# Patient Record
Sex: FEMALE | Race: WHITE | NOT HISPANIC OR LATINO | Employment: OTHER | URBAN - METROPOLITAN AREA
[De-identification: names, ages, dates, MRNs, and addresses within clinical notes are randomized per-mention and may not be internally consistent; named-entity substitution may affect disease eponyms.]

---

## 2017-10-22 ENCOUNTER — APPOINTMENT (EMERGENCY)
Dept: RADIOLOGY | Facility: HOSPITAL | Age: 82
DRG: 292 | End: 2017-10-22
Payer: COMMERCIAL

## 2017-10-22 ENCOUNTER — APPOINTMENT (EMERGENCY)
Dept: CT IMAGING | Facility: HOSPITAL | Age: 82
DRG: 292 | End: 2017-10-22
Payer: COMMERCIAL

## 2017-10-22 ENCOUNTER — HOSPITAL ENCOUNTER (INPATIENT)
Facility: HOSPITAL | Age: 82
LOS: 9 days | Discharge: NURSING, REHAB OR LONG-TERM CARE | DRG: 292 | End: 2017-11-01
Attending: EMERGENCY MEDICINE | Admitting: INTERNAL MEDICINE
Payer: COMMERCIAL

## 2017-10-22 DIAGNOSIS — S80.02XD TRAUMATIC HEMATOMA OF KNEE, LEFT, SUBSEQUENT ENCOUNTER: ICD-10-CM

## 2017-10-22 DIAGNOSIS — J98.11 ATELECTASIS: ICD-10-CM

## 2017-10-22 DIAGNOSIS — R06.00 DYSPNEA: Primary | ICD-10-CM

## 2017-10-22 DIAGNOSIS — S80.00XA KNEE CONTUSION: ICD-10-CM

## 2017-10-22 DIAGNOSIS — I50.9 CHF (CONGESTIVE HEART FAILURE) (HCC): ICD-10-CM

## 2017-10-22 LAB
ALBUMIN SERPL BCP-MCNC: 3.6 G/DL (ref 3.5–5)
ALP SERPL-CCNC: 77 U/L (ref 46–116)
ALT SERPL W P-5'-P-CCNC: 25 U/L (ref 12–78)
ANION GAP SERPL CALCULATED.3IONS-SCNC: 7 MMOL/L (ref 4–13)
APTT PPP: 26 SECONDS (ref 23–35)
AST SERPL W P-5'-P-CCNC: 20 U/L (ref 5–45)
BASOPHILS # BLD AUTO: 0.06 THOUSANDS/ΜL (ref 0–0.1)
BASOPHILS NFR BLD AUTO: 1 % (ref 0–1)
BILIRUB SERPL-MCNC: 0.59 MG/DL (ref 0.2–1)
BUN SERPL-MCNC: 16 MG/DL (ref 5–25)
CALCIUM SERPL-MCNC: 9.2 MG/DL (ref 8.3–10.1)
CHLORIDE SERPL-SCNC: 103 MMOL/L (ref 100–108)
CO2 SERPL-SCNC: 34 MMOL/L (ref 21–32)
CREAT SERPL-MCNC: 0.85 MG/DL (ref 0.6–1.3)
EOSINOPHIL # BLD AUTO: 0.32 THOUSAND/ΜL (ref 0–0.61)
EOSINOPHIL NFR BLD AUTO: 5 % (ref 0–6)
ERYTHROCYTE [DISTWIDTH] IN BLOOD BY AUTOMATED COUNT: 14.2 % (ref 11.6–15.1)
GFR SERPL CREATININE-BSD FRML MDRD: 61 ML/MIN/1.73SQ M
GLUCOSE SERPL-MCNC: 103 MG/DL (ref 65–140)
HCT VFR BLD AUTO: 38.7 % (ref 34.8–46.1)
HGB BLD-MCNC: 12.7 G/DL (ref 11.5–15.4)
INR PPP: 1.08 (ref 0.86–1.16)
LYMPHOCYTES # BLD AUTO: 1.64 THOUSANDS/ΜL (ref 0.6–4.47)
LYMPHOCYTES NFR BLD AUTO: 24 % (ref 14–44)
MCH RBC QN AUTO: 30.5 PG (ref 26.8–34.3)
MCHC RBC AUTO-ENTMCNC: 32.8 G/DL (ref 31.4–37.4)
MCV RBC AUTO: 93 FL (ref 82–98)
MONOCYTES # BLD AUTO: 0.68 THOUSAND/ΜL (ref 0.17–1.22)
MONOCYTES NFR BLD AUTO: 10 % (ref 4–12)
NEUTROPHILS # BLD AUTO: 4.04 THOUSANDS/ΜL (ref 1.85–7.62)
NEUTS SEG NFR BLD AUTO: 60 % (ref 43–75)
NRBC BLD AUTO-RTO: 0 /100 WBCS
NT-PROBNP SERPL-MCNC: 1296 PG/ML
PLATELET # BLD AUTO: 231 THOUSANDS/UL (ref 149–390)
PMV BLD AUTO: 10.5 FL (ref 8.9–12.7)
POTASSIUM SERPL-SCNC: 3.8 MMOL/L (ref 3.5–5.3)
PROT SERPL-MCNC: 7 G/DL (ref 6.4–8.2)
PROTHROMBIN TIME: 14 SECONDS (ref 12.1–14.4)
RBC # BLD AUTO: 4.16 MILLION/UL (ref 3.81–5.12)
SODIUM SERPL-SCNC: 144 MMOL/L (ref 136–145)
SPECIMEN SOURCE: NORMAL
TROPONIN I BLD-MCNC: 0.01 NG/ML (ref 0–0.08)
WBC # BLD AUTO: 6.74 THOUSAND/UL (ref 4.31–10.16)

## 2017-10-22 PROCEDURE — 85730 THROMBOPLASTIN TIME PARTIAL: CPT | Performed by: EMERGENCY MEDICINE

## 2017-10-22 PROCEDURE — 83880 ASSAY OF NATRIURETIC PEPTIDE: CPT | Performed by: EMERGENCY MEDICINE

## 2017-10-22 PROCEDURE — 93005 ELECTROCARDIOGRAM TRACING: CPT | Performed by: EMERGENCY MEDICINE

## 2017-10-22 PROCEDURE — 71275 CT ANGIOGRAPHY CHEST: CPT

## 2017-10-22 PROCEDURE — 84484 ASSAY OF TROPONIN QUANT: CPT

## 2017-10-22 PROCEDURE — 36415 COLL VENOUS BLD VENIPUNCTURE: CPT | Performed by: EMERGENCY MEDICINE

## 2017-10-22 PROCEDURE — 85025 COMPLETE CBC W/AUTO DIFF WBC: CPT | Performed by: EMERGENCY MEDICINE

## 2017-10-22 PROCEDURE — 85610 PROTHROMBIN TIME: CPT | Performed by: EMERGENCY MEDICINE

## 2017-10-22 PROCEDURE — 80053 COMPREHEN METABOLIC PANEL: CPT | Performed by: EMERGENCY MEDICINE

## 2017-10-22 PROCEDURE — 71020 HB CHEST X-RAY 2VW FRONTAL&LATL: CPT

## 2017-10-22 RX ORDER — FUROSEMIDE 40 MG/1
40 TABLET ORAL 2 TIMES DAILY
COMMUNITY
End: 2018-01-07

## 2017-10-22 RX ORDER — ASPIRIN 81 MG/1
81 TABLET ORAL DAILY
COMMUNITY
End: 2018-01-07

## 2017-10-22 RX ORDER — AMOXICILLIN 250 MG
1 CAPSULE ORAL DAILY
COMMUNITY

## 2017-10-22 RX ORDER — PHENOL 1.4 %
600 AEROSOL, SPRAY (ML) MUCOUS MEMBRANE 2 TIMES DAILY WITH MEALS
Status: ON HOLD | COMMUNITY
End: 2020-11-07 | Stop reason: CLARIF

## 2017-10-22 RX ORDER — CHLORAL HYDRATE 500 MG
2000 CAPSULE ORAL DAILY
Status: ON HOLD | COMMUNITY
End: 2020-11-07 | Stop reason: CLARIF

## 2017-10-22 RX ORDER — ALENDRONATE SODIUM 70 MG/1
70 TABLET ORAL
Status: ON HOLD | COMMUNITY
End: 2020-11-07 | Stop reason: CLARIF

## 2017-10-22 RX ORDER — ATORVASTATIN CALCIUM 20 MG/1
20 TABLET, FILM COATED ORAL DAILY
Status: ON HOLD | COMMUNITY
End: 2020-11-07 | Stop reason: CLARIF

## 2017-10-22 RX ORDER — MELATONIN
5000 DAILY
Status: ON HOLD | COMMUNITY
End: 2020-11-07 | Stop reason: CLARIF

## 2017-10-23 ENCOUNTER — APPOINTMENT (OUTPATIENT)
Dept: RADIOLOGY | Facility: HOSPITAL | Age: 82
DRG: 292 | End: 2017-10-23
Payer: COMMERCIAL

## 2017-10-23 PROBLEM — E78.5 HYPERLIPIDEMIA: Status: ACTIVE | Noted: 2017-10-23

## 2017-10-23 PROBLEM — I50.9 CHF (CONGESTIVE HEART FAILURE) (HCC): Status: ACTIVE | Noted: 2017-10-23

## 2017-10-23 PROBLEM — S80.00XA KNEE CONTUSION: Status: ACTIVE | Noted: 2017-10-23

## 2017-10-23 PROBLEM — I48.91 A-FIB (HCC): Status: ACTIVE | Noted: 2017-10-23

## 2017-10-23 PROBLEM — I25.10 CORONARY ARTERY DISEASE: Status: ACTIVE | Noted: 2017-10-23

## 2017-10-23 PROBLEM — E07.9 DISEASE OF THYROID GLAND: Status: ACTIVE | Noted: 2017-10-23

## 2017-10-23 LAB
ATRIAL RATE: 187 BPM
BACTERIA UR QL AUTO: ABNORMAL /HPF
BILIRUB UR QL STRIP: NEGATIVE
CLARITY UR: CLEAR
COLOR UR: YELLOW
ERYTHROCYTE [DISTWIDTH] IN BLOOD BY AUTOMATED COUNT: 14.1 % (ref 11.6–15.1)
GLUCOSE UR STRIP-MCNC: NEGATIVE MG/DL
HCT VFR BLD AUTO: 37.3 % (ref 34.8–46.1)
HGB BLD-MCNC: 12.1 G/DL (ref 11.5–15.4)
HGB UR QL STRIP.AUTO: NEGATIVE
KETONES UR STRIP-MCNC: NEGATIVE MG/DL
LEUKOCYTE ESTERASE UR QL STRIP: NEGATIVE
MCH RBC QN AUTO: 30.1 PG (ref 26.8–34.3)
MCHC RBC AUTO-ENTMCNC: 32.4 G/DL (ref 31.4–37.4)
MCV RBC AUTO: 93 FL (ref 82–98)
NITRITE UR QL STRIP: POSITIVE
NON-SQ EPI CELLS URNS QL MICRO: ABNORMAL /HPF
P AXIS: -8 DEGREES
PH UR STRIP.AUTO: 5 [PH] (ref 4.5–8)
PLATELET # BLD AUTO: 221 THOUSANDS/UL (ref 149–390)
PMV BLD AUTO: 10.9 FL (ref 8.9–12.7)
PR INTERVAL: 264 MS
PROT UR STRIP-MCNC: NEGATIVE MG/DL
QRS AXIS: 127 DEGREES
QRSD INTERVAL: 136 MS
QT INTERVAL: 462 MS
QTC INTERVAL: 468 MS
RBC # BLD AUTO: 4.02 MILLION/UL (ref 3.81–5.12)
RBC #/AREA URNS AUTO: ABNORMAL /HPF
SP GR UR STRIP.AUTO: 1.01 (ref 1–1.03)
T WAVE AXIS: -22 DEGREES
TROPONIN I SERPL-MCNC: <0.02 NG/ML
UROBILINOGEN UR QL STRIP.AUTO: 0.2 E.U./DL
VENTRICULAR RATE: 62 BPM
WBC # BLD AUTO: 6.43 THOUSAND/UL (ref 4.31–10.16)
WBC #/AREA URNS AUTO: ABNORMAL /HPF

## 2017-10-23 PROCEDURE — 99285 EMERGENCY DEPT VISIT HI MDM: CPT

## 2017-10-23 PROCEDURE — 97163 PT EVAL HIGH COMPLEX 45 MIN: CPT

## 2017-10-23 PROCEDURE — G8987 SELF CARE CURRENT STATUS: HCPCS

## 2017-10-23 PROCEDURE — 81001 URINALYSIS AUTO W/SCOPE: CPT | Performed by: PHYSICIAN ASSISTANT

## 2017-10-23 PROCEDURE — G8979 MOBILITY GOAL STATUS: HCPCS

## 2017-10-23 PROCEDURE — 85027 COMPLETE CBC AUTOMATED: CPT | Performed by: PHYSICIAN ASSISTANT

## 2017-10-23 PROCEDURE — 84484 ASSAY OF TROPONIN QUANT: CPT | Performed by: PHYSICIAN ASSISTANT

## 2017-10-23 PROCEDURE — 73560 X-RAY EXAM OF KNEE 1 OR 2: CPT

## 2017-10-23 PROCEDURE — 97166 OT EVAL MOD COMPLEX 45 MIN: CPT

## 2017-10-23 PROCEDURE — G8988 SELF CARE GOAL STATUS: HCPCS

## 2017-10-23 PROCEDURE — G8978 MOBILITY CURRENT STATUS: HCPCS

## 2017-10-23 RX ORDER — FUROSEMIDE 10 MG/ML
40 INJECTION INTRAMUSCULAR; INTRAVENOUS
Status: DISCONTINUED | OUTPATIENT
Start: 2017-10-23 | End: 2017-10-24

## 2017-10-23 RX ORDER — MELATONIN
5000 DAILY
Status: DISCONTINUED | OUTPATIENT
Start: 2017-10-23 | End: 2017-11-01 | Stop reason: HOSPADM

## 2017-10-23 RX ORDER — MAGNESIUM HYDROXIDE/ALUMINUM HYDROXICE/SIMETHICONE 120; 1200; 1200 MG/30ML; MG/30ML; MG/30ML
30 SUSPENSION ORAL EVERY 6 HOURS PRN
Status: DISCONTINUED | OUTPATIENT
Start: 2017-10-23 | End: 2017-11-01 | Stop reason: HOSPADM

## 2017-10-23 RX ORDER — ONDANSETRON 2 MG/ML
4 INJECTION INTRAMUSCULAR; INTRAVENOUS EVERY 6 HOURS PRN
Status: DISCONTINUED | OUTPATIENT
Start: 2017-10-23 | End: 2017-11-01 | Stop reason: HOSPADM

## 2017-10-23 RX ORDER — POLYETHYLENE GLYCOL 3350 17 G/17G
17 POWDER, FOR SOLUTION ORAL DAILY
Status: DISCONTINUED | OUTPATIENT
Start: 2017-10-23 | End: 2017-10-27

## 2017-10-23 RX ORDER — ASPIRIN 81 MG/1
81 TABLET ORAL DAILY
Status: DISCONTINUED | OUTPATIENT
Start: 2017-10-23 | End: 2017-10-23

## 2017-10-23 RX ORDER — AMOXICILLIN 250 MG
1 CAPSULE ORAL DAILY
Status: DISCONTINUED | OUTPATIENT
Start: 2017-10-23 | End: 2017-11-01 | Stop reason: HOSPADM

## 2017-10-23 RX ORDER — CHLORAL HYDRATE 500 MG
2000 CAPSULE ORAL DAILY
Status: DISCONTINUED | OUTPATIENT
Start: 2017-10-23 | End: 2017-10-23

## 2017-10-23 RX ORDER — POTASSIUM CHLORIDE 750 MG/1
10 TABLET, EXTENDED RELEASE ORAL 2 TIMES DAILY
Status: DISCONTINUED | OUTPATIENT
Start: 2017-10-23 | End: 2017-10-24

## 2017-10-23 RX ORDER — ATORVASTATIN CALCIUM 20 MG/1
20 TABLET, FILM COATED ORAL DAILY
Status: DISCONTINUED | OUTPATIENT
Start: 2017-10-23 | End: 2017-11-01 | Stop reason: HOSPADM

## 2017-10-23 RX ADMIN — POLYETHYLENE GLYCOL 3350 17 G: 17 POWDER, FOR SOLUTION ORAL at 12:50

## 2017-10-23 RX ADMIN — POTASSIUM CHLORIDE 10 MEQ: 750 TABLET, EXTENDED RELEASE ORAL at 11:35

## 2017-10-23 RX ADMIN — ASPIRIN 81 MG: 81 TABLET, COATED ORAL at 09:51

## 2017-10-23 RX ADMIN — CALCIUM CARBONATE 625 MG: 1250 SUSPENSION ORAL at 17:53

## 2017-10-23 RX ADMIN — CALCIUM CARBONATE 625 MG: 1250 SUSPENSION ORAL at 09:58

## 2017-10-23 RX ADMIN — ATORVASTATIN CALCIUM 20 MG: 20 TABLET, FILM COATED ORAL at 09:51

## 2017-10-23 RX ADMIN — Medication 1 TABLET: at 09:51

## 2017-10-23 RX ADMIN — FUROSEMIDE 40 MG: 10 INJECTION, SOLUTION INTRAMUSCULAR; INTRAVENOUS at 09:48

## 2017-10-23 RX ADMIN — POTASSIUM CHLORIDE 10 MEQ: 750 TABLET, EXTENDED RELEASE ORAL at 17:53

## 2017-10-23 RX ADMIN — FUROSEMIDE 40 MG: 10 INJECTION, SOLUTION INTRAMUSCULAR; INTRAVENOUS at 17:52

## 2017-10-23 RX ADMIN — APIXABAN 5 MG: 5 TABLET, FILM COATED ORAL at 09:51

## 2017-10-23 RX ADMIN — Medication 2000 MG: at 09:51

## 2017-10-23 RX ADMIN — APIXABAN 5 MG: 5 TABLET, FILM COATED ORAL at 17:53

## 2017-10-23 RX ADMIN — IOHEXOL 85 ML: 350 INJECTION, SOLUTION INTRAVENOUS at 00:28

## 2017-10-23 RX ADMIN — VITAMIN D, TAB 1000IU (100/BT) 5000 UNITS: 25 TAB at 09:48

## 2017-10-23 NOTE — ED PROVIDER NOTES
History  Chief Complaint   Patient presents with    Shortness of Breath     pt c/o increased SOB and anxiety when laying flat  Hx of CHF  Swelling noted in bilateral legs  C/o LE edema for 1 & 1/2 weeks  Tonight she has been sob,   No cp, +mild cough, no fevers  No copd or asthma            Prior to Admission Medications   Prescriptions Last Dose Informant Patient Reported? Taking? Mirabegron ER (MYRBETRIQ) 50 MG TB24   Yes Yes   Sig: Take 50 mg by mouth daily   Omega-3 Fatty Acids (FISH OIL) 1,000 mg   Yes Yes   Sig: Take 2,000 mg by mouth daily   Polyethylene Glycol 3350 (PEG 3350 PO)   Yes Yes   Sig: Take 17 g by mouth daily   alendronate (FOSAMAX) 70 mg tablet   Yes Yes   Sig: Take 70 mg by mouth every 7 days   apixaban (ELIQUIS) 5 mg   Yes Yes   Sig: Take 5 mg by mouth 2 (two) times a day   aspirin (ECOTRIN LOW STRENGTH) 81 mg EC tablet   Yes Yes   Sig: Take 81 mg by mouth daily   atorvastatin (LIPITOR) 20 mg tablet   Yes Yes   Sig: Take 20 mg by mouth daily   calcium carbonate (OS-DAVE) 600 MG tablet   Yes Yes   Sig: Take 600 mg by mouth 2 (two) times a day with meals   cholecalciferol (VITAMIN D3) 1,000 units tablet   Yes Yes   Sig: Take 5,000 Units by mouth daily   furosemide (LASIX) 40 mg tablet   Yes Yes   Sig: Take 40 mg by mouth 2 (two) times a day   senna-docusate sodium (SENOKOT S) 8 6-50 mg per tablet   Yes Yes   Sig: Take 1 tablet by mouth daily      Facility-Administered Medications: None       Past Medical History:   Diagnosis Date    CHF (congestive heart failure) (HCC)     Coronary artery disease     Disease of thyroid gland     Hyperlipidemia        History reviewed  No pertinent surgical history  History reviewed  No pertinent family history  I have reviewed and agree with the history as documented      Social History   Substance Use Topics    Smoking status: Never Smoker    Smokeless tobacco: Not on file    Alcohol use No        Review of Systems   Constitutional: Negative for appetite change, fatigue and fever  HENT: Negative for rhinorrhea and sore throat  Respiratory: Positive for shortness of breath  Negative for cough and wheezing  Cardiovascular: Negative for chest pain and leg swelling  Gastrointestinal: Negative for abdominal pain, diarrhea and vomiting  Genitourinary: Negative for dysuria and flank pain  Musculoskeletal: Negative for back pain and neck pain  Skin: Negative for rash  Neurological: Negative for syncope and headaches  Psychiatric/Behavioral:        Mood normal       Physical Exam  ED Triage Vitals   Temperature Pulse Respirations Blood Pressure SpO2   10/22/17 2321 10/22/17 2208 10/22/17 2208 10/22/17 2208 10/22/17 2208   98 7 °F (37 1 °C) 68 20 142/68 98 %      Temp Source Heart Rate Source Patient Position - Orthostatic VS BP Location FiO2 (%)   10/22/17 2321 10/22/17 2208 10/22/17 2208 10/22/17 2208 --   Oral Monitor Lying Right arm       Pain Score       --                  Physical Exam   Constitutional: She is oriented to person, place, and time  She appears well-developed and well-nourished  HENT:   Head: Normocephalic and atraumatic  Neck: Normal range of motion  Neck supple  Cardiovascular: Normal rate and regular rhythm  Pulmonary/Chest: Effort normal  No respiratory distress  She has no wheezes  No airway compromise   Abdominal: Soft  There is no tenderness  Musculoskeletal: Normal range of motion  Neurological: She is alert and oriented to person, place, and time  Skin: Skin is warm and dry  Nursing note and vitals reviewed        ED Medications  Medications   iohexol (OMNIPAQUE) 350 MG/ML injection (MULTI-DOSE) 85 mL (85 mL Intravenous Given 10/23/17 0028)       Diagnostic Studies  Labs Reviewed   COMPREHENSIVE METABOLIC PANEL - Abnormal        Result Value Ref Range Status    CO2 34 (*) 21 - 32 mmol/L Final    Sodium 144  136 - 145 mmol/L Final    Potassium 3 8  3 5 - 5 3 mmol/L Final    Chloride 103  100 - 108 mmol/L Final    Anion Gap 7  4 - 13 mmol/L Final    BUN 16  5 - 25 mg/dL Final    Creatinine 0 85  0 60 - 1 30 mg/dL Final    Comment: Standardized to IDMS reference method    Glucose 103  65 - 140 mg/dL Final    Comment:   If the patient is fasting, the ADA then defines impaired fasting glucose as > 100 mg/dL and diabetes as > or equal to 123 mg/dL  Specimen collection should occur prior to Sulfasalazine administration due to the potential for falsely depressed results  Specimen collection should occur prior to Sulfapyridine administration due to the potential for falsely elevated results  Calcium 9 2  8 3 - 10 1 mg/dL Final    AST 20  5 - 45 U/L Final    Comment:   Specimen collection should occur prior to Sulfasalazine administration due to the potential for falsely depressed results  ALT 25  12 - 78 U/L Final    Comment:   Specimen collection should occur prior to Sulfasalazine administration due to the potential for falsely depressed results  Alkaline Phosphatase 77  46 - 116 U/L Final    Total Protein 7 0  6 4 - 8 2 g/dL Final    Albumin 3 6  3 5 - 5 0 g/dL Final    Total Bilirubin 0 59  0 20 - 1 00 mg/dL Final    eGFR 61  ml/min/1 73sq m Final    Narrative:     National Kidney Disease Education Program recommendations are as follows:  GFR calculation is accurate only with a steady state creatinine  Chronic Kidney disease less than 60 ml/min/1 73 sq  meters  Kidney failure less than 15 ml/min/1 73 sq  meters     NT-BNP PRO (BRAIN NATRIURETIC PEPTIDE) - Abnormal     NT-proBNP 1,296 (*) <450 pg/mL Final   CBC AND DIFFERENTIAL - Normal    WBC 6 74  4 31 - 10 16 Thousand/uL Final    RBC 4 16  3 81 - 5 12 Million/uL Final    Hemoglobin 12 7  11 5 - 15 4 g/dL Final    Hematocrit 38 7  34 8 - 46 1 % Final    MCV 93  82 - 98 fL Final    MCH 30 5  26 8 - 34 3 pg Final    MCHC 32 8  31 4 - 37 4 g/dL Final    RDW 14 2  11 6 - 15 1 % Final    MPV 10 5  8 9 - 12 7 fL Final    Platelets 810  087 - 390 Thousands/uL Final    nRBC 0  /100 WBCs Final    Neutrophils Relative 60  43 - 75 % Final    Lymphocytes Relative 24  14 - 44 % Final    Monocytes Relative 10  4 - 12 % Final    Eosinophils Relative 5  0 - 6 % Final    Basophils Relative 1  0 - 1 % Final    Neutrophils Absolute 4 04  1 85 - 7 62 Thousands/µL Final    Lymphocytes Absolute 1 64  0 60 - 4 47 Thousands/µL Final    Monocytes Absolute 0 68  0 17 - 1 22 Thousand/µL Final    Eosinophils Absolute 0 32  0 00 - 0 61 Thousand/µL Final    Basophils Absolute 0 06  0 00 - 0 10 Thousands/µL Final   PROTIME-INR - Normal    Protime 14 0  12 1 - 14 4 seconds Final    INR 1 08  0 86 - 1 16 Final   APTT - Normal    PTT 26  23 - 35 seconds Final    Narrative: Therapeutic Heparin Range = 60-90 seconds   POCT TROPONIN - Normal    POC Troponin I 0 01  0 00 - 0 08 ng/ml Final    Specimen Type VENOUS   Final    Narrative:     [employer] i-Stat handheld analyzer 99% cutoff is > 0 08ng/mL in [facility] Emergency Departments    o cTnI 99% cutoff is useful only when applied to patients in the clinical setting of myocardial ischemia  o cTnI 99% cutoff should be interpreted in the context of clinical history, ECG findings and possibly cardiac imaging to establish correct diagnosis  o cTnI 99% cutoff may be suggestive but clearly not indicative of a coronary event without the clinical setting of myocardial ischemia  X-ray chest 2 views    (Results Pending)   CTA ED chest PE study    (Results Pending)       Procedures  Procedures      Phone Contacts  ED Phone Contact    ED Course  ED Course                                MDM  Number of Diagnoses or Management Options  Atelectasis:   Dyspnea:      Amount and/or Complexity of Data Reviewed  Clinical lab tests: ordered and reviewed  Tests in the radiology section of CPT®: ordered and reviewed    Risk of Complications, Morbidity, and/or Mortality  Presenting problems: moderate  General comments: Pt   Admitted to [facility] for further work up      CritCare Time    Disposition  Final diagnoses:   Dyspnea   Atelectasis     ED Disposition     ED Disposition Condition Comment    Admit  Case was discussed with GRAYSON and the patient's admission status was agreed to be observation/tele      Follow-up Information    None       Patient's Medications   Discharge Prescriptions    No medications on file     No discharge procedures on file      ED Provider  Electronically Signed by       Elijah Desai MD  10/23/17 7704

## 2017-10-23 NOTE — H&P
History and Physical - [facility] Internal Medicine    Patient Information: Jacques An 80 y o  female MRN: [id number]  Unit/Bed#: WALTER Encounter: [id number]  Admitting Physician: Des Kang PA-C  PCP: Ke Garcia  Date of Admission:  10/23/17    Assessment/Plan:    Hospital Problem List:     Principal Problem:    CHF (congestive heart failure) ([facility] [address] )  Active Problems:    Hyperlipidemia    Coronary artery disease    A-fib ([facility] [address] )    Knee contusion      Plan for the Primary Problem(s):  · Acute on chronic CHF  · Admit to med/surg on telemetry  Recent admit to [facility] earlier this month for same  Had ECHO on 10/10 that showed EF of 60%  Will not order repeat ECHO at this time  Patient presents to [facility] for admission this time at family request  Order IV lasix  Trend troponin  Consult cardiology  Plan for Additional Problems:   · Hyperlipidemia- continue lipitor  · CAD- continue telemetry and trend troponin  Denies chest pain at this time  · afib- on eliquis  · Left knee contusion with hx total knee replacement- will order xray and ortho consult due to severity of bruising  VTE Prophylaxis: Apixaban (Eliquis)  / reason for no mechanical VTE prophylaxis swelling   Code Status: DNR/DNI  POLST: There is no POLST form on file for this patient (pre-hospital)    Anticipated Length of Stay:  Patient will be admitted on an Observation basis with an anticipated length of stay of  Less than 2 midnights  Justification for Hospital Stay: patient requires IV lasix and troponin    Total Time for Visit, including Counseling / Coordination of Care: 45 minutes  Greater than 50% of this total time spent on direct patient counseling and coordination of care  Chief Complaint:   Shortness of breath    History of Present Illness:    Jacques An is a 80 y o  female who presents with shortness of breath  Patient has memory loss and is a poor historian  She resides at [facility]  Her son is at bedside   She was recently admitted to [facility] for acute on chronic CHF  She had an ECHO on 10/10 that showed EF of 60%  She was [facility] back to [facility]F on lasix 40mg daily  A recent PCP office note shows that her lasix dose was increased to 60mg BID due to continued shortness of breath and leg edema  She denies chest pain  Of note she also fell a week and a half ago  She has a history of knee replacements but is unsure who did them  She has significant swelling of the left knee and left posterior thigh  It appears that an xray was done after the fall but she was not seen by ortho  She presents to this hospital for admission at patient's family request      Review of Systems:    Review of Systems   Constitutional: Negative  HENT: Negative  Eyes: Negative  Respiratory: Positive for cough and shortness of breath  Cardiovascular: Positive for leg swelling  Gastrointestinal: Negative  Endocrine: Negative  Genitourinary: Negative  Musculoskeletal: Positive for gait problem and joint swelling  Skin: Positive for color change  Allergic/Immunologic: Negative  Hematological: Bruises/bleeds easily  Psychiatric/Behavioral: Negative  Past Medical and Surgical History:     Past Medical History:   Diagnosis Date    CHF (congestive heart failure) ([facility] [address] )     Coronary artery disease     Disease of thyroid gland     Hyperlipidemia        History reviewed  No pertinent surgical history  Meds/Allergies:    Prior to Admission medications    Medication Sig Start Date End Date Taking?  Authorizing Provider   alendronate (FOSAMAX) 70 mg tablet Take 70 mg by mouth every 7 days   Yes Historical Provider, MD   apixaban (ELIQUIS) 5 mg Take 5 mg by mouth 2 (two) times a day   Yes Historical Provider, MD   aspirin (ECOTRIN LOW STRENGTH) 81 mg EC tablet Take 81 mg by mouth daily   Yes Historical Provider, MD   atorvastatin (LIPITOR) 20 mg tablet Take 20 mg by mouth daily   Yes Historical Provider, MD   calcium carbonate (OS-DAVE) 600 MG tablet Take 600 mg by mouth 2 (two) times a day with meals   Yes Historical Provider, MD   cholecalciferol (VITAMIN D3) 1,000 units tablet Take 5,000 Units by mouth daily   Yes Historical Provider, MD   furosemide (LASIX) 40 mg tablet Take 40 mg by mouth 2 (two) times a day   Yes Historical Provider, MD   Mirabegron ER (MYRBETRIQ) 50 MG TB24 Take 50 mg by mouth daily   Yes Historical Provider, MD   Omega-3 Fatty Acids (FISH OIL) 1,000 mg Take 2,000 mg by mouth daily   Yes Historical Provider, MD   Polyethylene Glycol 3350 (PEG 3350 PO) Take 17 g by mouth daily   Yes Historical Provider, MD   senna-docusate sodium (SENOKOT S) 8 6-50 mg per tablet Take 1 tablet by mouth daily   Yes Historical Provider, MD     I have reveiwed home medications using records provided by [facility]  Allergies: No Known Allergies    Social History:     Marital Status:    Occupation: retired  Patient Pre-hospital Living Situation: lives at [facility]  Patient Pre-hospital Level of Mobility: recent falls  Uses walker  Patient Pre-hospital Diet Restrictions: none  Substance Use History:   History   Alcohol Use No     History   Smoking Status    Never Smoker   Smokeless Tobacco    Not on file     History   Drug Use No       Family History:    non-contributory    Physical Exam:     Vitals:   Blood Pressure: 149/65 (10/23/17 0144)  Pulse: 70 (10/23/17 0144)  Temperature: 98 7 °F (37 1 °C) (10/22/17 2321)  Temp Source: Oral (10/22/17 2321)  Respirations: 21 (10/23/17 0144)  Weight - Scale: 98 kg (216 lb 0 8 oz) (10/22/17 2208)  SpO2: 98 % (10/23/17 0144)    Physical Exam   Constitutional: She appears well-developed and well-nourished  No distress  HENT:   Head: Normocephalic and atraumatic  Eyes: Conjunctivae are normal  Pupils are equal, round, and reactive to light  Neck: Normal range of motion  Neck supple  No JVD present  No tracheal deviation present  No thyromegaly present     Cardiovascular: Normal rate and regular rhythm  Pulmonary/Chest: Effort normal    Decreased breath sounds in bases bilaterally  No wheezes   Abdominal: Soft  Bowel sounds are normal  She exhibits no distension and no mass  There is no tenderness  There is no rebound and no guarding  Musculoskeletal:   Left knee with significant ecchymosis anteriorly and extending posteriorly into thigh and posterior aspect of knee  Bilateral lower extremity pitting edema noted   Lymphadenopathy:     She has no cervical adenopathy  Neurological: She is alert  Pleasantly confused   Skin: Skin is warm and dry  She is not diaphoretic  Ecchymosis noted on left knee and thigh and on bilateral arms   Psychiatric: She has a normal mood and affect  Her behavior is normal    Vitals reviewed  Additional Data:     Lab Results: I have personally reviewed pertinent reports  Results from last 7 days  Lab Units 10/22/17  2212   WBC Thousand/uL 6 74   HEMOGLOBIN g/dL 12 7   HEMATOCRIT % 38 7   PLATELETS Thousands/uL 231   NEUTROS PCT % 60   LYMPHS PCT % 24   MONOS PCT % 10   EOS PCT % 5       Results from last 7 days  Lab Units 10/22/17  2212   SODIUM mmol/L 144   POTASSIUM mmol/L 3 8   CHLORIDE mmol/L 103   CO2 mmol/L 34*   BUN mg/dL 16   CREATININE mg/dL 0 85   CALCIUM mg/dL 9 2   TOTAL PROTEIN g/dL 7 0   BILIRUBIN TOTAL mg/dL 0 59   ALK PHOS U/L 77   ALT U/L 25   AST U/L 20   GLUCOSE RANDOM mg/dL 103       Results from last 7 days  Lab Units 10/22/17  2212   INR  1 08       Imaging: I have personally reviewed pertinent reports  No results found  EKG, Pathology, and Other Studies Reviewed on Admission:   · EKG: no ischemic changes    Allscripts / Epic Records Reviewed: Yes     ** Please Note: This note has been constructed using a voice recognition system   **

## 2017-10-23 NOTE — CONSULTS
Consult - Cardiology   Mckenna Goodman Charlton 80 y o  female MRN: [id number]  Unit/Bed#: E2 -01 Encounter: [id number]          Reason For Consult:  CHF, AFIB, Pacer in situ    History Of Present Illness: The patient is an 72-year-old white female with a history of dementia  She resides at [facility]  She has known atrial fibrillation with a permanent pacemaker in situ  She has known diastolic congestive heart failure, hypertension with LVH and hyperlipidemia  [facility]  She was recently hospitalized at [facility] from 10/09/17-10/11/17 for heart failure  Her family physician saw her 4 days ago and adjusted her diuretics upwards since the family had noted that her swelling was worse in the week following discharge  He increased her furosemide to 60 mg b i d     The patient also had increasing dyspnea  The patient was admitted early today with ongoing dyspnea and edema  Apparently she also had fallen  She is a very poor historian  She does appear to be dyspnea  She denies maegan chest pain, palpitations or lightheadedness  Past Medical History:   Chronic diastolic congestive heart failure  Sick sinus syndrome with both atrial fibrillation and pacemaker in situ  Hypertension with mild to moderate left ventricular hypertrophy on recent echocardiogram  Hyperlipidemia-on statin  History of UTI  Dementia  Vitamin-D deficiency  Osteoporosis  Hard of hearing  Apparent nonobstructive CAD on cardiac catheterization in 2010  History of bilateral knee replacement  Nonocclusive carotid stenosis  Remote type of cancer-unspecified  Mild aortic stenosis on recent echo    Allergy:  No Known Allergies    Medications:  Five as a max 70 mg weekly  Eliquis 5 mg b i d  Aspirin 81 mg daily  Atorvastatin 20 mg daily  Calcium carbonate 600 mg b i d    Vitamin D3 5000 units daily  Fish oil 2 g daily  Myrbetriq  MiraLax  Senokot  Furosemide 60 mg b i d -just increased last Thursday  Sodium phosphates p r n  constipation    Family History:  Arthritis, cancer    Social History:  Apparent alcohol excess in the past  Never smoked        ROS:  No TIAs or claudication    Exam:  Blood pressure 133/64 pulse is 62 and somewhat irregular  General:  Obese pleasant elderly white female with perhaps mild dyspnea at rest,   Head: Normocephalic, atraumatic  Eyes:   No Icterus  Normal Conjunctiva  Oropharynx: normal-appearing mucosa and no pharyngitis, no exudate  Neck: thyroid: not enlarged, symmetric, no tenderness/mass/nodules, no carotid bruit and no JVD   Heart: irregularly irregular rhythm,, grade 2 systolic murmur at the base  No diastolic murmur rub or gallop  Lungs:  Decreased breath sounds at the bases bilaterally  No wheezing, rhonchi or Rales  Abdomen: obese, normal findings: no masses palpable, no organomegaly and soft, non-tender  Lower Limbs: 2-3+ edema with absent ft pulses    ECG: AFIB  Left sided IVCD  Nonspecific ST T wave abnls  Pacer operating in VVI mode  Troponins negative  Potassium 3 8 BUN 16, creatinine 0 85  Hemoglobin 12 7, white blood count 6 74, platelets [id number]  ProBNP 1296  Chest x-ray shows focal opacity in right mid to lower lung field    ASSESSMENT AND PLAN:   1  Acute on chronic diastolic congestive heart failure  Agree with intravenous Lasix although may have to increase this dosage  Follow renal function closely- note the family physician has noted that the patient's Tami Irina is probably closer to 199 lb (in the office in the past) and she is about 15 lbs fluid overload  2  Sick sinus syndrome with pacemaker in situ and established atrial fibrillation  Patient is on Eliquis which may not be advisable going forward in light of falls  At this time will stop aspirin since no strong indication for this  Also would stop fish oil-note rate is controlled without AV renan blocking agents  3  Mild aortic stenosis  4    Apparent history of hypertension with LVH although blood pressure well controlled without specific medications  5  Hyperlipidemia-on statin  Will follow with you and advise further        Tatum Novak MD

## 2017-10-23 NOTE — PLAN OF CARE
Seen on morning rounds after early morning admission with recent fall and significant ecchymosis to a in area of total knee arthroplasty with large area of ecchymosis and questionable hematoma over the left patellar region and ecchymosis below and above area of total knee  Areas taut patient insistent on wanting to weight bear patient has underlying dementia  She is dyspneic at rest unclear what her baseline is recent admission at [facility] in relation to recurrent CHF entrance BNP 1200 chest x-ray with vascular congestion bilaterally CTA of chest pending on a patient with chronic atrial fibrillation on Eliquis  Will await orthopedic input along with Cardiology will continue IV diuresis for now  Presently in rate controlled AFib

## 2017-10-23 NOTE — CASE MANAGEMENT
[address] [facility] in the [facility] by Clinton Flores for 2017  Network Utilization Review Department  Phone: [phone]; Fax [phone]  ATTENTION: The Network Utilization Review Department is now centralized for our 7 Facilities  Make a note that we have a new phone and fax numbers for our Department  Please call with any questions or concerns to [phone] and carefully follow the prompts so that you are directed to the right person  All voicemails are confidential  Fax any determinations, approvals, denials, and requests for initial or continue stay review clinical to [phone]  Due to HIGH CALL volume, it would be easier if you could please send faxed requests to expedite your requests and in part, help us provide discharge notifications faster  Initial Clinical Review    Admission: Date/Time/Statement:      IP Order  10/23  1040  Admitting Physician SADE Phillip    Level of Care Med Surg    Estimated length of stay More than 2 Midnights    Certification I certify that inpatient services are medically necessary for this patient for a duration of greater than two midnights  See H&P and MD Progress Notes for additional information about the patient's course of treatment      OBS order  10/23   0119    Orders Placed This Encounter   Procedures    Place in Observation (expected length of stay for this patient is less than two midnights)     Standing Status:   Standing     Number of Occurrences:   1     Order Specific Question:   Admitting Physician     Answer:   Ruth Marquez [[id number]]     Order Specific Question:   Level of Care     Answer:   Med Surg [16]         ED: Date/Time/Mode of Arrival:   ED Arrival Information     Expected Arrival Acuity Means of Arrival Escorted By Service Admission Type    - 10/22/2017 22:00 Urgent Ambulance [facility] General Medicine Urgent    Arrival Complaint    Shortness Of Breath          Chief Complaint: Chief Complaint   Patient presents with    Shortness of Breath     pt c/o increased SOB and anxiety when laying flat  Hx of CHF  Swelling noted in bilateral legs  History of Illness:    Drew Avila is a 80 y o  female who presents with shortness of breath  Patient has memory loss and is a poor historian  She resides at [facility]  Her son is at bedside  She was recently admitted to [facility] for acute on chronic CHF  She had an ECHO on 10/10 that showed EF of 60%  She was sent back to SNF on lasix 40mg daily  A recent PCP office note shows that her lasix dose was increased to 60mg BID due to continued shortness of breath and leg edema  She denies chest pain  Of note she also fell a week and a half ago  She has a history of knee replacements but is unsure who did them  She has significant swelling of the left knee and left posterior thigh  It appears that an xray was done after the fall but she was not seen by ortho   She presents to this hospital for admission at patient's family request         ED Vital Signs:   ED Triage Vitals   Temperature Pulse Respirations Blood Pressure SpO2   10/22/17 2321 10/22/17 2208 10/22/17 2208 10/22/17 2208 10/22/17 2208   98 7 °F (37 1 °C) 68 20 142/68 98 %      Temp Source Heart Rate Source Patient Position - Orthostatic VS BP Location FiO2 (%)   10/22/17 2321 10/22/17 2208 10/22/17 2208 10/22/17 2208 --   Oral Monitor Lying Right arm       Pain Score       10/23/17 0144       No Pain        Wt Readings from Last 1 Encounters:   10/23/17 96 kg (211 lb 9 6 oz)       Vital Signs (abnormal):   10/23/17 0144 -- 70 21 149/65 98 % -- AM   10/23/17 0031 -- 61 22 142/63 99 % -- HMR   10/22/17 2321 98 7 °F (37 1 °C) 66  24 167/70 98 %       Abnormal Labs/Diagnostic Test Results: BNP 1296, Co2 34  CT chest - wnl  EKG- a-fib     ED Treatment:   Medication Administration from 10/22/2017 2200 to 10/23/2017 0202       Date/Time Order Dose Route Action Action by Comments     10/23/2017 0028 iohexol (OMNIPAQUE) 350 MG/ML injection (MULTI-DOSE) 85 mL 85 mL Intravenous Given Maxine Friday           Past Medical/Surgical History: Active Ambulatory Problems     Diagnosis Date Noted    Disease of thyroid gland 10/23/2017     Resolved Ambulatory Problems     Diagnosis Date Noted    No Resolved Ambulatory Problems     Past Medical History:   Diagnosis Date    CHF (congestive heart failure) (HCC)     Coronary artery disease     Disease of thyroid gland     Hyperlipidemia        Admitting Diagnosis: Shortness of breath [R06 02]  Atelectasis [J98 11]  CHF (congestive heart failure) (HCC) [I50 9]  Dyspnea [R06 00]  Knee contusion [S80 00XA]    Age/Sex: 80 y o  female    Assessment/Plan: Hospital Problem List:    Principal Problem:    CHF (congestive heart failure) ([facility] [address] )  Active Problems:    Hyperlipidemia    Coronary artery disease    A-fib (HCC)    Knee contusion   Plan for the Primary Problem(s):  · Acute on chronic CHF  ? Admit to med/surg on telemetry  Recent admit to [facility] earlier this month for same  Had ECHO on 10/10 that showed EF of 60%  Will not order repeat ECHO at this time  Patient presents to [facility] for admission this time at family request  Order IV lasix  Trend troponin  Consult cardiology     Plan for Additional Problems:   · Hyperlipidemia- continue lipitor  · CAD- continue telemetry and trend troponin  Denies chest pain at this time  · afib- on eliquis  · Left knee contusion with hx total knee replacement- will order xray and ortho consult due to severity of bruising     VTE Prophylaxis: Apixaban (Eliquis)  / reason for no mechanical VTE prophylaxis swelling   Code Status: DNR/DNI  POLST: There is no POLST form on file for this patient (pre-hospital)   Anticipated Length of Stay:  Patient will be admitted on an Observation basis with an anticipated length of stay of  Less than 2 midnights     Justification for Hospital Stay: patient requires IV lasix and troponin       Admission Orders:  Scheduled Meds:   apixaban 5 mg Oral BID   aspirin 81 mg Oral Daily   atorvastatin 20 mg Oral Daily   calcium carbonate 625 mg Oral BID With Meals   cholecalciferol 5,000 Units Oral Daily   fish oil 2,000 mg Oral Daily   furosemide 40 mg Intravenous BID (diuretic)   Mirabegron ER 50 mg Oral Daily   polyethylene glycol 17 g Oral Daily   senna-docusate sodium 1 tablet Oral Daily     Continuous Infusions:    PRN Meds:   aluminum-magnesium hydroxide-simethicone    ondansetron    Cardiac diet   Act as galo   EKG prn cp   Daily weight   I&O   L knee xray   10/24 bmp, mg   Serial trop   OT PT eval   Cardiology consult   Ortho consult   O2 keep sat > 92 %   Tele   Orthopedic consult     Ortho consult   10/23   Assessment:  80 y  o female with left knee traumatic bursitis, and general ecchymosis throughout left leg secondary to fall on Eliquis  Status post left total knee replacement    Plan:   · Weight bearing as tolerated  left lower extremity  · PT  · Pain control  · Recommend Ace wrap for compression to left knee and icing regularly  · Continue to monitor the bursa for signs of infection otherwise progress as tolerated  Cardiology consult 10/23  ASSESSMENT AND PLAN:   1  Acute on chronic diastolic congestive heart failure  Agree with intravenous Lasix although may have to increase this dosage  Follow renal function closely- note the family physician has noted that the patient's Chaneta Labrador is probably closer to 199 lb (in the office in the past) and she is about 15 lbs fluid overload  2  Sick sinus syndrome with pacemaker in situ and established atrial fibrillation  Patient is on Eliquis which may not be advisable going forward in light of falls  At this time will stop aspirin since no strong indication for this  Also would stop fish oil-note rate is controlled without AV renan blocking agents  3  Mild aortic stenosis  4    Apparent history of hypertension with [facility] although blood pressure well controlled without specific medications  5    Hyperlipidemia-on statin

## 2017-10-23 NOTE — PLAN OF CARE
Problem: OCCUPATIONAL THERAPY ADULT  Goal: Performs self-care activities at highest level of function for planned discharge setting  See evaluation for individualized goals  Treatment Interventions: ADL retraining, Functional transfer training, Endurance training, Cognitive reorientation, Patient/family training, Equipment evaluation/education, Compensatory technique education, Continued evaluation          See flowsheet documentation for full assessment, interventions and recommendations  Limitation: Decreased ADL status, Decreased UE strength, Decreased Safe judgement during ADL, Decreased cognition, Decreased endurance, Decreased high-level ADLs  Prognosis: Fair  Assessment: Pt is a 87y/o female admitted to the hospital 2* symptoms of SOB  Pt noted with CHF  Pt with recent hospitalization at [address] [place] [address] [address] for CHF  Pt pending L knee x-ray 2* knee contusion from a recent fall  Therapist found pt transferring to [facility] with PCA; assistance provided  Further functional mobility assessment limited pending x-ray/ortho consult  PTA pt states independence with all aspects of her ADLs, transfers, ambulation--with RW; +falls; questionable accuracy of pt's statements 2* hx cognitive decline  Currently pt demonstrated deficits with her functional balance, functional mobility, ADL status, and cognition(i e memory, orientation, problem-solving)        OT Discharge Recommendation: Short Term Rehab

## 2017-10-23 NOTE — PLAN OF CARE
Problem: PHYSICAL THERAPY ADULT  Goal: Performs mobility at highest level of function for planned discharge setting  See evaluation for individualized goals  Treatment/Interventions: Functional transfer training, LE strengthening/ROM, Therapeutic exercise, Endurance training, Patient/family training, Bed mobility, Gait training, Spoke to nursing, Family          See flowsheet documentation for full assessment, interventions and recommendations  Prognosis: Good  Problem List: Decreased strength, Decreased range of motion, Decreased endurance, Impaired balance, Decreased mobility, Decreased cognition, Impaired judgement, Decreased safety awareness, Decreased skin integrity, Obesity, Pain  Assessment: pt is an 89y/o f who presents to [facility] s/p fall at SNF resulting in L knee contusion  imaging (-) for acute fx  ordered WBAT L LE per ortho  PMH significant for hyperlipidemia, CAD, a-fib, + CHF  at baseline, pt mod (I) c mobility c RW  resides at [facility]  oriented to person + time during PT session c re-orientation to place + situation  currently requires min (A)x1 for OOB mobility tasks 2* deficits in strength, balance, gait quality, cognition, pain + activity tolerance noted in PT exam above  Barthel Index 45/100  pt also impulsive attempting to get OOB upon arrival for PT eval  mod verbal cues required for safety awareness c all mobility tasks  ambulated 15'x2 c RW c seated rest for toileting  pt leans on RW c elbows requiring cues for upright posture + hand placement  able to sit OOB in chair c chair alarm activated at end of session  would benefit from skilled PT to maximize functional mobility + return to PLOF  upon d/c, recommend pt return to SNF c PT at next level of care  PT eval of high complexity 2* unstable med status s/p fall resulting in contusion L knee   pt c multiple co-morbidities including impaired cognition impacting PT  presents c mobility deviations above from baseline requiring min (A)x1  Barriers to Discharge: None     Recommendation: Short-term skilled PT, Other (Comment) (return to SNF c PT at next level of care)     PT - OK to Discharge: Yes (to SNF c PT)    See flowsheet documentation for full assessment

## 2017-10-23 NOTE — PHYSICAL THERAPY NOTE
PT Evaluation (26min)  (13:50-14:16)    Past Medical History:   Diagnosis Date    CHF (congestive heart failure) (HCC)     Coronary artery disease     Disease of thyroid gland     Hyperlipidemia         10/23/17 1416   Note Type   Note type Eval only   Pain Assessment   Pain Assessment No/denies pain   Home Living   Type of Home SNF  ([facility])   Home Layout One level   Home Equipment Walker   Prior Function   Level of [place] Needs assistance with ADLs and functional mobility  (ambulates c RW)   Receives Help From Personal care attendant  (SNF staff prn)   ADL Assistance Needs assistance   Falls in the last 6 months (2)   Restrictions/Precautions   Weight Bearing Precautions Per Order Yes   LLE Weight Bearing Per Order WBAT   Other Precautions Cognitive; Chair Alarm; Bed Alarm;O2;Fall Risk;Pain   General   Additional Pertinent History pt presents to [facility] s/p fall at [facility]  imaging (-) acute fx, however presents c patellar ecchymosis  ordered WBAT L LE per ortho  PT consulted for mobility + d/c planning  Family/Caregiver Present Yes  (pt's dtr)   Cognition   Orientation Level Oriented to person;Oriented to time   RUE Assessment   RUE Assessment WFL   RUE Strength   RUE Overall Strength Within Functional Limits - able to perform ADL tasks with strength   LUE Assessment   LUE Assessment WFL   LUE Strength   LUE Overall Strength Within Functional Limits - able to perform ADL tasks with strength   RLE Assessment   RLE Assessment WFL  (4/5)   LLE Assessment   LLE Assessment WFL  (3+/5)   Coordination   Sensation WFL   Bed Mobility   Supine to Sit 5  Supervision   Additional items HOB elevated; Increased time required;Verbal cues; Impulsive   Transfers   Sit to Stand 4  Minimal assistance   Additional items Assist x 1; Armrests; Verbal cues; Increased time required   Stand to Sit 4  Minimal assistance   Additional items Assist x 1; Armrests; Verbal cues  (cueing to remain c in LATISHA of RW)   Toilet transfer 4  Minimal assistance   Additional items Assist x 1;Verbal cues;Standard toilet  (c grab bar)   Ambulation/Elevation   Gait pattern Poor UE support; Antalgic; Forward Flexion;Decreased foot clearance; Excessively slow  (rests elbows on RW)   Gait Assistance 4  Minimal assist   Additional items Assist x 1;Verbal cues   Assistive Device Rolling walker   Distance 15' x2 c seated rest for toileting   Balance   Static Sitting Good   Dynamic Sitting Fair +   Static Standing Fair   Dynamic Standing Fair -   Ambulatory Fair -   Endurance Deficit   Endurance Deficit Yes   Endurance Deficit Description SaO2 89-91% on RA throughout session  stable at 93% on 2 5L O2 sitting in chair  Activity Tolerance   Activity Tolerance Patient limited by fatigue;Patient limited by pain   Nurse [facility]   Assessment   Prognosis Good   Problem List Decreased strength;Decreased range of motion;Decreased endurance; Impaired balance;Decreased mobility; Decreased cognition; Impaired judgement;Decreased safety awareness;Decreased skin integrity;Obesity;Pain   Assessment pt is an 89y/o f who presents to [facility] s/p fall at [facility] resulting in L knee contusion  imaging (-) for acute fx  ordered WBAT L LE per ortho  PMH significant for hyperlipidemia, CAD, a-fib, + CHF  at baseline, pt mod (I) c mobility c RW  resides at [facility]  oriented to person + time during PT session c re-orientation to place + situation  currently requires min (A)x1 for OOB mobility tasks 2* deficits in strength, balance, gait quality, cognition, pain + activity tolerance noted in PT exam above  Barthel Index 45/100  pt also impulsive attempting to get OOB upon arrival for PT eval  mod verbal cues required for safety awareness c all mobility tasks  ambulated 15'x2 c RW c seated rest for toileting  pt leans on RW c elbows requiring cues for upright posture + hand placement  able to sit OOB in chair c chair alarm activated at end of session  would benefit from skilled PT to maximize functional mobility + return to PLOF  upon d/c, recommend pt return to SNF c PT at next level of care  PT eval of high complexity 2* unstable med status s/p fall resulting in contusion L knee  pt c multiple co-morbidities including impaired cognition impacting PT  presents c mobility deviations above from baseline requiring min (A)x1  Barriers to Discharge None   Goals   Patient Goals "to get better"  STG Expiration Date 11/02/17   Short Term Goal #1 1  increase strength 1/2 grade, 2  perform bed mobility mod (I), 3  safely perform transfers c (S), 4  ambulate 150' c (S) c RW s overt loss of balance to safely navigate [facility]   Plan   Treatment/Interventions Functional transfer training;LE strengthening/ROM; Therapeutic exercise; Endurance training;Patient/family training;Bed mobility;Gait training;Spoke to nursing;Family   PT Frequency 5x/wk   Recommendation   Recommendation Short-term skilled PT; Other (Comment)  (return to SNF c PT at next level of care)   PT - OK to Discharge Yes  (to SNF c PT)   Barthel Index   Feeding 10   Bathing 0   Grooming Score 5   Dressing Score 5   Bladder Score 5   Bowels Score 5   Toilet Use Score 5   Transfers (Bed/Chair) Score 10   Mobility (Level Surface) Score 0   Stairs Score 0   Barthel Index Score 45     Michele Lynn, PT

## 2017-10-23 NOTE — CONSULTS
Orthopedics   Vanessa Morrison 80 y o  female MRN: [id number]  Unit/Bed#: E2 -01      Chief Complaint:   left knee pain with ecchymosis and swelling status post fall  HPI:   80 y  o female complaining of left knee pain and ecchymosis status post fall  She has brought to the hospital due to shortness of breath secondary to CHF  The patient is a resident at [facility] and does have some dementia  She does not recall a fall but per history taken last evening with her son by Medicine she had fall about a week and half ago prior to admission to the [facility] for workup of CHF  Those x-rays failed to show fracture the patient was not seen by Ortho  She does have bruising and ecchymosis along the entire posterior aspect of the left leg with significant lower extremity edema  She states she has been walking with her walker and her legs been sore mainly over the anterior aspect of the patella where she has swelling  She denies any redness or fevers  She does have a history of a left total knee replacement which was done at [address] but she does not recall the name of the surgeon  Patient is normally on Eliquis due to history of atrial fibrillation  Review Of Systems:   · Skin: Normal  · Neuro: See HPI  · Musculoskeletal: See HPI  · 14 point review of systems negative except as stated above     Past Medical History:   Past Medical History:   Diagnosis Date    CHF (congestive heart failure) ([address] )     Coronary artery disease     Disease of thyroid gland     Hyperlipidemia        Past Surgical History:   History reviewed  No pertinent surgical history  Family History:  Family history reviewed and non-contributory  History reviewed  No pertinent family history  Social History:  Social History     Social History    Marital status:       Spouse name: N/A    Number of children: N/A    Years of education: N/A     Social History Main Topics    Smoking status: Never Smoker    Smokeless tobacco: None    Alcohol use No    Drug use: No    Sexual activity: Not Asked     Other Topics Concern    None     Social History Narrative    None       Allergies:   No Known Allergies        Labs:    0  Lab Value Date/Time   HCT 37 3 10/23/2017 0627   HCT 38 7 10/22/2017 2212   HGB 12 1 10/23/2017 0627   HGB 12 7 10/22/2017 2212   INR 1 08 10/22/2017 2212   WBC 6 43 10/23/2017 0627   WBC 6 74 10/22/2017 2212       Meds:    Current Facility-Administered Medications:     aluminum-magnesium hydroxide-simethicone (MYLANTA) 200-200-20 mg/5 mL oral suspension 30 mL, 30 mL, Oral, Q6H PRN, Martínez Buchanan PA-C    apixaban (ELIQUIS) tablet 5 mg, 5 mg, Oral, BID, Martínez Single, PA-C, 5 mg at 10/23/17 0951    aspirin (ECOTRIN LOW STRENGTH) EC tablet 81 mg, 81 mg, Oral, Daily, Martínez Buchanan PA-C, 81 mg at 10/23/17 0951    atorvastatin (LIPITOR) tablet 20 mg, 20 mg, Oral, Daily, Martínez Single, PA-C, 20 mg at 10/23/17 0415    calcium carbonate oral suspension 625 mg, 625 mg, Oral, BID With Meals, Martínez Single, PA-C, 625 mg at 10/23/17 3914    cholecalciferol (VITAMIN D3) tablet 5,000 Units, 5,000 Units, Oral, Daily, Martínez Single, PA-C, 5,000 Units at 10/23/17 0948    fish oil capsule 2,000 mg, 2,000 mg, Oral, Daily, Martínez Single, PA-C, 2,000 mg at 10/23/17 0951    furosemide (LASIX) injection 40 mg, 40 mg, Intravenous, BID (diuretic), Martínez Single, PA-C, 40 mg at 10/23/17 0948    Mirabegron ER TB24 50 mg, 50 mg, Oral, Daily, Martínez Single PA-C    ondansetron (ZOFRAN) injection 4 mg, 4 mg, Intravenous, Q6H PRN, Martínez Buchanan PA-C    polyethylene glycol (MIRALAX) packet 17 g, 17 g, Oral, Daily, Martínez Buchanan PA-C    senna-docusate sodium (SENOKOT S) 8 6-50 mg per tablet 1 tablet, 1 tablet, Oral, Daily, Martínez Buchanan PA-C, 1 tablet at 10/23/17 0951    Blood Culture:   No results found for: BLOODCX    Wound Culture:   No results found for: WOUNDCULT    Ins and Outs:  No intake/output data recorded  Physical Exam:   /65   Pulse 62   Temp (!) 96 3 °F (35 7 °C) (Tympanic)   Resp 20   Ht 5' 5" (1 651 m)   Wt 96 kg (211 lb 9 6 oz)   SpO2 93% Comment: 2liter  BMI 35 21 kg/m²   Gen: Alert and oriented to person, place, time  HEENT: EOMI, eyes clear, moist mucus membranes, hearing intact  Respiratory: Bilateral chest rise  No audible wheezing found  Cardiovascular: Regular Rate and Rhythm  Abdomen: soft nontender/nondistended  Musculoskeletal: left lower extremity  · Skin intact, she has significant anterior knee ecchymosis and swelling without erythema  Posteriorly she has significant ecchymosis throughout her calf and thigh attending his symptoms areas  · Tender to palpation over anterior knee and over the posterior aspect of her leg where she has significant ecchymosis  She denies joint line pain at this time  · Can perform striaght leg raise though weaker than the right, and is able to actively extend the knee  · Stable to varus/valgus stress  · Sensation intact L1-S1  · 5/5 strength to hip flexion/extension, knee flexion/extension ankle dorsi/plantar flexion, EHL/FHL      Radiology:   I personally reviewed the films  X-rays left knee shows prosthesis in good position, with good position of the patella no obvious fractures noted  Significant soft tissue swelling noted anteriorly     _*_*_*_*_*_*_*_*_*_*_*_*_*_*_*_*_*_*_*_*_*_*_*_*_*_*_*_*_*_*_*_*_*_*_*_*_*_*_*_*_*    Assessment:  80 y  o female with left knee traumatic bursitis, and general ecchymosis throughout left leg secondary to fall on Eliquis  Status post left total knee replacement  Plan:   · Weight bearing as tolerated  left lower extremity  · PT  · Pain control  · Recommend Ace wrap for compression to left knee and icing regularly  · Continue to monitor the bursa for signs of infection otherwise progress as tolerated      Ernestina Ramos PA-C

## 2017-10-24 LAB
ANION GAP SERPL CALCULATED.3IONS-SCNC: 5 MMOL/L (ref 4–13)
BUN SERPL-MCNC: 17 MG/DL (ref 5–25)
CALCIUM SERPL-MCNC: 8.8 MG/DL (ref 8.3–10.1)
CHLORIDE SERPL-SCNC: 105 MMOL/L (ref 100–108)
CO2 SERPL-SCNC: 33 MMOL/L (ref 21–32)
CREAT SERPL-MCNC: 0.71 MG/DL (ref 0.6–1.3)
GFR SERPL CREATININE-BSD FRML MDRD: 76 ML/MIN/1.73SQ M
GLUCOSE SERPL-MCNC: 106 MG/DL (ref 65–140)
MAGNESIUM SERPL-MCNC: 2.2 MG/DL (ref 1.6–2.6)
POTASSIUM SERPL-SCNC: 3.9 MMOL/L (ref 3.5–5.3)
SODIUM SERPL-SCNC: 143 MMOL/L (ref 136–145)

## 2017-10-24 PROCEDURE — 97110 THERAPEUTIC EXERCISES: CPT

## 2017-10-24 PROCEDURE — 80048 BASIC METABOLIC PNL TOTAL CA: CPT | Performed by: PHYSICIAN ASSISTANT

## 2017-10-24 PROCEDURE — 97116 GAIT TRAINING THERAPY: CPT

## 2017-10-24 PROCEDURE — 83735 ASSAY OF MAGNESIUM: CPT | Performed by: PHYSICIAN ASSISTANT

## 2017-10-24 RX ORDER — FUROSEMIDE 10 MG/ML
80 INJECTION INTRAMUSCULAR; INTRAVENOUS
Status: DISCONTINUED | OUTPATIENT
Start: 2017-10-24 | End: 2017-10-28

## 2017-10-24 RX ORDER — POTASSIUM CHLORIDE 20 MEQ/1
20 TABLET, EXTENDED RELEASE ORAL 2 TIMES DAILY
Status: DISCONTINUED | OUTPATIENT
Start: 2017-10-24 | End: 2017-10-27

## 2017-10-24 RX ADMIN — Medication 1 TABLET: at 08:01

## 2017-10-24 RX ADMIN — CALCIUM CARBONATE 625 MG: 1250 SUSPENSION ORAL at 08:09

## 2017-10-24 RX ADMIN — POTASSIUM CHLORIDE 20 MEQ: 1500 TABLET, EXTENDED RELEASE ORAL at 17:20

## 2017-10-24 RX ADMIN — CALCIUM CARBONATE 625 MG: 1250 SUSPENSION ORAL at 16:18

## 2017-10-24 RX ADMIN — POLYETHYLENE GLYCOL 3350 17 G: 17 POWDER, FOR SOLUTION ORAL at 08:03

## 2017-10-24 RX ADMIN — POTASSIUM CHLORIDE 10 MEQ: 750 TABLET, EXTENDED RELEASE ORAL at 08:04

## 2017-10-24 RX ADMIN — VITAMIN D, TAB 1000IU (100/BT) 5000 UNITS: 25 TAB at 08:00

## 2017-10-24 RX ADMIN — ATORVASTATIN CALCIUM 20 MG: 20 TABLET, FILM COATED ORAL at 08:05

## 2017-10-24 RX ADMIN — FUROSEMIDE 40 MG: 10 INJECTION, SOLUTION INTRAMUSCULAR; INTRAVENOUS at 07:58

## 2017-10-24 RX ADMIN — FUROSEMIDE 80 MG: 10 INJECTION, SOLUTION INTRAMUSCULAR; INTRAVENOUS at 16:13

## 2017-10-24 RX ADMIN — APIXABAN 5 MG: 5 TABLET, FILM COATED ORAL at 08:01

## 2017-10-24 NOTE — PROGRESS NOTES
Orthopedics   Scott Jimenez 80 y o  female MRN: [id number]  Unit/Bed#: E2 -01      Subjective:  80 y  o female complaining of left knee pain and ecchymosis status post fall  She appears to be in less discomfort today and moves easier when asked  She has brought to the hospital due to shortness of breath secondary to CHF  The patient is a resident at [facility] and does have some dementia  She does have bruising and ecchymosis along the entire posterior aspect of the left leg with significant lower extremity edema that has been improving when compared to yesterday morning  She states she has been walking with her walker and her legs been sore mainly over the anterior aspect of the patella where she has swelling  She denies any redness or fevers  She does have a history of a left total knee replacement which was done at [address] [place] [address] but she does not recall the name of the surgeon  Patient is normally on Eliquis due to history of atrial fibrillation            Labs:    0  Lab Value Date/Time   HCT 37 3 10/23/2017 0627   HCT 38 7 10/22/2017 2212   HGB 12 1 10/23/2017 0627   HGB 12 7 10/22/2017 2212   INR 1 08 10/22/2017 2212   WBC 6 43 10/23/2017 0627   WBC 6 74 10/22/2017 2212       Meds:    Current Facility-Administered Medications:     aluminum-magnesium hydroxide-simethicone (MYLANTA) 200-200-20 mg/5 mL oral suspension 30 mL, 30 mL, Oral, Q6H PRN, Jeanette Montenegro, PA-C    apixaban (ELIQUIS) tablet 5 mg, 5 mg, Oral, BID, Jeanette Montenegro, PA-C, 5 mg at 10/23/17 1753    atorvastatin (LIPITOR) tablet 20 mg, 20 mg, Oral, Daily, Jeanette Montenegro, PA-C, 20 mg at 10/23/17 9934    calcium carbonate oral suspension 625 mg, 625 mg, Oral, BID With Meals, Jeanette Montenegro, PA-C, 625 mg at 10/23/17 1753    cholecalciferol (VITAMIN D3) tablet 5,000 Units, 5,000 Units, Oral, Daily, Jeanette Montenegro, PA-C, 5,000 Units at 10/23/17 0948    furosemide (LASIX) injection 40 mg, 40 mg, Intravenous, BID (diuretic), Omie Cutting, PA-C, 40 mg at 10/23/17 1752    Mirabegron ER TB24 50 mg, 50 mg, Oral, Daily, Omie Cutting, PA-C, 50 mg at 10/23/17 1422    ondansetron (ZOFRAN) injection 4 mg, 4 mg, Intravenous, Q6H PRN, Omie Cutting, PA-C    polyethylene glycol (MIRALAX) packet 17 g, 17 g, Oral, Daily, Omie Cutting, PA-C, 17 g at 10/23/17 1250    potassium chloride (K-DUR,KLOR-CON) CR tablet 10 mEq, 10 mEq, Oral, BID, Iraj Maki MD, 10 mEq at 10/23/17 1753    senna-docusate sodium (SENOKOT S) 8 6-50 mg per tablet 1 tablet, 1 tablet, Oral, Daily, Omie Cutting, PA-C, 1 tablet at 10/23/17 0951    Ins and Outs:  I/O last 24 hours: In: 240 [P O :240]  Out: 200 [Urine:200]          Physical Exam:   /65   Pulse 69   Temp (!) 96 2 °F (35 7 °C) (Tympanic)   Resp 20   Ht 5' 5" (1 651 m)   Wt 96 9 kg (213 lb 9 6 oz)   SpO2 94%   BMI 35 54 kg/m²   Gen: Alert and oriented to person, place, time  HEENT: EOMI, eyes clear, moist mucus membranes, hearing intact  Respiratory: Bilateral chest rise  No audible wheezing found  Cardiovascular: Regular Rate and Rhythm  Abdomen: soft nontender/nondistended  Musculoskeletal: left lower extremity  · Skin intact, she has significant anterior knee ecchymosis and swelling without erythema  Posteriorly she has significant ecchymosis throughout her calf and thigh attending his symptoms areas  The ecchymosis and swelling have improved since yesterday over entire leg  · Tender to palpation over anterior knee and over the posterior aspect of her leg where she has significant ecchymosis  She denies joint line pain at this time  · Can perform straight leg raise though weaker than the right, and is able to actively extend the knee  · Stable to varus/valgus stress  · Sensation intact L1-S1  · 5/5 strength to hip flexion/extension, knee flexion/extension ankle dorsi/plantar flexion, EHL/FHL      Radiology:   I personally reviewed the films    X-rays left knee shows prosthesis in good position, with good position of the patella no obvious fractures noted  Significant soft tissue swelling noted anteriorly     _*_*_*_*_*_*_*_*_*_*_*_*_*_*_*_*_*_*_*_*_*_*_*_*_*_*_*_*_*_*_*_*_*_*_*_*_*_*_*_*_*    Assessment:  80 y  o female with left knee traumatic bursitis, and general ecchymosis throughout left leg secondary to fall on Eliquis  Status post left total knee replacement  She is improving with time  Plan:   · Weight bearing as tolerated  left lower extremity  · PT  · Pain control  · Recommend Ace wrap for compression to left knee and icing regularly  · Follow up as outpatient if needed with ortho, otherwise continue to monitor the bursa for signs of infection and continue to progress as tolerated and notify ortho if worsening      Ernestina Ramos PA-C

## 2017-10-24 NOTE — SOCIAL WORK
CM met with patient and daughter at bedside to discuss discharge  Patient reported living at [facility] for the past 4 years  Patient requires minimal assistance with ADL's  Patient denies any VNA services  Patient reports she does not drive and will have transportation at D/C via her daughter  Patient receives help at facility  PCP reported as [facility]; Ahmet Ruano  Pt reports of POA's including her daughter Roseann Baeza and two sons Milton Arriola and Rex Brown  Patient and daughter made aware of CMs name and role  No other needs at present  CM following as needed      - Note created by GALINDO Cruz  - Reviewed by FRANKY Diehl

## 2017-10-24 NOTE — PLAN OF CARE
Problem: PHYSICAL THERAPY ADULT  Goal: Performs mobility at highest level of function for planned discharge setting  See evaluation for individualized goals  Treatment/Interventions: Functional transfer training, LE strengthening/ROM, Therapeutic exercise, Endurance training, Patient/family training, Bed mobility, Gait training, Spoke to nursing, Family          See flowsheet documentation for full assessment, interventions and recommendations  Outcome: Progressing  Prognosis: Good  Problem List: Decreased strength, Decreased range of motion, Decreased endurance, Impaired balance, Decreased mobility, Decreased cognition, Impaired judgement, Decreased safety awareness, Impaired hearing, Decreased skin integrity, Obesity, Pain  Assessment: pt demonstrating progress c PT  progressed ambulation distance to 35' c RW  pt demonstrated improved posture c RW using RW  vs leaning on elbows  cont to require mod verbal cueing prior to descent c transfers to remain c LATISHA of RW  initiated B/L LE ther ex in sitting c AROM x20 reps  remained OOB in chair at end of session c chair alarm activated  will cont skilled PT to further maximize functional mobility + return to PLOF  upon d/c, recommend STR if facility unable to accept pt at currently level of mobility  if pt able to return to facility, recommend PT at next level of care  Barriers to Discharge: None     Recommendation: Short-term skilled PT, Other (Comment) (STR if facility unable to accept pt)     PT - OK to Discharge: Yes (to STR or SNF c PT)    See flowsheet documentation for full assessment

## 2017-10-24 NOTE — PROGRESS NOTES
Progress Note - Cardiology   Yamilka Joshua Childress 80 y o  female MRN: [id number]  Unit/Bed#: E2 -01 Encounter: [id number]      Assessment:     1  Acute on chronic diastolic CHF  2  Persistent AF  3  SSS w/ PPM insitu  4  Mild AS  5  Dyslipidemia  6  Nonobstructive CAD (cath 2010)    Discussion/Recommendations:    · Furosemide increased to 80 BID to augment diuresis--agree  · Eliquis stopped by Dr Tran Prom as risk of falls and bleeding deemed to outweigh benefit  Discussed further w/ pt and daughter who are agreeable and also feel risk of recurrent falls is high  · Start ASA 81 mg/d   · BP controlled, tele ok  Will follow      Subjective: Pt seen/examined    Feels well, better, w/ decreased LE edema      Physical Exam:  GEN:  NAD  HEENT:  MMM, NCAT, pink conjunctiva, EOMI, nonicteric sclera  CV:  NO JVD/HJR, RR, NO M/R/G, +S1/S2, NO PARASTERNAL HEAVE/THRILL, NO LE EDEMA, NO HEPATIC SYSTOLIC PULSATION, WARM EXTREMITIES  RESP:  CTAB/L  ABD:  SOFT, NT, NO GROSS ORGANOMEGALY        Vitals:   /65   Pulse 69   Temp (!) 96 2 °F (35 7 °C) (Tympanic)   Resp 20   Ht 5' 5" (1 651 m)   Wt 96 9 kg (213 lb 9 6 oz)   SpO2 94%   BMI 35 54 kg/m²   Vitals:    10/23/17 0538 10/24/17 0531   Weight: 96 kg (211 lb 9 6 oz) 96 9 kg (213 lb 9 6 oz)       Intake/Output Summary (Last 24 hours) at 10/24/17 1013  Last data filed at 10/24/17 0944   Gross per 24 hour   Intake              240 ml   Output              200 ml   Net               40 ml         TELEMETRY: AF, intermittent V-pacing  Lab Results:    Results from last 7 days  Lab Units 10/23/17  0627   WBC Thousand/uL 6 43   HEMOGLOBIN g/dL 12 1   HEMATOCRIT % 37 3   PLATELETS Thousands/uL 221       Results from last 7 days  Lab Units 10/24/17  0613 10/22/17  2212   SODIUM mmol/L 143 144   POTASSIUM mmol/L 3 9 3 8   CHLORIDE mmol/L 105 103   CO2 mmol/L 33* 34*   BUN mg/dL 17 16   CREATININE mg/dL 0 71 0 85   CALCIUM mg/dL 8 8 9 2   TOTAL PROTEIN g/dL  --  7 0   BILIRUBIN TOTAL mg/dL  --  0 59   ALK PHOS U/L  --  77   ALT U/L  --  25   AST U/L  --  20   GLUCOSE RANDOM mg/dL 106 103       Results from last 7 days  Lab Units 10/24/17  0613   SODIUM mmol/L 143   POTASSIUM mmol/L 3 9   CHLORIDE mmol/L 105   CO2 mmol/L 33*   BUN mg/dL 17   CREATININE mg/dL 0 71   GLUCOSE RANDOM mg/dL 106   CALCIUM mg/dL 8 8             Medications:    Current Facility-Administered Medications:     aluminum-magnesium hydroxide-simethicone (MYLANTA) 200-200-20 mg/5 mL oral suspension 30 mL, 30 mL, Oral, Q6H PRN, Jeremías Chol, PA-C    atorvastatin (LIPITOR) tablet 20 mg, 20 mg, Oral, Daily, Jeremías Chol, PA-C, 20 mg at 10/24/17 0805    calcium carbonate oral suspension 625 mg, 625 mg, Oral, BID With Meals, Jeremías Chol, PA-C, 625 mg at 10/24/17 0809    cholecalciferol (VITAMIN D3) tablet 5,000 Units, 5,000 Units, Oral, Daily, Jeremías Chol, PA-C, 5,000 Units at 10/24/17 0800    furosemide (LASIX) injection 80 mg, 80 mg, Intravenous, BID (diuretic), Regla Jackson MD    Mirabegron ER TB24 50 mg, 50 mg, Oral, Daily, Jeremías Chol, PA-C, 50 mg at 10/24/17 0807    ondansetron (ZOFRAN) injection 4 mg, 4 mg, Intravenous, Q6H PRN, Jeremías Chol, PA-C    polyethylene glycol (MIRALAX) packet 17 g, 17 g, Oral, Daily, Jeremías Chol, PA-C, 17 g at 10/24/17 0803    potassium chloride (K-DUR,KLOR-CON) CR tablet 20 mEq, 20 mEq, Oral, BID, Regla Jackson MD    senna-docusate sodium (SENOKOT S) 8 6-50 mg per tablet 1 tablet, 1 tablet, Oral, Daily, Jeremías Chol, PA-C, 1 tablet at 10/24/17 0801    Portions of the record may have been created with voice recognition software  Occasional wrong word or "sound a like" substitutions may have occurred due to the inherent limitations of voice recognition software  Read the chart carefully and recognize, using context, where substitutions have occurred

## 2017-10-24 NOTE — PHYSICAL THERAPY NOTE
PT Progress Note (24min)  (9:20-9:44)       10/24/17 0944   Pain Assessment   Pain Assessment No/denies pain   Restrictions/Precautions   Weight Bearing Precautions Per Order Yes   LLE Weight Bearing Per Order WBAT   Other Precautions Cognitive; Chair Alarm; Bed Alarm;O2;Fall Risk;Hard of hearing   General   Chart Reviewed Yes   Response to Previous Treatment Patient with no complaints from previous session  Family/Caregiver Present Yes  (dtr arrived at end of session)   Cognition   Orientation Level Oriented to person;Oriented to place;Oriented to situation   Subjective   Subjective pt sitting on BS upon arrival  agreeable to PT  "I fell  that's why my knee is swollen"  Transfers   Sit to Stand 4  Minimal assistance   Additional items Assist x 1;Verbal cues   Stand to Sit 4  Minimal assistance   Additional items Assist x 1; Armrests; Verbal cues   Toilet transfer 4  Minimal assistance   Additional items Assist x 1;Verbal cues; Commode   Ambulation/Elevation   Gait pattern Narrow LATISHA; Forward Flexion;Decreased foot clearance; Antalgic   Gait Assistance 4  Minimal assist   Additional items Assist x 1;Verbal cues   Assistive Device Rolling walker   Distance 35'   Balance   Static Sitting Good   Dynamic Sitting Fair +   Static Standing Fair -   Dynamic Standing Poor +   Ambulatory Poor +   Endurance Deficit   Endurance Deficit Yes   Endurance Deficit Description SaO2 88-89% on RA; stabilized at 91% on 3L O2  no apparent distress noted  Activity Tolerance   Activity Tolerance Patient limited by fatigue   Nurse Made Aware Aarti Helm   Exercises   Quad Sets Sitting;20 reps;AROM; Bilateral   Heelslides Sitting;20 reps;AROM; Bilateral   Glute Sets Sitting;20 reps;AROM; Bilateral   Hip Abduction Sitting;20 reps;AROM; Bilateral   Knee AROM Long Arc Quad Sitting;20 reps;AROM; Bilateral   Ankle Pumps Sitting;20 reps;AROM; Bilateral   Marching Sitting;20 reps;AROM; Bilateral   Assessment   Prognosis Good   Problem List Decreased strength;Decreased range of motion;Decreased endurance; Impaired balance;Decreased mobility; Decreased cognition; Impaired judgement;Decreased safety awareness; Impaired hearing;Decreased skin integrity;Obesity;Pain   Assessment pt demonstrating progress c PT  progressed ambulation distance to 35' c RW  pt demonstrated improved posture c RW using RW  vs leaning on elbows  cont to require mod verbal cueing prior to descent c transfers to remain c LATISHA of RW  initiated B/L LE ther ex in sitting c AROM x20 reps  remained OOB in chair at end of session c chair alarm activated  will cont skilled PT to further maximize functional mobility + return to PLOF  upon d/c, recommend STR if facility unable to accept pt at currently level of mobility  if pt able to return to facility, recommend PT at next level of care  Barriers to Discharge None   Goals   Patient Goals "to get better"  [facility] Expiration Date 11/02/17   Treatment Day 1   Plan   Treatment/Interventions Functional transfer training;LE strengthening/ROM; Therapeutic exercise; Endurance training;Patient/family training;Bed mobility;Gait training;Spoke to nursing;Family   Progress Progressing toward goals   PT Frequency 5x/wk   Recommendation   Recommendation Short-term skilled PT; Other (Comment)  (STR if facility unable to accept pt)   PT - OK to Discharge Yes  (to STR or SNF c PT)     Nestor Chavez PT

## 2017-10-24 NOTE — PLAN OF CARE
Problem: DISCHARGE PLANNING - CARE MANAGEMENT  Goal: Discharge to post-acute care or home with appropriate resources  INTERVENTIONS:  - Conduct assessment to determine patient/family and health care team treatment goals, and need for post-acute services based on payer coverage, community resources, and patient preferences, and barriers to discharge  - Address psychosocial, clinical, and financial barriers to discharge as identified in assessment in conjunction with the patient/family and health care team  - Arrange appropriate level of post-acute services according to patients   needs and preference and payer coverage in collaboration with the physician and health care team  - Communicate with and update the patient/family, physician, and health care team regarding progress on the discharge plan  - Arrange appropriate transportation to post-acute venues  Outcome: Completed Date Met: 10/24/17  No needs at present  Pt to return back to facility after being medically cleared  CM following as needed

## 2017-10-25 LAB
ANION GAP SERPL CALCULATED.3IONS-SCNC: 4 MMOL/L (ref 4–13)
BUN SERPL-MCNC: 18 MG/DL (ref 5–25)
CALCIUM SERPL-MCNC: 9 MG/DL (ref 8.3–10.1)
CHLORIDE SERPL-SCNC: 103 MMOL/L (ref 100–108)
CO2 SERPL-SCNC: 33 MMOL/L (ref 21–32)
CREAT SERPL-MCNC: 0.77 MG/DL (ref 0.6–1.3)
GFR SERPL CREATININE-BSD FRML MDRD: 69 ML/MIN/1.73SQ M
GLUCOSE SERPL-MCNC: 107 MG/DL (ref 65–140)
POTASSIUM SERPL-SCNC: 3.7 MMOL/L (ref 3.5–5.3)
SODIUM SERPL-SCNC: 140 MMOL/L (ref 136–145)

## 2017-10-25 PROCEDURE — 80048 BASIC METABOLIC PNL TOTAL CA: CPT | Performed by: INTERNAL MEDICINE

## 2017-10-25 PROCEDURE — 97535 SELF CARE MNGMENT TRAINING: CPT

## 2017-10-25 PROCEDURE — 97116 GAIT TRAINING THERAPY: CPT

## 2017-10-25 PROCEDURE — 97110 THERAPEUTIC EXERCISES: CPT

## 2017-10-25 PROCEDURE — 97530 THERAPEUTIC ACTIVITIES: CPT

## 2017-10-25 RX ORDER — SPIRONOLACTONE 25 MG/1
12.5 TABLET ORAL DAILY
Status: DISCONTINUED | OUTPATIENT
Start: 2017-10-25 | End: 2017-10-27

## 2017-10-25 RX ORDER — ASPIRIN 81 MG/1
81 TABLET ORAL DAILY
Status: DISCONTINUED | OUTPATIENT
Start: 2017-10-25 | End: 2017-11-01 | Stop reason: HOSPADM

## 2017-10-25 RX ADMIN — ATORVASTATIN CALCIUM 20 MG: 20 TABLET, FILM COATED ORAL at 09:19

## 2017-10-25 RX ADMIN — Medication 1 TABLET: at 09:04

## 2017-10-25 RX ADMIN — CALCIUM CARBONATE 625 MG: 1250 SUSPENSION ORAL at 15:57

## 2017-10-25 RX ADMIN — POTASSIUM CHLORIDE 20 MEQ: 1500 TABLET, EXTENDED RELEASE ORAL at 17:34

## 2017-10-25 RX ADMIN — ENOXAPARIN SODIUM 40 MG: 40 INJECTION SUBCUTANEOUS at 09:05

## 2017-10-25 RX ADMIN — VITAMIN D, TAB 1000IU (100/BT) 5000 UNITS: 25 TAB at 09:04

## 2017-10-25 RX ADMIN — FUROSEMIDE 80 MG: 10 INJECTION, SOLUTION INTRAMUSCULAR; INTRAVENOUS at 09:07

## 2017-10-25 RX ADMIN — ASPIRIN 81 MG: 81 TABLET, COATED ORAL at 10:18

## 2017-10-25 RX ADMIN — FUROSEMIDE 80 MG: 10 INJECTION, SOLUTION INTRAMUSCULAR; INTRAVENOUS at 15:56

## 2017-10-25 RX ADMIN — CALCIUM CARBONATE 625 MG: 1250 SUSPENSION ORAL at 08:43

## 2017-10-25 RX ADMIN — SPIRONOLACTONE 12.5 MG: 25 TABLET, FILM COATED ORAL at 10:18

## 2017-10-25 RX ADMIN — POTASSIUM CHLORIDE 20 MEQ: 1500 TABLET, EXTENDED RELEASE ORAL at 09:04

## 2017-10-25 NOTE — PROGRESS NOTES
Progress Note Candace Dennis 80 y o  female MRN: [id number]    Unit/Bed#: E2 -01 Encounter: [id number]  Subjective:   No chest pain  Denies dyspnea  Still with edema  No palpitations or lightheadedness  Objective:   Vitals: Blood pressure 129/79, pulse 58, temperature (!) 96 °F (35 6 °C), temperature source Tympanic, resp  rate 20, height 5' 5" (1 651 m), weight 93 7 kg (206 lb 9 1 oz), SpO2 97 %  ,Body mass index is 34 38 kg/m²  CMP:   Results from last 7 days  Lab Units 10/25/17  0749  10/22/17  2212   SODIUM mmol/L 140  < > 144   POTASSIUM mmol/L 3 7  < > 3 8   CHLORIDE mmol/L 103  < > 103   CO2 mmol/L 33*  < > 34*   ANION GAP mmol/L 4  < > 7   BUN mg/dL 18  < > 16   CREATININE mg/dL 0 77  < > 0 85   GLUCOSE RANDOM mg/dL 107  < > 103   CALCIUM mg/dL 9 0  < > 9 2   AST U/L  --   --  20   ALT U/L  --   --  25   ALK PHOS U/L  --   --  77   TOTAL PROTEIN g/dL  --   --  7 0   ALBUMIN g/dL  --   --  3 6   BILIRUBIN TOTAL mg/dL  --   --  0 59   EGFR ml/min/1 73sq m 69  < > 61   < > = values in this interval not displayed  Physical exam:  Lungs-somewhat decreased breath sounds  No wheezing rhonchi rales  Heart-irregular with grade 2 systolic murmur at the base  No diastolic murmur rub or gallop  Abdomen-obese  Soft nontender without mass organomegaly  Extremities-2+ bilateral pitting edema with absent foot pulses    Assessment:  1/ acute on chronic diastolic heart failure-patient is starting to show diuresis  Weight is now about 5-10 lb above likely dry weight  On furosemide 80 mg IV b i d   On potassium supplementation  2/ chronic atrial fibrillation  Sick sinus syndrome  Pacemaker in situ  Rate controlled without specific AV renan blocking agents  Eliquis stop due to repetitive falls      3/ mild aortic stenosis  4/ history of hypertension with LVH although patient has normal blood pressure readings here and does not require antihypertensive medications  5/ hyperlipidemia-on statin  6/ nonobstructive CAD on remote catheterization  7/ dementia    Plan: Will restart aspirin 81 mg daily since we have stopped Eliquis  Continue furosemide 80 mg IV b i d  until we reach target we and edema is improved  Will start spironolactone 12 5 mg daily    May have to reduce potassium supplementation but will follow electrolytes closely      Peace Acevedo MD

## 2017-10-25 NOTE — PLAN OF CARE
Problem: PHYSICAL THERAPY ADULT  Goal: Performs mobility at highest level of function for planned discharge setting  See evaluation for individualized goals  Treatment/Interventions: Functional transfer training, LE strengthening/ROM, Therapeutic exercise, Endurance training, Patient/family training, Bed mobility, Gait training, Spoke to nursing, Family          See flowsheet documentation for full assessment, interventions and recommendations  Outcome: Progressing  Prognosis: Good  Problem List: Decreased strength, Decreased range of motion, Decreased endurance, Impaired balance, Decreased mobility, Decreased cognition, Impaired judgement, Decreased safety awareness, Impaired hearing, Decreased skin integrity, Obesity, Pain  Assessment: PT demonstrates difficulty in performing sit to stand transfers from lower surfaces, requiring assistance and verbal cues for proper handplacement and transfer techniques  PT requires verbal cues for safe approach to chair, as pt tends to push walker aside and reach for chair  Pt progressed with ambulation distances to 70'x1 with min assist and verbal cues for improved posture  MiId sob noted with exertion, pt recovers with seated rest break  Pt performs seated b/l le arom exercises x 20 reps with cues for correct exercise techniques  Pt remained seated out of bed in chair with chair alarm activated  Recommend pt return to CM with HHPT, if facility unable to accept pt at current level of mobility then recommend STR facility  Barriers to Discharge: None     Recommendation:  (D/c to CM with PT, if unable recommend STR  )     PT - OK to Discharge: Yes    See flowsheet documentation for full assessment

## 2017-10-25 NOTE — SOCIAL WORK
SON called patient's daughter Bertha Camejo to discuss STR recommendation; daughter is agreeable to STR for patient, however, she would like to discuss choices with family  SNF left at patient's bedside with CM's contact information  CM following

## 2017-10-25 NOTE — PHYSICAL THERAPY NOTE
Physical Therapy Treatment Note       10/25/17 1000   Pain Assessment   Pain Assessment No/denies pain   Pain Score (reports soreness and stiffness, no pain)   Restrictions/Precautions   Weight Bearing Precautions Per Order Yes   LLE Weight Bearing Per Order WBAT   Other Precautions Cognitive; Chair Alarm;O2;Fall Risk;Telemetry   General   Chart Reviewed Yes   Response to Previous Treatment Patient with no complaints from previous session  Cognition   Overall Cognitive Status Impaired   Arousal/Participation Alert   Attention Attends with cues to redirect   Orientation Level Oriented to person;Oriented to place;Oriented to situation;Disoriented to time   Memory Decreased short term memory;Decreased recall of precautions;Decreased recall of recent events   Following Commands Follows one step commands with increased time or repetition   Subjective   Subjective PT reports L knee soreness and stiffness  Pt  agreeable to PT  Bed Mobility   Additional Comments PT seated at edge of bed upon arrival      Transfers   Sit to Stand 4  Minimal assistance   Additional items Assist x 1; Armrests; Increased time required;Verbal cues   Stand to Sit 4  Minimal assistance   Additional items Assist x 1; Armrests; Increased time required;Verbal cues   Ambulation/Elevation   Gait pattern Forward Flexion; Antalgic;Decreased L stance   Gait Assistance 4  Minimal assist   Additional items Assist x 1;Verbal cues   Assistive Device Rolling walker   Distance 70'x1, 10'x1   Balance   Static Sitting Good   Static Standing Fair   Dynamic Standing Poor +   Ambulatory Poor +   Endurance Deficit   Endurance Deficit Yes   Endurance Deficit Description fatigue, mild sob noted with mobility   Activity Tolerance   Activity Tolerance Patient limited by fatigue   Nurse Made Aware Valorie PELLETIER   Exercises   Glute Sets Sitting;20 reps;AROM; Bilateral   Hip Flexion Sitting;20 reps;AROM; Bilateral   Hip Abduction Sitting;20 reps;AROM; Bilateral   Hip Adduction Sitting;20 reps;AROM; Bilateral  (iosmetric hip add )   Knee AROM Long Arc Quad Sitting;20 reps;AROM; Bilateral   Ankle Pumps Sitting;20 reps;AROM; Bilateral   Assessment   Prognosis Good   Problem List Decreased strength;Decreased range of motion;Decreased endurance; Impaired balance;Decreased mobility; Decreased cognition; Impaired judgement;Decreased safety awareness; Impaired hearing;Decreased skin integrity;Obesity;Pain   Assessment PT demonstrates difficulty in performing sit to stand transfers from lower surfaces, requiring assistance and verbal cues for proper handplacement and transfer techniques  PT requires verbal cues for safe approach to chair, as pt tends to push walker aside and reach for chair  Pt progressed with ambulation distances to 70'x1 with min assist and verbal cues for improved posture  MiId sob noted with exertion, pt recovers with seated rest break  Pt performs seated b/l le arom exercises x 20 reps with cues for correct exercise techniques  Pt remained seated out of bed in chair with chair alarm activated  Recommend pt return to  with HHPT, if facility unable to accept pt at current level of mobility then recommend STR facility  Barriers to Discharge None   Goals   Patient Goals " to get better "    [facility] Expiration Date 11/02/17   Treatment Day 2   Plan   Treatment/Interventions Functional transfer training;LE strengthening/ROM; Therapeutic exercise; Endurance training;Cognitive reorientation;Patient/family training;Equipment eval/education; Bed mobility;Gait training;Spoke to nursing   Progress Progressing toward goals   PT Frequency 5x/wk   Recommendation   Recommendation (D/c to CM with PT, if unable recommend STR  )   PT - OK to Discharge Yes       Kimberlee Cannon, TEDDY

## 2017-10-25 NOTE — PROGRESS NOTES
[facility] Internal Medicine Progress Note  Patient: Landry Ast 80 y o  female   MRN: [id number]  PCP: OH Peters  Unit/Bed#: E2 -01 Encounter: [id number]  Date Of Visit: 10/25/17    Assessment:    Principal Problem:    CHF (congestive heart failure) ([facility] [address] )  Active Problems:    Hyperlipidemia    Coronary artery disease    A-fib (HCC)    Knee contusion      Plan:    · CHF from acute on chronic chronic diastolic heart failure still in need of further diuresis and really did not respond appreciably to 40 mg b i d  dosing of IV Lasix 80 mg b i d  now has lost 10 kg  continue to monitor weight dry weight may be as low as 199 presently 206  · Hypertension with moderate left ventricular hypertrophy on recent echocardiogram stable without meds  · Nonobstructive coronary artery disease continue statin  · Chronic atrial fibrillation but given fall history and wide amount of ecchymosis and hematoma in her left leg would favor discontinuation of present dosage of Eliquis  will place on DVT prophylaxis otherwise continue aspirin  · Chest x-ray showed in single view possible right lower lobe opacity however CTA of chest showed no infiltrate or PE  · Patellar hematoma if cholecystic more with fluctuance may need to aspirate orthopedics on consult      VTE Pharmacologic Prophylaxis:   Pharmacologic: Enoxaparin (Lovenox)  Mechanical VTE Prophylaxis in Place: Yes    Discussions with Specialists or Other Care Team Provider:  Yes with Orthopedics and Cardiology    Time Spent for Care: 30 minutes  More than 50% of total time spent on counseling and coordination of care as described above  Subjective:   Seems clear mentally today and now has impulsive admits to need for further physical therapy and possible rehab  Remain short of breath on exertion foot has had diminished peripheral edema overnight weight remains about the same as admission      Objective:     Vitals:   Temp (24hrs), Av 5 °F (35 8 °C), Min:96 °F (35 6 °C), Max:96 7 °F (35 9 °C)    HR:  [57-69] 58  Resp:  [20-22] 20  BP: (120-140)/(53-79) 129/79  SpO2:  [93 %-97 %] 97 %  Body mass index is 34 38 kg/m²  Input and Output Summary (last 24 hours): Intake/Output Summary (Last 24 hours) at 10/25/17 0840  Last data filed at 10/25/17 0200   Gross per 24 hour   Intake              240 ml   Output             1000 ml   Net             -760 ml       Physical Exam:     Physical Exam:   General appearance: alert, appears stated age and cooperative  Head: Normocephalic, without obvious abnormality, atraumatic  Lungs: rales bibasilar  Heart: irregularly irregular rhythm  Abdomen: soft, non-tender; bowel sounds normal; no masses,  no organomegaly  Back: negative  Extremities: extremities normal, atraumatic, no cyanosis or edema, venous stasis dermatitis noted and Has taught area of ecchymosis hematoma over left patella now more localized with areas of ecchymoses now clearing above and below left knee  Neurologic: Grossly normal      Additional Data:     Labs:      Results from last 7 days  Lab Units 10/23/17  0627 10/22/17  2212   WBC Thousand/uL 6 43 6 74   HEMOGLOBIN g/dL 12 1 12 7   HEMATOCRIT % 37 3 38 7   PLATELETS Thousands/uL 221 231   NEUTROS PCT %  --  60   LYMPHS PCT %  --  24   MONOS PCT %  --  10   EOS PCT %  --  5       Results from last 7 days  Lab Units 10/25/17  0749  10/22/17  2212   SODIUM mmol/L 140  < > 144   POTASSIUM mmol/L 3 7  < > 3 8   CHLORIDE mmol/L 103  < > 103   CO2 mmol/L 33*  < > 34*   BUN mg/dL 18  < > 16   CREATININE mg/dL 0 77  < > 0 85   CALCIUM mg/dL 9 0  < > 9 2   TOTAL PROTEIN g/dL  --   --  7 0   BILIRUBIN TOTAL mg/dL  --   --  0 59   ALK PHOS U/L  --   --  77   ALT U/L  --   --  25   AST U/L  --   --  20   GLUCOSE RANDOM mg/dL 107  < > 103   < > = values in this interval not displayed  Results from last 7 days  Lab Units 10/22/17  2212   INR  1 08       * I Have Reviewed All Lab Data Listed Above    * Additional Pertinent Lab Tests Reviewed: [facility] Admission Reviewed    Imaging:  X-ray Chest 2 Views    Result Date: 10/23/2017  Narrative: CHEST INDICATION:  Altered mental status  Shortness of breath  History of CHF  COMPARISON:  December 11, 2012 VIEWS:  Frontal and lateral projections IMAGES:  2 FINDINGS:  Permanent pacemaker appears stable from prior  Heart shadow appears unremarkable  Atherosclerotic vascular calcifications are noted  On the frontal view the patient is somewhat rotated, but there is a focal opacity in the right mid to lower lung field near the heart border which appears new from the previous examination  It is not visible as a discrete finding on the lateral projection  It has smooth borders with roughly triangular shape  This probably should be reassessed on follow-up with repeat dual-energy chest radiograph suggested, preferably with improved positioning  The left lung is clear  There is no pleural fluid or pneumothorax  Visualized osseous structures appear within normal limits for the patient's age  Impression: New focal opacity in the right mid to lower lung field only visible on the frontal view  Etiology indeterminate  Possibly atelectasis or focal infiltrate  Mass not entirely excluded  ##[email or web address]##sigslh Workstation performed: [id number]     Xr Knee 1 Or 2 Vw Left    Result Date: 10/23/2017  Narrative: LEFT KNEE INDICATION:  Left knee injury, fall last night  COMPARISON: 1/9/2013  VIEWS:  AP and lateral IMAGES:  3 FINDINGS: Total knee arthroplasty again identified, with the functioning components in satisfactory alignment  There is no evidence of acute fracture or arthroplasty loosening  There is no joint effusion  No lytic or blastic lesions are seen  There is prepatellar soft tissue swelling in the anterior knee, which might represent bursitis  Impression: No acute osseous abnormality status post total knee arthroplasty    Soft tissue swelling in the anterior knee, raising the question of posttraumatic prepatellar bursitis  Workstation performed: [id number]     [facility] Ed Chest Pe Study    Result Date: 10/23/2017  Narrative: CTA - CHEST WITH IV CONTRAST - PULMONARY ANGIOGRAM INDICATION: Shortness of breath  COMPARISON: None  TECHNIQUE: CTA examination of the chest was performed using angiographic technique according to a protocol specifically tailored to evaluate for pulmonary embolism  Reformatted images were created in axial, sagittal, and coronal planes  In addition, coronal 3D MIP postprocessing was performed on the acquisition scanner  Radiation dose length product (DLP) for this visit:  463 mGy-cm   This examination, like all CT scans performed in the [facility], was performed utilizing techniques to minimize radiation dose exposure, including the use of iterative reconstruction and automated exposure control  IV Contrast:  85 mL of iohexol (OMNIPAQUE)      FINDINGS: PULMONARY ARTERIAL TREE:  No pulmonary embolus is seen  LUNGS:  Lungs are clear  There is no tracheal or endobronchial lesion  PLEURA:  Unremarkable  HEART/AORTA:  Unremarkable for patient's age  MEDIASTINUM AND JESUS:  Unremarkable  CHEST WALL AND LOWER NECK:       Left chest wall pacemaker is present  VISUALIZED STRUCTURES IN THE UPPER ABDOMEN:  Unremarkable  OSSEOUS STRUCTURES:  No acute fracture or destructive osseous lesion  Impression: No evidence of pulmonary embolus  Workstation performed: [id number]     Imaging Reports Reviewed Today Include:  Chest x-ray reviewed and CT imaging  Imaging Personally Reviewed by Myself Includes:    Procedure: X-ray Chest 2 Views    Result Date: 10/23/2017  Narrative: CHEST INDICATION:  Altered mental status  Shortness of breath  History of CHF  COMPARISON:  December 11, 2012 VIEWS:  Frontal and lateral projections IMAGES:  2 FINDINGS:  Permanent pacemaker appears stable from prior  Heart shadow appears unremarkable  Atherosclerotic vascular calcifications are noted  On the frontal view the patient is somewhat rotated, but there is a focal opacity in the right mid to lower lung field near the heart border which appears new from the previous examination  It is not visible as a discrete finding on the lateral projection  It has smooth borders with roughly triangular shape  This probably should be reassessed on follow-up with repeat dual-energy chest radiograph suggested, preferably with improved positioning  The left lung is clear  There is no pleural fluid or pneumothorax  Visualized osseous structures appear within normal limits for the patient's age  Impression: New focal opacity in the right mid to lower lung field only visible on the frontal view  Etiology indeterminate  Possibly atelectasis or focal infiltrate  Mass not entirely excluded  ##sigslh##sigslh Workstation performed: [id number]     Procedure: Xr Knee 1 Or 2 Vw Left    Result Date: 10/23/2017  Narrative: LEFT KNEE INDICATION:  Left knee injury, fall last night  COMPARISON: 1/9/2013  VIEWS:  AP and lateral IMAGES:  3 FINDINGS: Total knee arthroplasty again identified, with the functioning components in satisfactory alignment  There is no evidence of acute fracture or arthroplasty loosening  There is no joint effusion  No lytic or blastic lesions are seen  There is prepatellar soft tissue swelling in the anterior knee, which might represent bursitis  Impression: No acute osseous abnormality status post total knee arthroplasty  Soft tissue swelling in the anterior knee, raising the question of posttraumatic prepatellar bursitis  Workstation performed: [id number]     Procedure: Cta Ed Chest Pe Study    Result Date: 10/23/2017  Narrative: CTA - CHEST WITH IV CONTRAST - PULMONARY ANGIOGRAM INDICATION: Shortness of breath  COMPARISON: None   TECHNIQUE: CTA examination of the chest was performed using angiographic technique according to a protocol specifically tailored to evaluate for pulmonary embolism  Reformatted images were created in axial, sagittal, and coronal planes  In addition, coronal 3D MIP postprocessing was performed on the acquisition scanner  Radiation dose length product (DLP) for this visit:  463 mGy-cm   This examination, like all CT scans performed in the [facility], was performed utilizing techniques to minimize radiation dose exposure, including the use of iterative reconstruction and automated exposure control  IV Contrast:  85 mL of iohexol (OMNIPAQUE)      FINDINGS: PULMONARY ARTERIAL TREE:  No pulmonary embolus is seen  LUNGS:  Lungs are clear  There is no tracheal or endobronchial lesion  PLEURA:  Unremarkable  HEART/AORTA:  Unremarkable for patient's age  MEDIASTINUM AND JESUS:  Unremarkable  CHEST WALL AND LOWER NECK:       Left chest wall pacemaker is present  VISUALIZED STRUCTURES IN THE UPPER ABDOMEN:  Unremarkable  OSSEOUS STRUCTURES:  No acute fracture or destructive osseous lesion  Impression: No evidence of pulmonary embolus  Workstation performed: [id number]        Recent Cultures (last 7 days):           Last 24 Hours Medication List:     atorvastatin 20 mg Oral Daily   calcium carbonate 625 mg Oral BID With Meals   cholecalciferol 5,000 Units Oral Daily   furosemide 80 mg Intravenous BID (diuretic)   Mirabegron ER 50 mg Oral Daily   polyethylene glycol 17 g Oral Daily   potassium chloride 20 mEq Oral BID   senna-docusate sodium 1 tablet Oral Daily        Today, Patient Was Seen By: Jennifer Uribe MD    ** Please Note: Dragon 360 Dictation voice to text software may have been used in the creation of this document   **

## 2017-10-25 NOTE — PLAN OF CARE
Problem: OCCUPATIONAL THERAPY ADULT  Goal: Performs self-care activities at highest level of function for planned discharge setting  See evaluation for individualized goals  Treatment Interventions: ADL retraining, Functional transfer training, Endurance training, Cognitive reorientation, Patient/family training, Equipment evaluation/education, Compensatory technique education, Continued evaluation          See flowsheet documentation for full assessment, interventions and recommendations  Outcome: Progressing  Limitation: Decreased ADL status, Decreased UE strength, Decreased Safe judgement during ADL, Decreased cognition, Decreased endurance, Decreased high-level ADLs  Prognosis: Fair  Assessment: Pt  participated in functional OT session with focus on activity tolerance, independence and safety with basic self care  Pt  presents cooperative, bright affect, repetitive at times  Requires cues for problem solving and direction follow during tasks  Pt  able to complete LB self care with mod assist 2* increased LLE swelling, shortness of breath, and decreased tolerance  Pt  requires min assist for mobility in room with rw 2* forward flexed posture and needed cues to look up  Pt  require cues for use of walker - abandons at times  PTA pt  was independent with basic self care and mobility  From OT standpoint anticipate would benefit from inpt rehab to [facility] independence upon d/c  Will continue to follow        OT Discharge Recommendation: Short Term Rehab

## 2017-10-25 NOTE — OCCUPATIONAL THERAPY NOTE
Occupational Therapy Treatment Note      Josi Grady    10/25/2017    Patient Active Problem List   Diagnosis    CHF (congestive heart failure) (HCC)    Hyperlipidemia    Coronary artery disease    Disease of thyroid gland    A[facility]fib ([facility] [address] )    Knee contusion       Past Medical History:   Diagnosis Date    CHF (congestive heart failure) ([facility] [address] )     Coronary artery disease     Dementia     Disease of thyroid gland     Hyperlipidemia        History reviewed  No pertinent surgical history  10/25/17 1440   Restrictions/Precautions   LLE Weight Bearing Per Order WBAT   Other Precautions Cognitive; Chair Alarm;O2;Fall Risk   Lifestyle   Autonomy Independent PTA - self care, mobiltiy with rw, has assist for showers and meds, meals provided in dining room    Reciprocal Relationships supportive dtr present   Service to Others attended [employer] and 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 , 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    [address] enjoys bridge however no one plays at [employer] - likes to read and enjoys the news   Pain Assessment   Pain Assessment No/denies pain   ADL   Grooming Assistance 5  Supervision/Setup   Grooming Deficit Setup   Grooming Comments requires assist at sink to reposition O2 in order to blow nose , requires cues for problem solving    LB Dressing Assistance 3  Moderate Assistance   LB Dressing Comments requires assist to don L sock as well as for clothing mgmt    Functional Standing Tolerance   Time 5 minutes    Activity standing at sink   Transfers   Sit to Stand 5  Supervision   Functional Mobility   Functional Mobility 4  Minimal assistance   Additional Comments cues for posture and direction- pt  kyphotic - requires cues to look up for direction    Additional items Rolling walker   Cognition   Overall Cognitive Status Impaired   Arousal/Participation Alert   Attention Attends with cues to redirect   Orientation Level Oriented to person;Oriented to place;Oriented to situation   Memory Decreased recall of precautions;Decreased recall of recent events   Following Commands Follows one step commands without difficulty   Comments Pt  requires frequent repetition  Repeatedly asks "why do I have this" referring to O2 and "why do I have this fluid?"    Additional Activities   Additional Activities Comments reviewed importance of activity, energy conservation, deep breathing, and importance of O2    Activity Tolerance   Activity Tolerance Patient limited by fatigue  (shortness of breath)   Assessment   Assessment Pt  participated in functional OT session with focus on activity tolerance, independence and safety with basic self care  Pt  presents cooperative, bright affect, repetitive at times  Requires cues for problem solving and direction follow during tasks  Pt  able to complete LB self care with mod assist 2* increased LLE swelling, shortness of breath, and decreased tolerance  Pt  requires min assist for mobility in room with rw 2* forward flexed posture and needed cues to look up  Pt  require cues for use of walker - abandons at times  PTA pt  was independent with basic self care and mobility  From OT standpoint anticipate would benefit from inpt rehab to [facility] independence upon d/c  Will continue to follow  Plan   Treatment Interventions ADL retraining; Endurance training; Compensatory technique education; Activityengagement; Energy conservation   Goal Expiration Date 11/03/17   Treatment Day 1   OT Frequency 3-5x/wk   Recommendation   OT Discharge Recommendation Short Term Rehab

## 2017-10-25 NOTE — OCCUPATIONAL THERAPY NOTE
Occupational Therapy Evaluation      Maurermiriam Briggs    10/25/2017    Patient Active Problem List   Diagnosis    CHF (congestive heart failure) (HCC)    Hyperlipidemia    Coronary artery disease    Disease of thyroid gland    A[facility]fib ([facility] [address] )    Knee contusion       Past Medical History:   Diagnosis Date    CHF [facility]congestive heart failure) ([facility] )     Coronary artery disease     Dementia     Disease of thyroid gland     Hyperlipidemia        History reviewed  No pertinent surgical history  10/23/17 0920   Note Type   Note type Eval only   Restrictions/Precautions   Other Precautions Cognitive; Chair Alarm; Bed Alarm; Fall Risk   Pain Assessment   Pain Assessment No/denies pain   Home Living   [place] [facility] [place]   Lifestyle   Autonomy PTA pt states independence with all aspects of her ADLs, transfers, ambulation--with RW; +falls   Reciprocal Relationships 3 children   Service to Others worked as a    Intrinsic Gratification watching TV   Psychosocial   Psychosocial (WDL) WDL   Subjective   Subjective "I would like to do some walking "   ADL   Where Assessed Chair   Eating Assistance 6  Modified independent   Grooming Assistance 5  Supervision/Setup   UB Bathing Assistance 5  Supervision/Setup   LB Bathing Assistance 3  Moderate Assistance   UB Dressing Assistance 5  Supervision/Setup   LB Dressing Assistance 3  Moderate Assistance   Transfers   Sit to Stand 4  Minimal assistance   Additional items Assist x 1   Stand to Sit 4  Minimal assistance   Additional items Assist x 1   Functional Mobility   Functional Mobility 4  Minimal assistance   Additional Comments x1   Additional items Rolling walker   Balance   Static Sitting Good   Dynamic Sitting Fair +   Static Standing Fair   Dynamic Standing Fair -   Activity Tolerance   Activity Tolerance Patient tolerated treatment well   Medical Staff Made Aware [id number]   RUE Assessment   RUE Assessment WFL   RUE Strength   RUE Overall Strength Within Functional Limits - able to perform ADL tasks with strength   LUE Assessment   LUE Assessment WFL   LUE Strength   LUE Overall Strength Within Functional Limits - able to perform ADL tasks with strength   Hand Function   Gross Motor Coordination Functional   Fine Motor Coordination Functional   Sensation   Light Touch No apparent deficits   Proprioception   Proprioception No apparent deficits   Vision-Basic Assessment   Current Vision Wears glasses only for reading   Vision - Complex Assessment   Acuity Able to read clock/calendar on wall without difficulty   Perception   Inattention/Neglect Appears intact   Cognition   Overall Cognitive Status Impaired   Arousal/Participation Alert   Attention Attends with cues to redirect   Orientation Level Oriented to person;Oriented to time   Memory Decreased short term memory;Decreased recall of recent events;Decreased recall of precautions   Following Commands Follows one step commands with increased time or repetition   Comments hx memory loss; ? dementia   Assessment   Limitation Decreased ADL status; Decreased UE strength;Decreased Safe judgement during ADL;Decreased cognition;Decreased endurance;Decreased high-level ADLs   Prognosis Fair   Assessment Pt is a 89y/o female admitted to the hospital 2* symptoms of SOB  Pt noted with CHF  Pt with recent hospitalization [facility] for CHF  Pt pending L knee x-ray 2* knee contusion from a recent fall  Therapist found pt transferring to [facility] with PCA; assistance provided  Further functional mobility assessment limited pending x-ray/ortho consult  PTA pt states independence with all aspects of her ADLs, transfers, ambulation--with RW; +falls; questionable accuracy of pt's statements 2* hx cognitive decline  Currently pt demonstrated deficits with her functional balance, functional mobility, ADL status, and cognition(i e memory, orientation, problem-solving)      Goals   Patient Goals "to walk more "   STG Time Frame 3-5 Short Term Goal #1 Pt will tolerate continued cognitive/home-safety assessment and appropriate d/c recommendations will be provided  Short Term Goal #2 Pt will demonstrate proper walker/transfer safety 100% of the time  Short Term Goal  Pt will demonstrate mod I with their sit-stand transfers to assist with completion of their LE dressing  LTG Time Frame (5-10days)   Long Term Goal #1 Pt will demonstrate mod I with their UE and LE bathing/dresssing  Long Term Goal #2 Pt will demonstrate g/g- balance with all functional activities  Long Term Goal Pt will independently verbalize 2-3 potential fall risks/transfer safety hazards and their appropriate compensation techniques  Plan   Treatment Interventions ADL retraining;Functional transfer training; Endurance training;Cognitive reorientation;Patient/family training;Equipment evaluation/education; Compensatory technique education;Continued evaluation   Goal Expiration Date 11/03/17   Treatment Day 0   OT Frequency 3-5x/wk   Recommendation   OT Discharge Recommendation Short Term Rehab   Barthel Index   Feeding 10   Bathing 0   Grooming Score 5   Dressing Score 5   Bladder Score 5   Bowels Score 5   Toilet Use Score 5   Transfers (Bed/Chair) Score 10   Mobility (Level Surface) Score 0   Stairs Score 0   Barthel Index Score 45   Documentation completed by Florin Roberts, note created by Winnie Waters

## 2017-10-26 LAB
ANION GAP SERPL CALCULATED.3IONS-SCNC: 7 MMOL/L (ref 4–13)
BUN SERPL-MCNC: 22 MG/DL (ref 5–25)
CALCIUM SERPL-MCNC: 9.5 MG/DL (ref 8.3–10.1)
CHLORIDE SERPL-SCNC: 101 MMOL/L (ref 100–108)
CO2 SERPL-SCNC: 30 MMOL/L (ref 21–32)
CREAT SERPL-MCNC: 0.87 MG/DL (ref 0.6–1.3)
GFR SERPL CREATININE-BSD FRML MDRD: 60 ML/MIN/1.73SQ M
GLUCOSE SERPL-MCNC: 103 MG/DL (ref 65–140)
GLUCOSE SERPL-MCNC: 123 MG/DL (ref 65–140)
GLUCOSE SERPL-MCNC: 92 MG/DL (ref 65–140)
POTASSIUM SERPL-SCNC: 4.2 MMOL/L (ref 3.5–5.3)
SODIUM SERPL-SCNC: 138 MMOL/L (ref 136–145)

## 2017-10-26 PROCEDURE — 80048 BASIC METABOLIC PNL TOTAL CA: CPT | Performed by: INTERNAL MEDICINE

## 2017-10-26 PROCEDURE — 97532 HB COGNITIVE SKILLS DEVELOPMENT: CPT

## 2017-10-26 PROCEDURE — 97530 THERAPEUTIC ACTIVITIES: CPT

## 2017-10-26 PROCEDURE — 97535 SELF CARE MNGMENT TRAINING: CPT

## 2017-10-26 PROCEDURE — 82948 REAGENT STRIP/BLOOD GLUCOSE: CPT

## 2017-10-26 PROCEDURE — 97110 THERAPEUTIC EXERCISES: CPT

## 2017-10-26 PROCEDURE — 97116 GAIT TRAINING THERAPY: CPT

## 2017-10-26 RX ORDER — METOLAZONE 2.5 MG/1
2.5 TABLET ORAL ONCE
Status: COMPLETED | OUTPATIENT
Start: 2017-10-26 | End: 2017-10-26

## 2017-10-26 RX ADMIN — POLYETHYLENE GLYCOL 3350 17 G: 17 POWDER, FOR SOLUTION ORAL at 10:06

## 2017-10-26 RX ADMIN — Medication 1 TABLET: at 10:05

## 2017-10-26 RX ADMIN — ASPIRIN 81 MG: 81 TABLET, COATED ORAL at 10:01

## 2017-10-26 RX ADMIN — VITAMIN D, TAB 1000IU (100/BT) 5000 UNITS: 25 TAB at 10:03

## 2017-10-26 RX ADMIN — FUROSEMIDE 80 MG: 10 INJECTION, SOLUTION INTRAMUSCULAR; INTRAVENOUS at 08:30

## 2017-10-26 RX ADMIN — POTASSIUM CHLORIDE 20 MEQ: 1500 TABLET, EXTENDED RELEASE ORAL at 18:35

## 2017-10-26 RX ADMIN — CALCIUM CARBONATE 625 MG: 1250 SUSPENSION ORAL at 08:09

## 2017-10-26 RX ADMIN — POTASSIUM CHLORIDE 20 MEQ: 1500 TABLET, EXTENDED RELEASE ORAL at 10:05

## 2017-10-26 RX ADMIN — SPIRONOLACTONE 12.5 MG: 25 TABLET, FILM COATED ORAL at 10:01

## 2017-10-26 RX ADMIN — CALCIUM CARBONATE 625 MG: 1250 SUSPENSION ORAL at 18:34

## 2017-10-26 RX ADMIN — FUROSEMIDE 80 MG: 10 INJECTION, SOLUTION INTRAMUSCULAR; INTRAVENOUS at 18:34

## 2017-10-26 RX ADMIN — METOLAZONE 2.5 MG: 2.5 TABLET ORAL at 15:31

## 2017-10-26 RX ADMIN — ATORVASTATIN CALCIUM 20 MG: 20 TABLET, FILM COATED ORAL at 10:05

## 2017-10-26 NOTE — PROGRESS NOTES
Orthopedics   Licha Coates 80 y o  female MRN: [id number]  Unit/Bed#: E2 -01      Subjective:  80 y  o female complaining of left knee pain and ecchymosis status post fall  She appears to be in less discomfort today and moves easier when asked  She has brought to the hospital due to shortness of breath secondary to CHF  The patient is a resident at [facility] and does have some dementia  She does have bruising and ecchymosis along the entire posterior aspect of the left leg with significant lower extremity edema that has been improving when compared to when I last saw her on 10/24/2017  She states she has been walking with her walker and her legs been sore mainly over the anterior aspect of the patella where she has swelling  She denies any redness or fevers  She does have a history of a left total knee replacement which was done at [facility] but she does not recall the name of the surgeon  Patient is normally on Eliquis due to history of atrial fibrillation  The daughter is at bedside and feels that the swelling does appear to be improving but very slowly            Labs:    0  Lab Value Date/Time   HCT 37 3 10/23/2017 0627   HCT 38 7 10/22/2017 2212   HGB 12 1 10/23/2017 0627   HGB 12 7 10/22/2017 2212   INR 1 08 10/22/2017 2212   WBC 6 43 10/23/2017 0627   WBC 6 74 10/22/2017 2212       Meds:    Current Facility-Administered Medications:     aluminum-magnesium hydroxide-simethicone (MYLANTA) 200-200-20 mg/5 mL oral suspension 30 mL, 30 mL, Oral, Q6H PRN, Shelley Judd PA-C    aspirin (ECOTRIN LOW STRENGTH) EC tablet 81 mg, 81 mg, Oral, Daily, Silvia Kaplan MD, 81 mg at 10/26/17 1001    atorvastatin (LIPITOR) tablet 20 mg, 20 mg, Oral, Daily, Shelley Judd PA-C, 20 mg at 10/26/17 1005    calcium carbonate oral suspension 625 mg, 625 mg, Oral, BID With Meals, Shelley Judd PA-C, 625 mg at 10/26/17 0809    cholecalciferol (VITAMIN D3) tablet 5,000 Units, 5,000 Units, Oral, Daily, Carrie Santana PA-C, 5,000 Units at 10/26/17 1003    furosemide (LASIX) injection 80 mg, 80 mg, Intravenous, BID (diuretic), Magali Jain MD, 80 mg at 10/26/17 0830    Mirabegron ER TB24 50 mg, 50 mg, Oral, Daily, Carrie Santana PA-C, 50 mg at 10/26/17 1006    ondansetron (ZOFRAN) injection 4 mg, 4 mg, Intravenous, Q6H PRN, Carrie Santana PA-C    polyethylene glycol (MIRALAX) packet 17 g, 17 g, Oral, Daily, Carrie Santana PA-C, 17 g at 10/26/17 1006    potassium chloride (K-DUR,KLOR-CON) CR tablet 20 mEq, 20 mEq, Oral, BID, Magali Jain MD, 20 mEq at 10/26/17 1005    senna-docusate sodium (SENOKOT S) 8 6-50 mg per tablet 1 tablet, 1 tablet, Oral, Daily, Carrie Santana PA-C, 1 tablet at 10/26/17 1005    spironolactone (ALDACTONE) tablet 12 5 mg, 12 5 mg, Oral, Daily, Varinder Rich MD, 12 5 mg at 10/26/17 1001    Ins and Outs:  I/O last 24 hours: In: 240 [P O :240]  Out: 1050 [Urine:1050]          Physical Exam:   /55   Pulse 63   Temp (!) 95 9 °F (35 5 °C) (Tympanic)   Resp 18   Ht 5' 5" (1 651 m)   Wt 95 3 kg (210 lb 1 6 oz)   SpO2 95% Comment: 2liter  BMI 34 96 kg/m²     Musculoskeletal: left lower extremity  · Skin intact, she has significant anterior knee ecchymosis and swelling without erythema  I feel this is improving over the past 3 days since she was admitted with no sign of infection  Posteriorly she has significant ecchymosis throughout her calf and thigh which has also improved greatly  · Mildly tender to palpation over anterior knee and over the posterior aspect of her leg where she has significant ecchymosis  She denies joint line pain at this time  · Can perform straight leg raise though weaker than the right, and is able to actively extend the knee    · Stable to varus/valgus stress  · Sensation intact L1-S1  · 5/5 strength to hip flexion/extension, knee flexion/extension ankle dorsi/plantar flexion, EHL/FHL      Radiology:   I personally reviewed the films  X-rays left knee shows prosthesis in good position, with good position of the patella no obvious fractures noted  Significant soft tissue swelling noted anteriorly     _*_*_*_*_*_*_*_*_*_*_*_*_*_*_*_*_*_*_*_*_*_*_*_*_*_*_*_*_*_*_*_*_*_*_*_*_*_*_*_*_*    Assessment:  80 y  o female with left knee traumatic bursitis, and general ecchymosis throughout left leg secondary to fall on Eliquis  Status post left total knee replacement  She is continuing to  improve with time  Plan:   · Weight bearing as tolerated left lower extremity  · At this time I do not recommend aspiration due to the risk of introducing infection to a improving hematoma especially in close proximity to a replaced Joint  · PT  · Pain control  · Continue with Ace wrap for compression to left knee and icing regularly  · Follow up as outpatient if needed with ortho, otherwise continue to monitor the bursa for signs of infection and continue to progress as tolerated and notify ortho if worsening      Daphnie Hernandez PA-C

## 2017-10-26 NOTE — PROGRESS NOTES
Progress Note Melida Grigsby 80 y o  female MRN: [id number]    Unit/Bed#: E2 -01 Encounter: [id number]  Subjective:   Still with edema  Denies much in the way of shortness of breath  Denies chest pain, palpitations or lightheadedness  Objective:   Vitals: Blood pressure 117/55, pulse 63, temperature (!) 95 9 °F (35 5 °C), temperature source Tympanic, resp  rate 18, height 5' 5" (1 651 m), weight 95 3 kg (210 lb 1 6 oz), SpO2 95 %  ,Body mass index is 34 96 kg/m²  CMP:   Results from last 7 days  Lab Units 10/26/17  0346  10/22/17  2212   SODIUM mmol/L 138  < > 144   POTASSIUM mmol/L 4 2  < > 3 8   CHLORIDE mmol/L 101  < > 103   CO2 mmol/L 30  < > 34*   ANION GAP mmol/L 7  < > 7   BUN mg/dL 22  < > 16   CREATININE mg/dL 0 87  < > 0 85   GLUCOSE RANDOM mg/dL 123  < > 103   CALCIUM mg/dL 9 5  < > 9 2   AST U/L  --   --  20   ALT U/L  --   --  25   ALK PHOS U/L  --   --  77   TOTAL PROTEIN g/dL  --   --  7 0   ALBUMIN g/dL  --   --  3 6   BILIRUBIN TOTAL mg/dL  --   --  0 59   EGFR ml/min/1 73sq m 60  < > 61   < > = values in this interval not displayed  Intake/Output Summary (Last 24 hours) at 10/26/17 1322  Last data filed at 10/26/17 1300   Gross per 24 hour   Intake              240 ml   Output             1600 ml   Net            -1360 ml       Physical exam:  Lungs-decreased breath sounds throughout without wheezing rhonchi or rales  Heart-irregular with grade 2 systolic murmur at the base  No diastolic murmur rub or gallop  Abdomen-nontender without mass organomegaly  Extremities-2+ bilateral pitting edema with absent foot pulses    Assessment:  1/ acute on chronic diastolic heart failure-patient apparently diuresing  Weights have been very variable and do not feel we can trust these  Patient is on IV furosemide 80 mg b i d  Spironolactone started yesterday  On potassium supplementation  2/ chronic atrial fibrillation/sick sinus syndrome  Patient has pacemaker in situ    Rate controlled without specific AV renan blocking drugs  Patient now on aspirin  Eliquis stop due to repetitive following  3/ mild aortic stenosis-not problematic  4/ history of hypertension with LVH although normal blood pressure readings without antihypertensive medications  5/ hyperlipidemia-on statin  6/ nonobstructive CAD on remote cardiac catheterization  7/ modest dementia    Plan: Will order metolazone 2 5 mg prior to p m  IV furosemide dosage  Continue same diuretic regimen    Will check BNP assay along with BMP in AM      Tiffany Castellano MD

## 2017-10-26 NOTE — PROGRESS NOTES
Awake from 2300 10/25 until approx  0530 10/26; back and forth multiple times with nursing assistance between chair and bed:  "I just can't seem to get comfortable anywhere "  Assisted to BR multiple times to void, and passed large soft brown BM in toilet  Returned to bed about 0530 and finally sleeping at this time

## 2017-10-26 NOTE — PLAN OF CARE
Problem: PHYSICAL THERAPY ADULT  Goal: Performs mobility at highest level of function for planned discharge setting  See evaluation for individualized goals  Treatment/Interventions: Functional transfer training, LE strengthening/ROM, Therapeutic exercise, Endurance training, Patient/family training, Bed mobility, Gait training, Spoke to nursing, Family          See flowsheet documentation for full assessment, interventions and recommendations  Outcome: Progressing  Prognosis: Good  Problem List: Decreased endurance, Impaired balance, Decreased mobility, Decreased cognition, Decreased safety awareness, Decreased strength, Impaired judgement, Decreased skin integrity, Obesity  Assessment: Pt requires min assist for transfers and ambulation due to decreased balance, activity tolerance and poor safety awareness/ judgement  Pt  requires verbal cues for safe mobility and transfer techniques, safe approach to chair with use of rw and frequent verbal cues for improved posture  Noted davis requiring standing or seated rest breaks  Pt  Progressed with ambulation distances to 80'x1  PT continues to be at increased risk for falls due to decreaed cognition, safety, balance, activity tolerance and mobility  PT tolerated b/l e seated arom exercises well  Inpt rehab is recommended at d/c [facility] functional mobility and safety  Barriers to Discharge: None     Recommendation: Short-term skilled PT     PT - OK to Discharge: Yes    See flowsheet documentation for full assessment

## 2017-10-26 NOTE — PHYSICAL THERAPY NOTE
Physical Therapy Treatment Note     10/26/17 1547   Pain Assessment   Pain Assessment No/denies pain   Pain Score No Pain   Restrictions/Precautions   Weight Bearing Precautions Per Order Yes   LLE Weight Bearing Per Order WBAT   Other Precautions Chair Alarm;Cognitive; Fall Risk;O2   General   Chart Reviewed Yes   Response to Previous Treatment Patient with no complaints from previous session  Family/Caregiver Present Yes  (daughter arived at end of session )   Cognition   Overall Cognitive Status Impaired   Arousal/Participation Alert; Cooperative   Attention Attends with cues to redirect   Orientation Level Oriented to person;Oriented to place;Oriented to situation   Subjective   Subjective Pt out of bed in chair upon arrival Pt  incontinent of urine, slipper socks wet  pt  agreeable to PT      Transfers   Sit to Stand 4  Minimal assistance   Additional items Assist x 1; Armrests; Increased time required;Verbal cues; Impulsive   Stand to Sit 4  Minimal assistance   Additional items Assist x 1; Armrests; Increased time required;Verbal cues; Impulsive   Toilet transfer 4  Minimal assistance   Additional items Assist x 1;Verbal cues;Standard toilet  (use of grab bar)   Ambulation/Elevation   Gait pattern Forward Flexion   Gait Assistance 4  Minimal assist   Additional items Assist x 1;Verbal cues   Assistive Device Rolling walker   Distance 10'x1, 20'x1, 80'x1   Balance   Static Sitting Good   Dynamic Sitting Fair +   Static Standing Fair   Dynamic Standing Poor +   Ambulatory Fair -   Endurance Deficit   Endurance Deficit (fatigue, diaz)   Activity Tolerance   Activity Tolerance Patient limited by fatigue  (DIAZ, Sao2 96-98% on 2l  )   Exercises   Heelslides Sitting;AROM; Bilateral;15 reps   Glute Sets AROM; Bilateral;15 reps   Hip Flexion Sitting;AROM; Bilateral;15 reps   Hip Abduction Sitting;AROM; Bilateral;15 reps   Hip Adduction Standing;AROM; Bilateral;15 reps  (isometric hip add, )   Knee AROM Long Arc Rodrigo Hameed reps;AROM; Bilateral   Ankle Pumps Sitting;15 reps;AROM; Bilateral   Equipment Use   Comments Pt  gown an slipper socks changed due to incontinence of urine   PT requires min assist for toileting, and cg- min assist for steadying assit while perfoming handwahing due to decreased safety  Assessment   Prognosis Good   Problem List Decreased endurance; Impaired balance;Decreased mobility; Decreased cognition;Decreased safety awareness;Decreased strength; Impaired judgement;Decreased skin integrity;Obesity   Assessment Pt requires min assist for transfers and ambulation due to decreased balance, activity tolerance and poor safety awareness/ judgement  Pt  requires verbal cues for safe mobility and transfer techniques, safe approach to chair with use of rw and frequent verbal cues for improved posture  Noted davis requiring standing or seated rest breaks  Pt  Progressed with ambulation distances to 80'x1  PT continues to be at increased risk for falls due to decreaed cognition, safety, balance, activity tolerance and mobility  PT tolerated b/l e seated arom exercises well  Inpt rehab is recommended at d/c [facility] functional mobility and safety  Goals   Patient Goals to gt better   [facility] Expiration Date 11/02/17   Treatment Day 3   Plan   Treatment/Interventions Functional transfer training;LE strengthening/ROM; Therapeutic exercise; Endurance training;Patient/family training;Equipment eval/education; Bed mobility;Gait training;Spoke to nursing;OT   Progress Progressing toward goals   PT Frequency 5x/wk   Recommendation   Recommendation Short-term skilled PT   PT - OK to Discharge Yes         Torsten Jacobson PTA    Time of PT treatment session: [id number]

## 2017-10-26 NOTE — CONSULTS
Please see progress note from today  Patient reports improving right knee swelling and mild pain  She refused attempt at aspiration of prepatellar bursa  Hematoma will most likely resolve with compression alone  Will continue to observe while inpatient  Continue activity as tolerated

## 2017-10-26 NOTE — PLAN OF CARE
Problem: OCCUPATIONAL THERAPY ADULT  Goal: Performs self-care activities at highest level of function for planned discharge setting  See evaluation for individualized goals  Treatment Interventions: ADL retraining, Functional transfer training, Endurance training, Cognitive reorientation, Patient/family training, Equipment evaluation/education, Compensatory technique education, Continued evaluation          See flowsheet documentation for full assessment, interventions and recommendations  Outcome: Progressing  Limitation: Decreased ADL status, Decreased UE strength, Decreased Safe judgement during ADL, Decreased cognition, Decreased endurance, Decreased high-level ADLs  Prognosis: Fair  Assessment: Pt was seen for skilled OT with focus on completion of self care routine, LE dressing, review of energy conservation and compensatory breathing and review of current plan of care  Pt had 3-4 mins of sustained useage of BUE prior to becoming SOB  No LOB noted with functional tasks instance  Pt with F balance/safety awareness  See above levels of A required for all functional tasks  Pt may benefit from further rehab with focus on achieving optimal performance levels with all functional tasks        OT Discharge Recommendation: Short Term Rehab         Comments: Charna Olszewski, OTA

## 2017-10-26 NOTE — OCCUPATIONAL THERAPY NOTE
Occupational Therapy Treatment Note:         10/26/17 1105   Restrictions/Precautions   Weight Bearing Precautions Per Order Yes   LLE Weight Bearing Per Order WBAT   Other Precautions Bed Alarm; Chair Alarm;Cognitive;O2;Pain; Fall Risk   Pain Assessment   Pain Assessment No/denies pain   Pain Score No Pain   Pain Location Knee   ADL   Where Assessed Standing at sink  (chair for use of shampoo cap  )   Grooming Assistance 5  Supervision/Setup   Grooming Deficit Setup;Steadying;Verbal cueing;Supervision/safety;Brushing hair;Wash/dry hands; Wash/dry face   UB Bathing Assistance 4  Minimal Assistance   UB Bathing Deficit Setup   LB Bathing Assistance 4  Minimal Assistance   LB Bathing Deficit Setup;Steadying;Verbal cueing;Supervision/safety; Increased time to complete; Buttocks; Perineal area;Right lower leg including foot; Left lower leg including foot   UB Dressing Assistance 4  Minimal Assistance   UB Dressing Deficit Setup   LB Dressing Assistance 4  Minimal Assistance   LB Dressing Deficit Setup   Shin Rios Deficit Setup;Steadying;Verbal cueing;Supervison/safety; Increased time to complete;Clothing management down;Clothing management up;Grab bar use   Toileting Comments Increased SOB noted with activity  Functional Standing Tolerance   Time 5 mins  Activity functional mobility/self toileting  Comments Pt with incresaed SOB with activity  SAT 99% on 3 liters this tx session  Transfers   Sit to Stand 4  Minimal assistance   Additional items Assist x 1; Increased time required;Verbal cues   Stand to Sit 4  Minimal assistance   Additional items Assist x 1   Stand pivot 4  Minimal assistance   Additional items Assist x 1   Toilet transfer 4  Minimal assistance   Additional items Assist x 1   Additional Comments Pt with no LOB with functional tasks      Functional Mobility   Functional Mobility 4  Minimal assistance   Additional Comments x1   Additional items Rolling walker   Toilet Transfers   Toilet Transfer From [employer] Transfer Type To and from   Toilet Transfer to [employer] Transfer Technique Stand pivot; Ambulating   Toilet Transfers Verbal cues; Minimal assistance   Toilet Transfers Comments Hands on A required due to noted SOB  Cognition   Overall Cognitive Status Impaired   Arousal/Participation Alert   Attention Attends with cues to redirect   Orientation Level Oriented X4   Memory Decreased recall of recent events   Following Commands Follows multistep commands with increased time or repetition   Comments Pt with improved focus to tasks  Pt able to follow tx session and reviewed information with good understanding  Additional Activities   Additional Activities Other (Comment)  (reviewed current plan of care with Pt and daughter  )   Additional Activities Comments Educated Pt on current need for strengthening due to noted deficits  Activity Tolerance   Activity Tolerance Patient limited by fatigue   Medical Staff Made Aware Reported all findings to nursing staff  Assessment   Assessment Pt was seen for skilled OT with focus on completion of self care routine, LE dressing, review of energy conservation and compensatory breathing and review of current plan of care  Pt had 3-4 mins of sustained useage of BUE prior to becoming SOB  No LOB noted with functional tasks instance  Pt with F balance/safety awareness  See above levels of A required for all functional tasks  Pt may benefit from further rehab with focus on achieving optimal performance levels with all functional tasks  Plan   Treatment Interventions ADL retraining;Functional transfer training;Cognitive reorientation; Endurance training   Goal Expiration Date 11/03/17   Treatment Day 2   OT Frequency 3-5x/wk   Recommendation   OT Discharge Recommendation Short Term Rehab   BIN Scott

## 2017-10-26 NOTE — SOCIAL WORK
CM met with patient's daughter Maciel Wick to discuss STR placement; per daughter family's first choice is [facility]; CM made daughter aware of -[facility], children are not interested in referrals outside of [facility] as patient's children live in [facility], second choice is [facility] and third is [facility]; referrals submitted  CM following

## 2017-10-26 NOTE — PROGRESS NOTES
[facility] Internal Medicine Progress Note  Patient: Kevin Keenanr 80 y o  female   MRN: [id number]  PCP: OH Gonzalez  Unit/Bed#: E2 -01 Encounter: [id number]  Date Of Visit: 10/26/17    Assessment:    Principal Problem:    CHF (congestive heart failure) ([facility] )  Active Problems:    Hyperlipidemia    Coronary artery disease    A-fib ([place] [address] )    Knee contusion      Plan:    · CHF from acute on chronic chronic diastolic heart failure still in need of further diuresis and really did not respond appreciably to 40 mg b i d  dosing of IV Lasix 80 mg b i d  now has lost 10 kg  continue to monitor weight dry weight may be as low as 199 yesterday 206 but now up to 210 with continued peripheral edema  Had added Aldactone yesterday per Cardiology  · Hypertension with moderate left ventricular hypertrophy on recent echocardiogram stable without meds  · Nonobstructive coronary artery disease continue statin  · Chronic atrial fibrillation but given fall history and wide amount of ecchymosis and hematoma in her left leg would favor discontinuation of present dosage of Eliquis  will place on DVT prophylaxis otherwise continue aspirin  · Chest x-ray showed in single view possible right lower lobe opacity however CTA of chest showed no infiltrate or PE  · Patellar hematoma  More taut with fluctuance may need to aspirate orthopedics on consult will ask to re-evaluate      VTE Pharmacologic Prophylaxis:   Pharmacologic: Pharmacologic VTE Prophylaxis contraindicated due to Left knee hematoma bleeding at site of injection  Mechanical VTE Prophylaxis in Place: No    Discussions with Specialists or Other Care Team Provider:  Yes with Orthopedics and Cardiology    Time Spent for Care: 30 minutes  More than 50% of total time spent on counseling and coordination of care as described above        Subjective:   Pleasant and in no acute distress she has unfortunately gained 4 lb in weight overnight and nursing reports she has been taking liberal amounts of juices in the absence of a fluid restriction  He denies maegan shortness of breath but is still bothered by her left knee and weight-bearing and pain referred to the left knee cap    Objective:     Vitals:   Temp (24hrs), Av 7 °F (35 9 °C), Min:95 9 °F (35 5 °C), Max:97 3 °F (36 3 °C)    HR:  [63-70] 63  Resp:  [16-20] 18  BP: (117-149)/(55-78) 117/55  SpO2:  [95 %-97 %] 95 %  Body mass index is 34 96 kg/m²  Input and Output Summary (last 24 hours):        Intake/Output Summary (Last 24 hours) at 10/26/17 0904  Last data filed at 10/26/17 0437   Gross per 24 hour   Intake                0 ml   Output              850 ml   Net             -850 ml       Physical Exam:     Physical Exam:   General appearance: alert, appears stated age and cooperative  Head: Normocephalic, without obvious abnormality, atraumatic  Lungs: rales bibasilar  Heart: irregularly irregular rhythm  Abdomen: soft, non-tender; bowel sounds normal; no masses,  no organomegaly  Back: negative  Extremities: extremities normal, atraumatic, no cyanosis or edema, venous stasis dermatitis noted and Has taught area of ecchymosis hematoma over left patella now more localized with areas of ecchymoses now clearing above and below left knee  Neurologic: Grossly normal      Additional Data:     Labs:      Results from last 7 days  Lab Units 10/23/17  0627 10/22/17  2212   WBC Thousand/uL 6 43 6 74   HEMOGLOBIN g/dL 12 1 12 7   HEMATOCRIT % 37 3 38 7   PLATELETS Thousands/uL 221 231   NEUTROS PCT %  --  60   LYMPHS PCT %  --  24   MONOS PCT %  --  10   EOS PCT %  --  5       Results from last 7 days  Lab Units 10/26/17  0346  10/22/17  2212   SODIUM mmol/L 138  < > 144   POTASSIUM mmol/L 4 2  < > 3 8   CHLORIDE mmol/L 101  < > 103   CO2 mmol/L 30  < > 34*   BUN mg/dL 22  < > 16   CREATININE mg/dL 0 87  < > 0 85   CALCIUM mg/dL 9 5  < > 9 2   TOTAL PROTEIN g/dL  --   --  7 0   BILIRUBIN TOTAL mg/dL  --   --  0 59   ALK PHOS U/L --   --  77   ALT U/L  --   --  25   AST U/L  --   --  20   GLUCOSE RANDOM mg/dL 123  < > 103   < > = values in this interval not displayed  Results from last 7 days  Lab Units 10/22/17  2212   INR  1 08       * I Have Reviewed All Lab Data Listed Above  * Additional Pertinent Lab Tests Reviewed: [facility] Admission Reviewed    Imaging:  X-ray Chest 2 Views    Result Date: 10/23/2017  Narrative: CHEST INDICATION:  Altered mental status  Shortness of breath  History of CHF  COMPARISON:  December 11, 2012 VIEWS:  Frontal and lateral projections IMAGES:  2 FINDINGS:  Permanent pacemaker appears stable from prior  Heart shadow appears unremarkable  Atherosclerotic vascular calcifications are noted  On the frontal view the patient is somewhat rotated, but there is a focal opacity in the right mid to lower lung field near the heart border which appears new from the previous examination  It is not visible as a discrete finding on the lateral projection  It has smooth borders with roughly triangular shape  This probably should be reassessed on follow-up with repeat dual-energy chest radiograph suggested, preferably with improved positioning  The left lung is clear  There is no pleural fluid or pneumothorax  Visualized osseous structures appear within normal limits for the patient's age  Impression: New focal opacity in the right mid to lower lung field only visible on the frontal view  Etiology indeterminate  Possibly atelectasis or focal infiltrate  Mass not entirely excluded  ##[email or web address]##sigslh Workstation performed: [id number]     Xr Knee 1 Or 2 Vw Left    Result Date: 10/23/2017  Narrative: LEFT KNEE INDICATION:  Left knee injury, fall last night  COMPARISON: 1/9/2013  VIEWS:  AP and lateral IMAGES:  3 FINDINGS: Total knee arthroplasty again identified, with the functioning components in satisfactory alignment  There is no evidence of acute fracture or arthroplasty loosening   There is no joint effusion  No lytic or blastic lesions are seen  There is prepatellar soft tissue swelling in the anterior knee, which might represent bursitis  Impression: No acute osseous abnormality status post total knee arthroplasty  Soft tissue swelling in the anterior knee, raising the question of posttraumatic prepatellar bursitis  Workstation performed: [id number]     [facility]    Result Date: 10/23/2017  Narrative: CTA - CHEST WITH IV CONTRAST - PULMONARY ANGIOGRAM INDICATION: Shortness of breath  COMPARISON: None  TECHNIQUE: CTA examination of the chest was performed using angiographic technique according to a protocol specifically tailored to evaluate for pulmonary embolism  Reformatted images were created in axial, sagittal, and coronal planes  In addition, coronal 3D MIP postprocessing was performed on the acquisition scanner  Radiation dose length product (DLP) for this visit:  463 mGy-cm   This examination, like all CT scans performed in the [facility], was performed utilizing techniques to minimize radiation dose exposure, including the use of iterative reconstruction and automated exposure control  IV Contrast:  85 mL of iohexol (OMNIPAQUE)      FINDINGS: PULMONARY ARTERIAL TREE:  No pulmonary embolus is seen  LUNGS:  Lungs are clear  There is no tracheal or endobronchial lesion  PLEURA:  Unremarkable  HEART/AORTA:  Unremarkable for patient's age  MEDIASTINUM AND JESUS:  Unremarkable  CHEST WALL AND LOWER NECK:       Left chest wall pacemaker is present  VISUALIZED STRUCTURES IN THE UPPER ABDOMEN:  Unremarkable  OSSEOUS STRUCTURES:  No acute fracture or destructive osseous lesion  Impression: No evidence of pulmonary embolus         Workstation performed: [id number]     Imaging Reports Reviewed Today Include:  Chest x-ray reviewed and CT imaging  Imaging Personally Reviewed by Myself Includes:    Procedure: X-ray Chest 2 Views    Result Date: 10/23/2017  Narrative: CHEST INDICATION:  Altered mental status  Shortness of breath  History of CHF  COMPARISON:  December 11, 2012 VIEWS:  Frontal and lateral projections IMAGES:  2 FINDINGS:  Permanent pacemaker appears stable from prior  Heart shadow appears unremarkable  Atherosclerotic vascular calcifications are noted  On the frontal view the patient is somewhat rotated, but there is a focal opacity in the right mid to lower lung field near the heart border which appears new from the previous examination  It is not visible as a discrete finding on the lateral projection  It has smooth borders with roughly triangular shape  This probably should be reassessed on follow-up with repeat dual-energy chest radiograph suggested, preferably with improved positioning  The left lung is clear  There is no pleural fluid or pneumothorax  Visualized osseous structures appear within normal limits for the patient's age  Impression: New focal opacity in the right mid to lower lung field only visible on the frontal view  Etiology indeterminate  Possibly atelectasis or focal infiltrate  Mass not entirely excluded  ##sigslh##sigslh Workstation performed: [id number]     Procedure: Xr Knee 1 Or 2 Vw Left    Result Date: 10/23/2017  Narrative: LEFT KNEE INDICATION:  Left knee injury, fall last night  COMPARISON: 1/9/2013  VIEWS:  AP and lateral IMAGES:  3 FINDINGS: Total knee arthroplasty again identified, with the functioning components in satisfactory alignment  There is no evidence of acute fracture or arthroplasty loosening  There is no joint effusion  No lytic or blastic lesions are seen  There is prepatellar soft tissue swelling in the anterior knee, which might represent bursitis  Impression: No acute osseous abnormality status post total knee arthroplasty  Soft tissue swelling in the anterior knee, raising the question of posttraumatic prepatellar bursitis   Workstation performed: [id number] Procedure: Cta Ed Chest Pe Study    Result Date: 10/23/2017  Narrative: CTA - CHEST WITH IV CONTRAST - PULMONARY ANGIOGRAM INDICATION: Shortness of breath  COMPARISON: None  TECHNIQUE: CTA examination of the chest was performed using angiographic technique according to a protocol specifically tailored to evaluate for pulmonary embolism  Reformatted images were created in axial, sagittal, and coronal planes  In addition, coronal 3D MIP postprocessing was performed on the acquisition scanner  Radiation dose length product (DLP) for this visit:  463 mGy-cm   This examination, like all CT scans performed in the [facility], was performed utilizing techniques to minimize radiation dose exposure, including the use of iterative reconstruction and automated exposure control  IV Contrast:  85 mL of iohexol (OMNIPAQUE)      FINDINGS: PULMONARY ARTERIAL TREE:  No pulmonary embolus is seen  LUNGS:  Lungs are clear  There is no tracheal or endobronchial lesion  PLEURA:  Unremarkable  HEART/AORTA:  Unremarkable for patient's age  MEDIASTINUM AND JESUS:  Unremarkable  CHEST WALL AND LOWER NECK:       Left chest wall pacemaker is present  VISUALIZED STRUCTURES IN THE UPPER ABDOMEN:  Unremarkable  OSSEOUS STRUCTURES:  No acute fracture or destructive osseous lesion  Impression: No evidence of pulmonary embolus         Workstation performed: [id number]        Recent Cultures (last 7 days):           Last 24 Hours Medication List:     aspirin 81 mg Oral Daily   atorvastatin 20 mg Oral Daily   calcium carbonate 625 mg Oral BID With Meals   cholecalciferol 5,000 Units Oral Daily   enoxaparin 40 mg Subcutaneous Q24H LUKAS   furosemide 80 mg Intravenous BID (diuretic)   Mirabegron ER 50 mg Oral Daily   polyethylene glycol 17 g Oral Daily   potassium chloride 20 mEq Oral BID   senna-docusate sodium 1 tablet Oral Daily   spironolactone 12 5 mg Oral Daily        Today, Patient Was Seen By: Cecelia Monroy MD    ** Please Note: Dragon 360 Dictation voice to text software may have been used in the creation of this document   **

## 2017-10-27 LAB
ANION GAP SERPL CALCULATED.3IONS-SCNC: 9 MMOL/L (ref 4–13)
BUN SERPL-MCNC: 20 MG/DL (ref 5–25)
CALCIUM SERPL-MCNC: 9.5 MG/DL (ref 8.3–10.1)
CHLORIDE SERPL-SCNC: 98 MMOL/L (ref 100–108)
CO2 SERPL-SCNC: 34 MMOL/L (ref 21–32)
CREAT SERPL-MCNC: 0.81 MG/DL (ref 0.6–1.3)
GFR SERPL CREATININE-BSD FRML MDRD: 65 ML/MIN/1.73SQ M
GLUCOSE SERPL-MCNC: 115 MG/DL (ref 65–140)
NT-PROBNP SERPL-MCNC: 954 PG/ML
POTASSIUM SERPL-SCNC: 3.5 MMOL/L (ref 3.5–5.3)
SODIUM SERPL-SCNC: 141 MMOL/L (ref 136–145)

## 2017-10-27 PROCEDURE — 83880 ASSAY OF NATRIURETIC PEPTIDE: CPT | Performed by: INTERNAL MEDICINE

## 2017-10-27 PROCEDURE — 97532 HB COGNITIVE SKILLS DEVELOPMENT: CPT

## 2017-10-27 PROCEDURE — 97535 SELF CARE MNGMENT TRAINING: CPT

## 2017-10-27 PROCEDURE — 80048 BASIC METABOLIC PNL TOTAL CA: CPT | Performed by: INTERNAL MEDICINE

## 2017-10-27 RX ORDER — SPIRONOLACTONE 25 MG/1
25 TABLET ORAL DAILY
Status: DISCONTINUED | OUTPATIENT
Start: 2017-10-28 | End: 2017-11-01 | Stop reason: HOSPADM

## 2017-10-27 RX ORDER — METOLAZONE 2.5 MG/1
2.5 TABLET ORAL ONCE
Status: COMPLETED | OUTPATIENT
Start: 2017-10-27 | End: 2017-10-27

## 2017-10-27 RX ORDER — POTASSIUM CHLORIDE 20 MEQ/1
20 TABLET, EXTENDED RELEASE ORAL
Status: COMPLETED | OUTPATIENT
Start: 2017-10-27 | End: 2017-10-29

## 2017-10-27 RX ADMIN — POTASSIUM CHLORIDE 20 MEQ: 1500 TABLET, EXTENDED RELEASE ORAL at 09:11

## 2017-10-27 RX ADMIN — POTASSIUM CHLORIDE 20 MEQ: 1500 TABLET, EXTENDED RELEASE ORAL at 11:29

## 2017-10-27 RX ADMIN — FUROSEMIDE 80 MG: 10 INJECTION, SOLUTION INTRAMUSCULAR; INTRAVENOUS at 09:12

## 2017-10-27 RX ADMIN — METOLAZONE 2.5 MG: 2.5 TABLET ORAL at 15:08

## 2017-10-27 RX ADMIN — FUROSEMIDE 80 MG: 10 INJECTION, SOLUTION INTRAMUSCULAR; INTRAVENOUS at 16:10

## 2017-10-27 RX ADMIN — VITAMIN D, TAB 1000IU (100/BT) 5000 UNITS: 25 TAB at 09:11

## 2017-10-27 RX ADMIN — ASPIRIN 81 MG: 81 TABLET, COATED ORAL at 09:11

## 2017-10-27 RX ADMIN — ATORVASTATIN CALCIUM 20 MG: 20 TABLET, FILM COATED ORAL at 09:11

## 2017-10-27 RX ADMIN — CALCIUM CARBONATE 625 MG: 1250 SUSPENSION ORAL at 09:11

## 2017-10-27 RX ADMIN — CALCIUM CARBONATE 625 MG: 1250 SUSPENSION ORAL at 16:10

## 2017-10-27 RX ADMIN — POTASSIUM CHLORIDE 20 MEQ: 1500 TABLET, EXTENDED RELEASE ORAL at 16:10

## 2017-10-27 RX ADMIN — SPIRONOLACTONE 12.5 MG: 25 TABLET, FILM COATED ORAL at 09:11

## 2017-10-27 RX ADMIN — Medication 1 TABLET: at 09:11

## 2017-10-27 NOTE — OCCUPATIONAL THERAPY NOTE
Occupational Therapy Treatment Note:         10/27/17 1432   Restrictions/Precautions   Weight Bearing Precautions Per Order Yes   LLE Weight Bearing Per Order WBAT   Other Precautions Chair Alarm;Cognitive; Bed Alarm   Pain Assessment   Pain Assessment No/denies pain   Pain Score No Pain   Pain Location Knee   Diversional Activities Television   ADL   Where Assessed Edge of bed   Grooming Assistance 5  Supervision/Setup   Grooming Deficit Setup; Wash/dry face; Wash/dry hands   Grooming Comments Pt with need for cues to engage in activities  UB Bathing Assistance 5  Supervision/Setup   UB Bathing Deficit Setup   LB Bathing Assistance 3  Moderate Assistance   LB Bathing Deficit Setup;Steadying;Verbal cueing;Supervision/safety; Increased time to complete; Buttocks; Perineal area;Right lower leg including foot; Left lower leg including foot   UB Dressing Assistance 4  Minimal Assistance   UB Dressing Deficit Setup   LB Dressing Assistance 4  Minimal Assistance   LB Dressing Deficit Setup;Steadying; Requires assistive device for steadying;Supervision/safety;Verbal cueing; Increased time to complete; Don/doff L sock; Don/doff R sock   LB Dressing Comments Pt with need for increased A with activity due to noted pain levels  Toileting Assistance  Other (Comment)  (Pt incontinent of bladder this tx session  )   Toileting Deficit Steadying;Setup;Verbal cueing;Supervison/safety; Increased time to complete; Bedside commode;Clothing management down;Clothing management up;Perineal hygiene   Toileting Comments Increased A required for safe hand placement with attempts to use bedside commode  Functional Standing Tolerance   Time 4 mins  Activity functional observation  Cognition   Overall Cognitive Status Impaired   Arousal/Participation Alert; Cooperative   Attention Attends with cues to redirect   Orientation Level Oriented to person   Memory Decreased short term memory;Decreased recall of recent events;Decreased recall of precautions   Following Commands Follows multistep commands with increased time or repetition   Comments Pt able to make her needs known  Activity Tolerance   Activity Tolerance Patient limited by fatigue   Medical Staff Made Aware reported all findings to nursing staff  Assessment   Assessment Pt was seen for skilled OT with focus on completion of self care tasks, functional transfers and review of RW safety with self toileting  Pt with initial noted incontinence of bladder  Increased A required for alisha care instance  Min/Mod A for self toileting  No LOB noted with functional tasks instance  Pt reported interest in eating her lunch  Pt had been sleeping at time of delivery  Pt able to intake following set up with Mod I  See above levels of A required for all functional tasks  Pt may benefit from further rehab with focus on achieving optimal performance levels with all functional tasks  Plan   Treatment Interventions ADL retraining;Functional transfer training; Endurance training;Cognitive reorientation   Goal Expiration Date 11/03/17   Treatment Day 3   OT Frequency 3-5x/wk   Recommendation   OT Discharge Recommendation Short Term Rehab   Lionel Quarles, BIN

## 2017-10-27 NOTE — PROGRESS NOTES
Progress Note Álvaro David 80 y o  female MRN: [id number]    Unit/Bed#: E2 -01 Encounter: [id number]  Subjective:   No dyspnea on oxygen  Denies chest pain  Ongoing edema  No palpitations or lightheadedness  Objective:   Vitals: Blood pressure 125/67, pulse 60, temperature (!) 97 3 °F (36 3 °C), temperature source Tympanic, resp  rate 20, height 5' 5" (1 651 m), weight 93 5 kg (206 lb 2 1 oz), SpO2 100 %  ,Body mass index is 34 3 kg/m²  CMP:   Results from last 7 days  Lab Units 10/27/17  0422  10/22/17  2212   SODIUM mmol/L 141  < > 144   POTASSIUM mmol/L 3 5  < > 3 8   CHLORIDE mmol/L 98*  < > 103   CO2 mmol/L 34*  < > 34*   ANION GAP mmol/L 9  < > 7   BUN mg/dL 20  < > 16   CREATININE mg/dL 0 81  < > 0 85   GLUCOSE RANDOM mg/dL 115  < > 103   CALCIUM mg/dL 9 5  < > 9 2   AST U/L  --   --  20   ALT U/L  --   --  25   ALK PHOS U/L  --   --  77   TOTAL PROTEIN g/dL  --   --  7 0   ALBUMIN g/dL  --   --  3 6   BILIRUBIN TOTAL mg/dL  --   --  0 59   EGFR ml/min/1 73sq m 65  < > 61   < > = values in this interval not displayed  BNP:   Results from last 7 days  Lab Units 10/27/17  0422   SODIUM mmol/L 141   POTASSIUM mmol/L 3 5   CHLORIDE mmol/L 98*   CO2 mmol/L 34*   ANION GAP mmol/L 9   BUN mg/dL 20   CREATININE mg/dL 0 81   GLUCOSE RANDOM mg/dL 115   CALCIUM mg/dL 9 5   EGFR ml/min/1 73sq m 65     Pro bnp 954 (was 1296)      Physical exam:  Lungs-decreased breath sounds throughout without wheezing rhonchi or rales  Heart-irregular with grade 2 systolic murmur at the base  No diastolic murmur rub or gallop  Abdomen-nontender without mass organomegaly  Extremities -1 to 2+ bilateral pitting edema with absent foot pulses    Assessment:  1/ acute on chronic diastolic heart failure  Weight appears to have gone down several lb with use of metolazone prior to IV furosemide yesterday  Potassium is now low normal  Patient is getting spironolactone 12 5 mg daily    2/ chronic atrial fibrillation/sick sinus syndrome/pacemaker in situ-rate control without specific AV renan blocking drugs  Patient off Eliquis due to repetitive falls  Now on aspirin  3/ mild aortic stenosis-not problematic at this time  4/ history of hypertension with LVH but normal blood pressure readings without specific anti hypertensive medications except for diuretic therapy  5/ hyperlipidemia-on statin  6/ nonobstructive CAD on remote catheterization  7/ modest dementia    Plan: Will repeat metolazone 2 5 mg prior to p m  intravenous furosemide  Will increase spironolactone to 25 mg daily  Will increase potassium supplementation to 3 times daily  Repeat electrolytes in a m     Bicarbonate is somewhat elevated and may be coming volume contracted  Perhaps near dry wt and baseline BNP      Shama Corey MD

## 2017-10-27 NOTE — PROGRESS NOTES
Pt slept from 3941-3164; otherwise awake since 2300 10/26  Presently OOB to chair (with chair alarm on), reading magazines and watching TV

## 2017-10-27 NOTE — PROGRESS NOTES
[facility] Progress Note  Patient: Drew Avila 80 y o  female   MRN: [id number]  PCP: [place] Thao  Unit/Bed#: E2 -01 Encounter: [id number]  Date Of Visit: 10/27/17    Assessment:    Principal Problem:    CHF (congestive heart failure) ([facility] [address] )  Active Problems:    Hyperlipidemia    Coronary artery disease    A-fib (HCC)    Knee contusion      Plan:    · CHF from acute on chronic chronic diastolic heart failure still in need of further diuresis and really did not respond appreciably to 40 mg b i d  dosing of IV Lasix 80 mg b i d  got p m  dose of metolazone yesterday and was placed on fluid restriction with about 2 lb weight loss over night with continued peripheral edema  Had added Aldactone  per Cardiology latest BN P 954 from 1200 had good response to metolazone overnight but has borderline hypokalemia this morning  · Hypertension with moderate left ventricular hypertrophy on recent echocardiogram stable without meds  · Nonobstructive coronary artery disease continue statin  · Chronic atrial fibrillation but given fall history and wide amount of ecchymosis and hematoma in her left leg would favor discontinuation of present dosage of Eliquis  will place on DVT prophylaxis otherwise continue aspirin  · Chest x-ray showed in single view possible right lower lobe opacity however CTA of chest showed no infiltrate or PE  · Patellar hematoma per Orthopedics management will be expectant with compression and ice packs patient refused aspiration yesterday and should be self limited continue to monitor      VTE Pharmacologic Prophylaxis:   Pharmacologic: Pharmacologic VTE Prophylaxis contraindicated due to Left knee hematoma bleeding at site of injection  Mechanical VTE Prophylaxis in Place: No    Discussions with Specialists or Other Care Team Provider:  Yes with Orthopedics and Cardiology    Time Spent for Care: 30 minutes    More than 50% of total time spent on counseling and coordination of care as described above  Subjective:   Pleasant and in no acute distress she   He denies maegan shortness of breath but is still bothered by her left knee and weight-bearing and pain referred to the left knee cap but this is improved also she had a bowel movement yesterday    Objective:     Vitals:   Temp (24hrs), Av 1 °F (36 2 °C), Min:96 9 °F (36 1 °C), Max:97 3 °F (36 3 °C)    HR:  [60-67] 60  Resp:  [20] 20  BP: (117-125)/(54-67) 125/67  SpO2:  [97 %-100 %] 100 %  Body mass index is 34 3 kg/m²  Input and Output Summary (last 24 hours):        Intake/Output Summary (Last 24 hours) at 10/27/17 0961  Last data filed at 10/27/17 8694   Gross per 24 hour   Intake              410 ml   Output             1950 ml   Net            -1540 ml       Physical Exam:     Physical Exam:   General appearance: alert, appears stated age and cooperative  Head: Normocephalic, without obvious abnormality, atraumatic  Lungs: rales bibasilar  Heart: irregularly irregular rhythm  Abdomen: soft, non-tender; bowel sounds normal; no masses,  no organomegaly  Back: negative  Extremities: extremities normal, atraumatic, no cyanosis or edema, venous stasis dermatitis noted and Has taught area of ecchymosis hematoma over left patella now more localized with areas of ecchymoses now clearing above and below left knee  Neurologic: Grossly normal      Additional Data:     Labs:      Results from last 7 days  Lab Units 10/23/17  0627 10/22/17  2212   WBC Thousand/uL 6 43 6 74   HEMOGLOBIN g/dL 12 1 12 7   HEMATOCRIT % 37 3 38 7   PLATELETS Thousands/uL 221 231   NEUTROS PCT %  --  60   LYMPHS PCT %  --  24   MONOS PCT %  --  10   EOS PCT %  --  5       Results from last 7 days  Lab Units 10/27/17  0422  10/22/17  2212   SODIUM mmol/L 141  < > 144   POTASSIUM mmol/L 3 5  < > 3 8   CHLORIDE mmol/L 98*  < > 103   CO2 mmol/L 34*  < > 34*   BUN mg/dL 20  < > 16   CREATININE mg/dL 0 81  < > 0 85   CALCIUM mg/dL 9 5  < > 9 2   TOTAL PROTEIN g/dL  --   --  7 0   BILIRUBIN TOTAL mg/dL  --   --  0 59   ALK PHOS U/L  --   --  77   ALT U/L  --   --  25   AST U/L  --   --  20   GLUCOSE RANDOM mg/dL 115  < > 103   < > = values in this interval not displayed  Results from last 7 days  Lab Units 10/22/17  2212   INR  1 08       * I Have Reviewed All Lab Data Listed Above  * Additional Pertinent Lab Tests Reviewed: [facility] Admission Reviewed    Imaging:  X-ray Chest 2 Views    Result Date: 10/23/2017  Narrative: CHEST INDICATION:  Altered mental status  Shortness of breath  History of CHF  COMPARISON:  December 11, 2012 VIEWS:  Frontal and lateral projections IMAGES:  2 FINDINGS:  Permanent pacemaker appears stable from prior  Heart shadow appears unremarkable  Atherosclerotic vascular calcifications are noted  On the frontal view the patient is somewhat rotated, but there is a focal opacity in the right mid to lower lung field near the heart border which appears new from the previous examination  It is not visible as a discrete finding on the lateral projection  It has smooth borders with roughly triangular shape  This probably should be reassessed on follow-up with repeat dual-energy chest radiograph suggested, preferably with improved positioning  The left lung is clear  There is no pleural fluid or pneumothorax  Visualized osseous structures appear within normal limits for the patient's age  Impression: New focal opacity in the right mid to lower lung field only visible on the frontal view  Etiology indeterminate  Possibly atelectasis or focal infiltrate  Mass not entirely excluded  ##sigslh##sigslh Workstation performed: [id number]     Xr Knee 1 Or 2 Vw Left    Result Date: 10/23/2017  Narrative: LEFT KNEE INDICATION:  Left knee injury, fall last night  COMPARISON: 1/9/2013   VIEWS:  AP and lateral IMAGES:  3 FINDINGS: Total knee arthroplasty again identified, with the functioning components in satisfactory alignment  There is no evidence of acute fracture or arthroplasty loosening  There is no joint effusion  No lytic or blastic lesions are seen  There is prepatellar soft tissue swelling in the anterior knee, which might represent bursitis  Impression: No acute osseous abnormality status post total knee arthroplasty  Soft tissue swelling in the anterior knee, raising the question of posttraumatic prepatellar bursitis  Workstation performed: [id number]     [facility] Ed Chest Pe Study    Result Date: 10/23/2017  Narrative: CTA - CHEST WITH IV CONTRAST - PULMONARY ANGIOGRAM INDICATION: Shortness of breath  COMPARISON: None  TECHNIQUE: CTA examination of the chest was performed using angiographic technique according to a protocol specifically tailored to evaluate for pulmonary embolism  Reformatted images were created in axial, sagittal, and coronal planes  In addition, coronal 3D MIP postprocessing was performed on the acquisition scanner  Radiation dose length product (DLP) for this visit:  463 mGy-cm   This examination, like all CT scans performed in the [facility], was performed utilizing techniques to minimize radiation dose exposure, including the use of iterative reconstruction and automated exposure control  IV Contrast:  85 mL of iohexol (OMNIPAQUE)      FINDINGS: PULMONARY ARTERIAL TREE:  No pulmonary embolus is seen  LUNGS:  Lungs are clear  There is no tracheal or endobronchial lesion  PLEURA:  Unremarkable  HEART/AORTA:  Unremarkable for patient's age  MEDIASTINUM AND JESUS:  Unremarkable  CHEST WALL AND LOWER NECK:       Left chest wall pacemaker is present  VISUALIZED STRUCTURES IN THE UPPER ABDOMEN:  Unremarkable  OSSEOUS STRUCTURES:  No acute fracture or destructive osseous lesion  Impression: No evidence of pulmonary embolus         Workstation performed: [id number]     Imaging Reports Reviewed Today Include:  Chest x-ray reviewed and CT imaging  Imaging Personally Reviewed by Myself Includes:    Procedure: X-ray Chest 2 Views    Result Date: 10/23/2017  Narrative: CHEST INDICATION:  Altered mental status  Shortness of breath  History of CHF  COMPARISON:  December 11, 2012 VIEWS:  Frontal and lateral projections IMAGES:  2 FINDINGS:  Permanent pacemaker appears stable from prior  Heart shadow appears unremarkable  Atherosclerotic vascular calcifications are noted  On the frontal view the patient is somewhat rotated, but there is a focal opacity in the right mid to lower lung field near the heart border which appears new from the previous examination  It is not visible as a discrete finding on the lateral projection  It has smooth borders with roughly triangular shape  This probably should be reassessed on follow-up with repeat dual-energy chest radiograph suggested, preferably with improved positioning  The left lung is clear  There is no pleural fluid or pneumothorax  Visualized osseous structures appear within normal limits for the patient's age  Impression: New focal opacity in the right mid to lower lung field only visible on the frontal view  Etiology indeterminate  Possibly atelectasis or focal infiltrate  Mass not entirely excluded  ##sigslh##sigslh Workstation performed: [id number]     Procedure: Xr Knee 1 Or 2 Vw Left    Result Date: 10/23/2017  Narrative: LEFT KNEE INDICATION:  Left knee injury, fall last night  COMPARISON: 1/9/2013  VIEWS:  AP and lateral IMAGES:  3 FINDINGS: Total knee arthroplasty again identified, with the functioning components in satisfactory alignment  There is no evidence of acute fracture or arthroplasty loosening  There is no joint effusion  No lytic or blastic lesions are seen  There is prepatellar soft tissue swelling in the anterior knee, which might represent bursitis  Impression: No acute osseous abnormality status post total knee arthroplasty    Soft tissue swelling in the anterior knee, raising the question of posttraumatic prepatellar bursitis  Workstation performed: [id number]     Procedure: [facility]ta Ed Chest Pe Study    Result Date: 10/23/2017  Narrative: CTA - CHEST WITH IV CONTRAST - PULMONARY ANGIOGRAM INDICATION: Shortness of breath  COMPARISON: None  TECHNIQUE: CTA examination of the chest was performed using angiographic technique according to a protocol specifically tailored to evaluate for pulmonary embolism  Reformatted images were created in axial, sagittal, and coronal planes  In addition, coronal 3D MIP postprocessing was performed on the acquisition scanner  Radiation dose length product (DLP) for this visit:  463 mGy-cm   This examination, like all CT scans performed in the [facility], was performed utilizing techniques to minimize radiation dose exposure, including the use of iterative reconstruction and automated exposure control  IV Contrast:  85 mL of iohexol (OMNIPAQUE)      FINDINGS: PULMONARY ARTERIAL TREE:  No pulmonary embolus is seen  LUNGS:  Lungs are clear  There is no tracheal or endobronchial lesion  PLEURA:  Unremarkable  HEART/AORTA:  Unremarkable for patient's age  MEDIASTINUM AND JESUS:  Unremarkable  CHEST WALL AND LOWER NECK:       Left chest wall pacemaker is present  VISUALIZED STRUCTURES IN THE UPPER ABDOMEN:  Unremarkable  OSSEOUS STRUCTURES:  No acute fracture or destructive osseous lesion  Impression: No evidence of pulmonary embolus         Workstation performed: [id number]        Recent Cultures (last 7 days):           Last 24 Hours Medication List:     aspirin 81 mg Oral Daily   atorvastatin 20 mg Oral Daily   calcium carbonate 625 mg Oral BID With Meals   cholecalciferol 5,000 Units Oral Daily   furosemide 80 mg Intravenous BID (diuretic)   Mirabegron ER 50 mg Oral Daily   polyethylene glycol 17 g Oral Daily   potassium chloride 20 mEq Oral BID   senna-docusate sodium 1 tablet Oral Daily   spironolactone 12 5 mg Oral Daily        Today, Patient Was Seen By: Sandra Smith Ashli Guzman MD    ** Please Note: Dragon 360 Dictation voice to text software may have been used in the creation of this document   **

## 2017-10-27 NOTE — PLAN OF CARE
Problem: OCCUPATIONAL THERAPY ADULT  Goal: Performs self-care activities at highest level of function for planned discharge setting  See evaluation for individualized goals  Treatment Interventions: ADL retraining, Functional transfer training, Endurance training, Cognitive reorientation, Patient/family training, Equipment evaluation/education, Compensatory technique education, Continued evaluation          See flowsheet documentation for full assessment, interventions and recommendations  Outcome: Progressing  Limitation: Decreased ADL status, Decreased UE strength, Decreased Safe judgement during ADL, Decreased cognition, Decreased endurance, Decreased high-level ADLs  Prognosis: Fair  Assessment: Pt was seen for skilled OT with focus on completion of self care tasks, functional transfers and review of RW safety with self toileting  Pt with initial noted incontinence of bladder  Increased A required for alisha care instance  Min/Mod A for self toileting  No LOB noted with functional tasks instance  Pt reported interest in eating her lunch  Pt had been sleeping at time of delivery  Pt able to intake following set up with Mod I  See above levels of A required for all functional tasks  Pt may benefit from further rehab with focus on achieving optimal performance levels with all functional tasks        OT Discharge Recommendation: Short Term Rehab         Comments: BIN Barrientos

## 2017-10-27 NOTE — PLAN OF CARE
DISCHARGE PLANNING     Discharge to home or other facility with appropriate resources Progressing        Knowledge Deficit     Patient/family/caregiver demonstrates understanding of disease process, treatment plan, medications, and discharge instructions Progressing        PAIN - ADULT     Verbalizes/displays adequate comfort level or baseline comfort level Progressing        Potential for Falls     Patient will remain free of falls Progressing        Prexisting or High Potential for Compromised Skin Integrity     Skin integrity is maintained or improved Progressing        SAFETY ADULT     Maintain or return to baseline ADL function Progressing     Maintain or return mobility status to optimal level Progressing        1621

## 2017-10-27 NOTE — PROGRESS NOTES
Pt states, "I will try to get some sleep now "  Assisted from chair back to bed and position of comfort; bed alarm on for pt safety

## 2017-10-28 LAB
ANION GAP SERPL CALCULATED.3IONS-SCNC: 7 MMOL/L (ref 4–13)
BUN SERPL-MCNC: 26 MG/DL (ref 5–25)
CALCIUM SERPL-MCNC: 9.8 MG/DL (ref 8.3–10.1)
CHLORIDE SERPL-SCNC: 97 MMOL/L (ref 100–108)
CO2 SERPL-SCNC: 36 MMOL/L (ref 21–32)
CREAT SERPL-MCNC: 0.84 MG/DL (ref 0.6–1.3)
GFR SERPL CREATININE-BSD FRML MDRD: 62 ML/MIN/1.73SQ M
GLUCOSE SERPL-MCNC: 117 MG/DL (ref 65–140)
POTASSIUM SERPL-SCNC: 3.1 MMOL/L (ref 3.5–5.3)
SODIUM SERPL-SCNC: 140 MMOL/L (ref 136–145)

## 2017-10-28 PROCEDURE — 80048 BASIC METABOLIC PNL TOTAL CA: CPT | Performed by: INTERNAL MEDICINE

## 2017-10-28 RX ORDER — FUROSEMIDE 10 MG/ML
40 INJECTION INTRAMUSCULAR; INTRAVENOUS ONCE
Status: DISCONTINUED | OUTPATIENT
Start: 2017-10-28 | End: 2017-10-31

## 2017-10-28 RX ORDER — FUROSEMIDE 40 MG/1
80 TABLET ORAL DAILY
Status: DISCONTINUED | OUTPATIENT
Start: 2017-10-29 | End: 2017-10-31

## 2017-10-28 RX ORDER — POTASSIUM CHLORIDE 20 MEQ/1
20 TABLET, EXTENDED RELEASE ORAL ONCE
Status: COMPLETED | OUTPATIENT
Start: 2017-10-28 | End: 2017-10-28

## 2017-10-28 RX ADMIN — Medication 1 TABLET: at 09:30

## 2017-10-28 RX ADMIN — CALCIUM CARBONATE 625 MG: 1250 SUSPENSION ORAL at 16:58

## 2017-10-28 RX ADMIN — ASPIRIN 81 MG: 81 TABLET, COATED ORAL at 09:31

## 2017-10-28 RX ADMIN — POTASSIUM CHLORIDE 20 MEQ: 1500 TABLET, EXTENDED RELEASE ORAL at 16:59

## 2017-10-28 RX ADMIN — FUROSEMIDE 80 MG: 10 INJECTION, SOLUTION INTRAMUSCULAR; INTRAVENOUS at 09:56

## 2017-10-28 RX ADMIN — POTASSIUM CHLORIDE 20 MEQ: 1500 TABLET, EXTENDED RELEASE ORAL at 13:37

## 2017-10-28 RX ADMIN — SPIRONOLACTONE 25 MG: 25 TABLET, FILM COATED ORAL at 09:31

## 2017-10-28 RX ADMIN — POTASSIUM CHLORIDE 20 MEQ: 1500 TABLET, EXTENDED RELEASE ORAL at 09:30

## 2017-10-28 RX ADMIN — CALCIUM CARBONATE 625 MG: 1250 SUSPENSION ORAL at 09:32

## 2017-10-28 RX ADMIN — VITAMIN D, TAB 1000IU (100/BT) 5000 UNITS: 25 TAB at 09:31

## 2017-10-28 RX ADMIN — POTASSIUM CHLORIDE 20 MEQ: 1500 TABLET, EXTENDED RELEASE ORAL at 11:55

## 2017-10-28 RX ADMIN — ATORVASTATIN CALCIUM 20 MG: 20 TABLET, FILM COATED ORAL at 09:31

## 2017-10-28 NOTE — PROGRESS NOTES
Progress Note Radha Messer 80 y o  female MRN: [id number]    Unit/Bed#: E2 -01 Encounter: [id number]  Subjective:   Feels groggy  States having dyspnea  No chest pain or palpitations  Edema better  No lightheadedness  Feels "dry"    Objective:   Vitals: Blood pressure 120/59, pulse 70, temperature (!) 96 2 °F (35 7 °C), temperature source Tympanic, resp  rate 20, height 5' 5" (1 651 m), weight 92 4 kg (203 lb 11 3 oz), SpO2 94 %  ,Body mass index is 33 9 kg/m²  CMP:   Results from last 7 days  Lab Units 10/28/17  0601  10/22/17  2212   SODIUM mmol/L 140  < > 144   POTASSIUM mmol/L 3 1*  < > 3 8   CHLORIDE mmol/L 97*  < > 103   CO2 mmol/L 36*  < > 34*   ANION GAP mmol/L 7  < > 7   BUN mg/dL 26*  < > 16   CREATININE mg/dL 0 84  < > 0 85   GLUCOSE RANDOM mg/dL 117  < > 103   CALCIUM mg/dL 9 8  < > 9 2   AST U/L  --   --  20   ALT U/L  --   --  25   ALK PHOS U/L  --   --  77   TOTAL PROTEIN g/dL  --   --  7 0   ALBUMIN g/dL  --   --  3 6   BILIRUBIN TOTAL mg/dL  --   --  0 59   EGFR ml/min/1 73sq m 62  < > 61   < > = values in this interval not displayed  Physical exam:  Lungs-decreased breath sounds throughout without wheezing rhonchi rales  Heart-irregular with well controlled rate  Grade 2 systolic murmur at the base  No diastolic murmur rub or gallop  Abdomen-nontender without mass organomegaly  Extremities -1 to 2+ bilateral pitting edema with more localized edema of the left knee  Absent foot pulses        Assessment:  1  Acute on chronic diastolic heart failure  Weight appears to be down close to baseline  Still has modest edema  Laboratory data suggest contraction alkalosis  2   Chronic atrial fibrillation/sick sinus syndrome/pacemaker in situ  Rate control without specific AV renan blocking drugs  Now off Eliquis due to repetitive falls  Now on aspirin  3  Mild aortic stenosis-not problematic  4  History of hypertension with LVH on echo    Blood pressures have been normal without specific anti hypertensive medications and only  On diuretic therapy  5  Hyperlipidemia-continue statin  6  Nonobstructive coronary disease on remote cardiac catheterization  7  Modest dementia    Plan:   Potassium repletion as per [facility]'s Internal Medicine will switch patient to p o  furosemide 80 mg daily  Note dose at home had been 40 mg daily on discharge and had just been increased to 60 mg b i d  prior to admission here  Continue spironolactone at 25 mg daily  Continue aspirin and statin      Viktoria Krishnan MD

## 2017-10-28 NOTE — PROGRESS NOTES
[facility] Internal [facility] Progress Note  Patient: Jacques An 80 y o  female   MRN: [id number]  PCP: OH Garcia  Unit/Bed#: E2 -01 Encounter: [id number]  Date Of Visit: 10/28/17    Assessment:    Principal Problem:    CHF (congestive heart failure) ([facility] )  Active Problems:    Hyperlipidemia    Coronary artery disease    A-fib ([facility] )    Knee contusion      Plan:    · CHF from acute on chronic chronic diastolic heart failure still in need of further diuresis and really did not respond appreciably to 40 mg b i d  dosing of IV Lasix 80 mg b i d  got p m  dose of metolazone yesterday and was placed on fluid restriction with about 1 kg weight loss over night with continued peripheral edema   Had added Aldactone  per Cardiology latest BN P 954 from 1200 had good response to metolazone overnight but has borderline hypokalemia this morning again probably as direct result of metolazone last 2 days  · Metabolic alkalosis probably from contraction and diuresis would not repeat metolazone today will confer with Cardiology in regards to changing over to oral diuresis consideration to torsemide  · Hypertension with moderate left ventricular hypertrophy on recent echocardiogram stable without meds  · Nonobstructive coronary artery disease continue statin  · Chronic atrial fibrillation but given fall history and wide amount of ecchymosis and hematoma in her left leg would favor discontinuation of present dosage of Eliquis  will place on DVT prophylaxis otherwise continue aspirin  · Chest x-ray showed in single view possible right lower lobe opacity however CTA of chest showed no infiltrate or PE  · Patellar hematoma per Orthopedics management will be expectant with compression and ice packs patient refused aspiration yesterday and should be self limited continue to monitor      VTE Pharmacologic Prophylaxis:   Pharmacologic: Pharmacologic VTE Prophylaxis contraindicated due to Left knee hematoma bleeding at site of injection  Mechanical VTE Prophylaxis in Place: No    Discussions with Specialists or Other Care Team Provider:  Yes with Orthopedics and Cardiology    Time Spent for Care: 30 minutes  More than 50% of total time spent on counseling and coordination of care as described above  Subjective:   Pleasant and in no acute distress she   He denies maegan shortness of breath but is still bothered by her left knee and weight-bearing and pain referred to the left knee cap but this is improved also she had a bowel movement yesterday    Objective:     Vitals:   Temp (24hrs), Av 4 °F (35 8 °C), Min:96 2 °F (35 7 °C), Max:96 8 °F (36 °C)    HR:  [65-70] 70  Resp:  [20-24] 20  BP: (116-152)/(58-62) 120/59  SpO2:  [94 %-99 %] 94 %  Body mass index is 33 9 kg/m²  Input and Output Summary (last 24 hours):        Intake/Output Summary (Last 24 hours) at 10/28/17 0829  Last data filed at 10/28/17 0630   Gross per 24 hour   Intake              720 ml   Output              600 ml   Net              120 ml       Physical Exam:     Physical Exam:   General appearance: alert, appears stated age and cooperative  Head: Normocephalic, without obvious abnormality, atraumatic  Lungs: rales bibasilar  Heart: irregularly irregular rhythm  Abdomen: soft, non-tender; bowel sounds normal; no masses,  no organomegaly  Back: negative  Extremities: extremities normal, atraumatic, no cyanosis or edema, venous stasis dermatitis noted and Has taught area of ecchymosis hematoma over left patella now more localized with areas of ecchymoses now clearing above and below left knee  Neurologic: Grossly normal      Additional Data:     Labs:      Results from last 7 days  Lab Units 10/23/17  0627 10/22/17  2212   WBC Thousand/uL 6 43 6 74   HEMOGLOBIN g/dL 12 1 12 7   HEMATOCRIT % 37 3 38 7   PLATELETS Thousands/uL 221 231   NEUTROS PCT %  --  60   LYMPHS PCT %  --  24   MONOS PCT %  --  10   EOS PCT %  --  5       Results from last 7 days  Lab Units 10/28/17  0601  10/22/17  2212   SODIUM mmol/L 140  < > 144   POTASSIUM mmol/L 3 1*  < > 3 8   CHLORIDE mmol/L 97*  < > 103   CO2 mmol/L 36*  < > 34*   BUN mg/dL 26*  < > 16   CREATININE mg/dL 0 84  < > 0 85   CALCIUM mg/dL 9 8  < > 9 2   TOTAL PROTEIN g/dL  --   --  7 0   BILIRUBIN TOTAL mg/dL  --   --  0 59   ALK PHOS U/L  --   --  77   ALT U/L  --   --  25   AST U/L  --   --  20   GLUCOSE RANDOM mg/dL 117  < > 103   < > = values in this interval not displayed  Results from last 7 days  Lab Units 10/22/17  2212   INR  1 08       * I Have Reviewed All Lab Data Listed Above  * Additional Pertinent Lab Tests Reviewed: [facility] Admission Reviewed    Imaging:  X-ray Chest 2 Views    Result Date: 10/23/2017  Narrative: CHEST INDICATION:  Altered mental status  Shortness of breath  History of CHF  COMPARISON:  December 11, 2012 VIEWS:  Frontal and lateral projections IMAGES:  2 FINDINGS:  Permanent pacemaker appears stable from prior  Heart shadow appears unremarkable  Atherosclerotic vascular calcifications are noted  On the frontal view the patient is somewhat rotated, but there is a focal opacity in the right mid to lower lung field near the heart border which appears new from the previous examination  It is not visible as a discrete finding on the lateral projection  It has smooth borders with roughly triangular shape  This probably should be reassessed on follow-up with repeat dual-energy chest radiograph suggested, preferably with improved positioning  The left lung is clear  There is no pleural fluid or pneumothorax  Visualized osseous structures appear within normal limits for the patient's age  Impression: New focal opacity in the right mid to lower lung field only visible on the frontal view  Etiology indeterminate  Possibly atelectasis or focal infiltrate  Mass not entirely excluded   ##[email or web address]##sigslh Workstation performed: [id number]     Xr Knee 1 Or 2 Vw Left    Result Date: 10/23/2017  Narrative: LEFT KNEE INDICATION:  Left knee injury, fall last night  COMPARISON: 1/9/2013  VIEWS:  AP and lateral IMAGES:  3 FINDINGS: Total knee arthroplasty again identified, with the functioning components in satisfactory alignment  There is no evidence of acute fracture or arthroplasty loosening  There is no joint effusion  No lytic or blastic lesions are seen  There is prepatellar soft tissue swelling in the anterior knee, which might represent bursitis  Impression: No acute osseous abnormality status post total knee arthroplasty  Soft tissue swelling in the anterior knee, raising the question of posttraumatic prepatellar bursitis  Workstation performed: [id number]     [facility] Chest Pe Study    Result Date: 10/23/2017  Narrative: CTA - CHEST WITH IV CONTRAST - PULMONARY ANGIOGRAM INDICATION: Shortness of breath  COMPARISON: None  TECHNIQUE: CTA examination of the chest was performed using angiographic technique according to a protocol specifically tailored to evaluate for pulmonary embolism  Reformatted images were created in axial, sagittal, and coronal planes  In addition, coronal 3D MIP postprocessing was performed on the acquisition scanner  Radiation dose length product (DLP) for this visit:  463 mGy-cm   This examination, like all CT scans performed in the [facility], was performed utilizing techniques to minimize radiation dose exposure, including the use of iterative reconstruction and automated exposure control  IV Contrast:  85 mL of iohexol (OMNIPAQUE)      FINDINGS: PULMONARY ARTERIAL TREE:  No pulmonary embolus is seen  LUNGS:  Lungs are clear  There is no tracheal or endobronchial lesion  PLEURA:  Unremarkable  HEART/AORTA:  Unremarkable for patient's age  MEDIASTINUM AND JESUS:  Unremarkable  CHEST WALL AND LOWER NECK:       Left chest wall pacemaker is present  VISUALIZED STRUCTURES IN THE UPPER ABDOMEN:  Unremarkable   OSSEOUS STRUCTURES:  No acute fracture or destructive osseous lesion  Impression: No evidence of pulmonary embolus  Workstation performed: [id number]     Imaging Reports Reviewed Today Include:  Chest x-ray reviewed and CT imaging  Imaging Personally Reviewed by Myself Includes:    Procedure: X-ray Chest 2 Views    Result Date: 10/23/2017  Narrative: CHEST INDICATION:  Altered mental status  Shortness of breath  History of CHF  COMPARISON:  December 11, 2012 VIEWS:  Frontal and lateral projections IMAGES:  2 FINDINGS:  Permanent pacemaker appears stable from prior  Heart shadow appears unremarkable  Atherosclerotic vascular calcifications are noted  On the frontal view the patient is somewhat rotated, but there is a focal opacity in the right mid to lower lung field near the heart border which appears new from the previous examination  It is not visible as a discrete finding on the lateral projection  It has smooth borders with roughly triangular shape  This probably should be reassessed on follow-up with repeat dual-energy chest radiograph suggested, preferably with improved positioning  The left lung is clear  There is no pleural fluid or pneumothorax  Visualized osseous structures appear within normal limits for the patient's age  Impression: New focal opacity in the right mid to lower lung field only visible on the frontal view  Etiology indeterminate  Possibly atelectasis or focal infiltrate  Mass not entirely excluded  ##sigslh##sigslh Workstation performed: [id number]     Procedure: Xr Knee 1 Or 2 Vw Left    Result Date: 10/23/2017  Narrative: LEFT KNEE INDICATION:  Left knee injury, fall last night  COMPARISON: 1/9/2013  VIEWS:  AP and lateral IMAGES:  3 FINDINGS: Total knee arthroplasty again identified, with the functioning components in satisfactory alignment  There is no evidence of acute fracture or arthroplasty loosening  There is no joint effusion  No lytic or blastic lesions are seen   There is prepatellar soft tissue swelling in the anterior knee, which might represent bursitis  Impression: No acute osseous abnormality status post total knee arthroplasty  Soft tissue swelling in the anterior knee, raising the question of posttraumatic prepatellar bursitis  Workstation performed: [id number]     Procedure: Cta Ed Chest Pe Study    Result Date: 10/23/2017  Narrative: CTA - CHEST WITH IV CONTRAST - PULMONARY ANGIOGRAM INDICATION: Shortness of breath  COMPARISON: None  TECHNIQUE: CTA examination of the chest was performed using angiographic technique according to a protocol specifically tailored to evaluate for pulmonary embolism  Reformatted images were created in axial, sagittal, and coronal planes  In addition, coronal 3D MIP postprocessing was performed on the acquisition scanner  Radiation dose length product (DLP) for this visit:  463 mGy-cm   This examination, like all CT scans performed in the [facility], was performed utilizing techniques to minimize radiation dose exposure, including the use of iterative reconstruction and automated exposure control  IV Contrast:  85 mL of iohexol (OMNIPAQUE)      FINDINGS: PULMONARY ARTERIAL TREE:  No pulmonary embolus is seen  LUNGS:  Lungs are clear  There is no tracheal or endobronchial lesion  PLEURA:  Unremarkable  HEART/AORTA:  Unremarkable for patient's age  MEDIASTINUM AND JESUS:  Unremarkable  CHEST WALL AND LOWER NECK:       Left chest wall pacemaker is present  VISUALIZED STRUCTURES IN THE UPPER ABDOMEN:  Unremarkable  OSSEOUS STRUCTURES:  No acute fracture or destructive osseous lesion  Impression: No evidence of pulmonary embolus         Workstation performed: [id number]        Recent Cultures (last 7 days):           Last 24 Hours Medication List:     aspirin 81 mg Oral Daily   atorvastatin 20 mg Oral Daily   calcium carbonate 625 mg Oral BID With Meals   cholecalciferol 5,000 Units Oral Daily   furosemide 80 mg Intravenous BID (diuretic)   Mirabegron ER 50 mg Oral Daily   potassium chloride 20 mEq Oral TID With Meals   senna-docusate sodium 1 tablet Oral Daily   spironolactone 25 mg Oral Daily        Today, Patient Was Seen By: Cecelia Monroy MD    ** Please Note: Dragon 360 Dictation voice to text software may have been used in the creation of this document   **

## 2017-10-29 LAB
ANION GAP SERPL CALCULATED.3IONS-SCNC: 5 MMOL/L (ref 4–13)
BUN SERPL-MCNC: 33 MG/DL (ref 5–25)
CALCIUM SERPL-MCNC: 10.1 MG/DL (ref 8.3–10.1)
CHLORIDE SERPL-SCNC: 99 MMOL/L (ref 100–108)
CO2 SERPL-SCNC: 36 MMOL/L (ref 21–32)
CREAT SERPL-MCNC: 0.92 MG/DL (ref 0.6–1.3)
GFR SERPL CREATININE-BSD FRML MDRD: 56 ML/MIN/1.73SQ M
GLUCOSE SERPL-MCNC: 123 MG/DL (ref 65–140)
POTASSIUM SERPL-SCNC: 3.4 MMOL/L (ref 3.5–5.3)
SODIUM SERPL-SCNC: 140 MMOL/L (ref 136–145)

## 2017-10-29 PROCEDURE — 80048 BASIC METABOLIC PNL TOTAL CA: CPT | Performed by: INTERNAL MEDICINE

## 2017-10-29 RX ORDER — POTASSIUM CHLORIDE 20 MEQ/1
20 TABLET, EXTENDED RELEASE ORAL DAILY
Status: DISCONTINUED | OUTPATIENT
Start: 2017-10-30 | End: 2017-11-01 | Stop reason: HOSPADM

## 2017-10-29 RX ADMIN — ATORVASTATIN CALCIUM 20 MG: 20 TABLET, FILM COATED ORAL at 08:50

## 2017-10-29 RX ADMIN — VITAMIN D, TAB 1000IU (100/BT) 5000 UNITS: 25 TAB at 08:50

## 2017-10-29 RX ADMIN — ASPIRIN 81 MG: 81 TABLET, COATED ORAL at 08:50

## 2017-10-29 RX ADMIN — POTASSIUM CHLORIDE 20 MEQ: 1500 TABLET, EXTENDED RELEASE ORAL at 12:46

## 2017-10-29 RX ADMIN — POTASSIUM CHLORIDE 20 MEQ: 1500 TABLET, EXTENDED RELEASE ORAL at 17:27

## 2017-10-29 RX ADMIN — CALCIUM CARBONATE 625 MG: 1250 SUSPENSION ORAL at 17:27

## 2017-10-29 RX ADMIN — FUROSEMIDE 80 MG: 40 TABLET ORAL at 08:50

## 2017-10-29 RX ADMIN — SPIRONOLACTONE 25 MG: 25 TABLET, FILM COATED ORAL at 08:50

## 2017-10-29 RX ADMIN — Medication 1 TABLET: at 08:50

## 2017-10-29 RX ADMIN — CALCIUM CARBONATE 625 MG: 1250 SUSPENSION ORAL at 08:51

## 2017-10-29 RX ADMIN — POTASSIUM CHLORIDE 20 MEQ: 1500 TABLET, EXTENDED RELEASE ORAL at 08:50

## 2017-10-29 NOTE — PROGRESS NOTES
Progress Note Redd Castillo 80 y o  female MRN: [id number]    Unit/Bed#: E2 -01 Encounter: [id number]  Subjective:   Breathing easier  Less edema  No chest pain  No palpitations or lightheadedness  Fatigue is better  Objective:   Vitals: Blood pressure 104/62, pulse 70, temperature (!) 96 °F (35 6 °C), temperature source Tympanic, resp  rate 18, height 5' 5" (1 651 m), weight 92 kg (202 lb 13 2 oz), SpO2 95 %  ,Body mass index is 33 75 kg/m²  CMP:   Results from last 7 days  Lab Units 10/29/17  0522  10/22/17  2212   SODIUM mmol/L 140  < > 144   POTASSIUM mmol/L 3 4*  < > 3 8   CHLORIDE mmol/L 99*  < > 103   CO2 mmol/L 36*  < > 34*   ANION GAP mmol/L 5  < > 7   BUN mg/dL 33*  < > 16   CREATININE mg/dL 0 92  < > 0 85   GLUCOSE RANDOM mg/dL 123  < > 103   CALCIUM mg/dL 10 1  < > 9 2   AST U/L  --   --  20   ALT U/L  --   --  25   ALK PHOS U/L  --   --  77   TOTAL PROTEIN g/dL  --   --  7 0   ALBUMIN g/dL  --   --  3 6   BILIRUBIN TOTAL mg/dL  --   --  0 59   EGFR ml/min/1 73sq m 56  < > 61   < > = values in this interval not displayed  Physical exam:  Lungs-decreased breath sounds throughout without wheezing, rhonchi or rales  Heart-irregular with well controlled rate  Grade 2 systolic murmur at the base  No diastolic murmur rub or gallop  Abdomen-nontender without mass organomegaly  Extremities-1 to 2+ bilateral pitting edema of the lower extremities without palpable foot pulses          Assessment:  1  Acute on chronic diastolic heart failure  Weight very near baseline  Has edema but doubt we will get rid of all this  Agree with current dosage of furosemide 80 mg daily with current potassium supplementation as ordered  2  Chronic atrial fibrillation with known sick sinus syndrome and pacemaker in situ  Rate well controlled without specific AV renan blocking drugs  Patient off Eliquis due to repetitive falls  Aspirin only  3    Mild aortic stenosis-should not be problematic for some time  4  History of hypertension with LVH on echo but not requiring specific blood pressure medications  5   Hyperlipidemia-on statin    Plan:   Okay to rehab on current medical regimen      Discharge medications should be  Furosemide 80 mg daily (increased dosage)  Spironolactone 25 mg daily (new)  Potassium 20 mEq daily-will reduce starting tomorrow since she would be at risk for hyperkalemia with concurrent spironolactone therapy  Aspirin 81 mg daily  Atorvastatin 20 mg daily    [facility] and can see them for management of her heart failure post hospital   Also sees Dr Ana Shine (PCP)  If the family wishes to follow with [facility] we can arrange follow-up in our office 1-2 weeks post discharge from cardiac rehab  Will see here elliott Woodard MD

## 2017-10-29 NOTE — PLAN OF CARE
Potential for Falls     Patient will remain free of falls Progressing        Prexisting or High Potential for Compromised Skin Integrity     Skin integrity is maintained or improved Progressing        SAFETY ADULT     Maintain or return to baseline ADL function Progressing     Maintain or return mobility status to optimal level Progressing

## 2017-10-29 NOTE — PROGRESS NOTES
[facility] [facility] Internal Medicine Progress Note  Patient: Luther Funk 80 y o  female   MRN: [id number]  PCP: OH Avery  Unit/Bed#: E2 -01 Encounter: [id number]  Date Of Visit: 10/29/17    Assessment:    Principal Problem:    CHF (congestive heart failure) ([facility] [address] )  Active Problems:    Hyperlipidemia    Coronary artery disease    A-fib (HCC)    Knee contusion      Plan:    · CHF from acute on chronic chronic diastolic which has improved with a initial course of IV furosemide and metolazone supplementation and probably as close to usually me a as we can get without entering alkalosis  Had added Aldactone  per Cardiology latest BN P 954 from 1200 had good response to metolazone  but has borderline hypokalemia this morning again probably but on way up with present potassium supplementation would not change now on 80 mg p o  Lasix  If has significant weight loss as outpatient can certainly take supplemental metolazone    Presently appears euvolemic at a weight of 202 await disposition for rehab    · Hypertension with moderate left ventricular hypertrophy on recent echocardiogram stable without meds  · Nonobstructive coronary artery disease continue statin  · Chronic atrial fibrillation but given fall history and wide amount of ecchymosis and hematoma in her left leg would favor discontinuation of present dosage of Eliquis  will place on DVT prophylaxis otherwise continue aspirin  · Chest x-ray showed in single view possible right lower lobe opacity however CTA of chest showed no infiltrate or PE  · Patellar hematoma per Orthopedics management will be expectant with compression and ice packs patient refused aspiration  and should be self limited continue to monitor      VTE Pharmacologic Prophylaxis:   Pharmacologic: Pharmacologic VTE Prophylaxis contraindicated due to Left knee hematoma bleeding at site of injection  Mechanical VTE Prophylaxis in Place: No    Discussions with Specialists or Other Care Team Provider:  Yes with Orthopedics and Cardiology    Time Spent for Care: 30 minutes  More than 50% of total time spent on counseling and coordination of care as described above  Subjective:   Pleasant and in no acute distress she   He denies maegan shortness of breath but is still bothered by her left knee and weight-bearing and pain referred to the left knee cap but this is improved also she had a bowel movement yesterday appears stable for rehab assignment once available    Objective:     Vitals:   Temp (24hrs), Av 7 °F (35 9 °C), Min:96 °F (35 6 °C), Max:97 4 °F (36 3 °C)    HR:  [60-71] 70  Resp:  [16-18] 18  BP: (104-133)/(56-62) 104/62  SpO2:  [93 %-98 %] 95 %  Body mass index is 33 75 kg/m²  Input and Output Summary (last 24 hours):        Intake/Output Summary (Last 24 hours) at 10/29/17 4095  Last data filed at 10/29/17 0501   Gross per 24 hour   Intake              120 ml   Output              350 ml   Net             -230 ml       Physical Exam:     Physical Exam:   General appearance: alert, appears stated age and cooperative  Head: Normocephalic, without obvious abnormality, atraumatic  Lungs: rales bibasilar  Heart: irregularly irregular rhythm  Abdomen: soft, non-tender; bowel sounds normal; no masses,  no organomegaly  Back: negative  Extremities: extremities normal, atraumatic, no cyanosis or edema, venous stasis dermatitis noted and Has taught area of ecchymosis hematoma over left patella now more localized with areas of ecchymoses now clearing above and below left knee  Neurologic: Grossly normal      Additional Data:     Labs:      Results from last 7 days  Lab Units 10/23/17  0627 10/22/17  2212   WBC Thousand/uL 6 43 6 74   HEMOGLOBIN g/dL 12 1 12 7   HEMATOCRIT % 37 3 38 7   PLATELETS Thousands/uL 221 231   NEUTROS PCT %  --  60   LYMPHS PCT %  --  24   MONOS PCT %  --  10   EOS PCT %  --  5       Results from last 7 days  Lab Units 10/29/17  0522  10/22/17  2212   SODIUM mmol/L 140 < > 144   POTASSIUM mmol/L 3 4*  < > 3 8   CHLORIDE mmol/L 99*  < > 103   CO2 mmol/L 36*  < > 34*   BUN mg/dL 33*  < > 16   CREATININE mg/dL 0 92  < > 0 85   CALCIUM mg/dL 10 1  < > 9 2   TOTAL PROTEIN g/dL  --   --  7 0   BILIRUBIN TOTAL mg/dL  --   --  0 59   ALK PHOS U/L  --   --  77   ALT U/L  --   --  25   AST U/L  --   --  20   GLUCOSE RANDOM mg/dL 123  < > 103   < > = values in this interval not displayed  Results from last 7 days  Lab Units 10/22/17  2212   INR  1 08       * I Have Reviewed All Lab Data Listed Above  * Additional Pertinent Lab Tests Reviewed: [address] Admission Reviewed    Imaging:  X-ray Chest 2 Views    Result Date: 10/23/2017  Narrative: CHEST INDICATION:  Altered mental status  Shortness of breath  History of CHF  COMPARISON:  December 11, 2012 VIEWS:  Frontal and lateral projections IMAGES:  2 FINDINGS:  Permanent pacemaker appears stable from prior  Heart shadow appears unremarkable  Atherosclerotic vascular calcifications are noted  On the frontal view the patient is somewhat rotated, but there is a focal opacity in the right mid to lower lung field near the heart border which appears new from the previous examination  It is not visible as a discrete finding on the lateral projection  It has smooth borders with roughly triangular shape  This probably should be reassessed on follow-up with repeat dual-energy chest radiograph suggested, preferably with improved positioning  The left lung is clear  There is no pleural fluid or pneumothorax  Visualized osseous structures appear within normal limits for the patient's age  Impression: New focal opacity in the right mid to lower lung field only visible on the frontal view  Etiology indeterminate  Possibly atelectasis or focal infiltrate  Mass not entirely excluded   ##[email or web address]##sigslh Workstation performed: [id number]     Xr Knee 1 Or 2 Vw Left    Result Date: 10/23/2017  Narrative: LEFT KNEE INDICATION:  Left knee injury, fall last night  COMPARISON: 1/9/2013  VIEWS:  AP and lateral IMAGES:  3 FINDINGS: Total knee arthroplasty again identified, with the functioning components in satisfactory alignment  There is no evidence of acute fracture or arthroplasty loosening  There is no joint effusion  No lytic or blastic lesions are seen  There is prepatellar soft tissue swelling in the anterior knee, which might represent bursitis  Impression: No acute osseous abnormality status post total knee arthroplasty  Soft tissue swelling in the anterior knee, raising the question of posttraumatic prepatellar bursitis  Workstation performed: [id number]     [facility] Ed Chest Pe Study    Result Date: 10/23/2017  Narrative: CTA - CHEST WITH IV CONTRAST - PULMONARY ANGIOGRAM INDICATION: Shortness of breath  COMPARISON: None  TECHNIQUE: CTA examination of the chest was performed using angiographic technique according to a protocol specifically tailored to evaluate for pulmonary embolism  Reformatted images were created in axial, sagittal, and coronal planes  In addition, coronal 3D MIP postprocessing was performed on the acquisition scanner  Radiation dose length product (DLP) for this visit:  463 mGy-cm   This examination, like all CT scans performed in the [facility], was performed utilizing techniques to minimize radiation dose exposure, including the use of iterative reconstruction and automated exposure control  IV Contrast:  85 mL of iohexol (OMNIPAQUE)      FINDINGS: PULMONARY ARTERIAL TREE:  No pulmonary embolus is seen  LUNGS:  Lungs are clear  There is no tracheal or endobronchial lesion  PLEURA:  Unremarkable  HEART/AORTA:  Unremarkable for patient's age  MEDIASTINUM AND JESUS:  Unremarkable  CHEST WALL AND LOWER NECK:       Left chest wall pacemaker is present  VISUALIZED STRUCTURES IN THE UPPER ABDOMEN:  Unremarkable  OSSEOUS STRUCTURES:  No acute fracture or destructive osseous lesion  Impression: No evidence of pulmonary embolus  Workstation performed: [id number]     Imaging Reports Reviewed Today Include:  Chest x-ray reviewed and CT imaging  Imaging Personally Reviewed by Myself Includes:    Procedure: X-ray Chest 2 Views    Result Date: 10/23/2017  Narrative: CHEST INDICATION:  Altered mental status  Shortness of breath  History of CHF  COMPARISON:  December 11, 2012 VIEWS:  Frontal and lateral projections IMAGES:  2 FINDINGS:  Permanent pacemaker appears stable from prior  Heart shadow appears unremarkable  Atherosclerotic vascular calcifications are noted  On the frontal view the patient is somewhat rotated, but there is a focal opacity in the right mid to lower lung field near the heart border which appears new from the previous examination  It is not visible as a discrete finding on the lateral projection  It has smooth borders with roughly triangular shape  This probably should be reassessed on follow-up with repeat dual-energy chest radiograph suggested, preferably with improved positioning  The left lung is clear  There is no pleural fluid or pneumothorax  Visualized osseous structures appear within normal limits for the patient's age  Impression: New focal opacity in the right mid to lower lung field only visible on the frontal view  Etiology indeterminate  Possibly atelectasis or focal infiltrate  Mass not entirely excluded  ##sigslh##sigslh Workstation performed: [id number]     Procedure: Xr Knee 1 Or 2 Vw Left    Result Date: 10/23/2017  Narrative: LEFT KNEE INDICATION:  Left knee injury, fall last night  COMPARISON: 1/9/2013  VIEWS:  AP and lateral IMAGES:  3 FINDINGS: Total knee arthroplasty again identified, with the functioning components in satisfactory alignment  There is no evidence of acute fracture or arthroplasty loosening  There is no joint effusion  No lytic or blastic lesions are seen   There is prepatellar soft tissue swelling in the anterior knee, which might represent bursitis  Impression: No acute osseous abnormality status post total knee arthroplasty  Soft tissue swelling in the anterior knee, raising the question of posttraumatic prepatellar bursitis  Workstation performed: [id number]     Procedure: Cta Ed Chest Pe Study    Result Date: 10/23/2017  Narrative: CTA - CHEST WITH IV CONTRAST - PULMONARY ANGIOGRAM INDICATION: Shortness of breath  COMPARISON: None  TECHNIQUE: CTA examination of the chest was performed using angiographic technique according to a protocol specifically tailored to evaluate for pulmonary embolism  Reformatted images were created in axial, sagittal, and coronal planes  In addition, coronal 3D MIP postprocessing was performed on the acquisition scanner  Radiation dose length product (DLP) for this visit:  463 mGy-cm   This examination, like all CT scans performed in the [facility], was performed utilizing techniques to minimize radiation dose exposure, including the use of iterative reconstruction and automated exposure control  IV Contrast:  85 mL of iohexol (OMNIPAQUE)      FINDINGS: PULMONARY ARTERIAL TREE:  No pulmonary embolus is seen  LUNGS:  Lungs are clear  There is no tracheal or endobronchial lesion  PLEURA:  Unremarkable  HEART/AORTA:  Unremarkable for patient's age  MEDIASTINUM AND JESUS:  Unremarkable  CHEST WALL AND LOWER NECK:       Left chest wall pacemaker is present  VISUALIZED STRUCTURES IN THE UPPER ABDOMEN:  Unremarkable  OSSEOUS STRUCTURES:  No acute fracture or destructive osseous lesion  Impression: No evidence of pulmonary embolus         Workstation performed: [id number]        Recent Cultures (last 7 days):           Last 24 Hours Medication List:     aspirin 81 mg Oral Daily   atorvastatin 20 mg Oral Daily   calcium carbonate 625 mg Oral BID With Meals   cholecalciferol 5,000 Units Oral Daily   furosemide 40 mg Intravenous Once   furosemide 80 mg Oral Daily Mirabegron ER 50 mg Oral Daily   potassium chloride 20 mEq Oral TID With Meals   senna-docusate sodium 1 tablet Oral Daily   spironolactone 25 mg Oral Daily        Today, Patient Was Seen By: Ignacio Copeland MD    ** Please Note: Dragon 360 Dictation voice to text software may have been used in the creation of this document   **

## 2017-10-30 ENCOUNTER — APPOINTMENT (INPATIENT)
Dept: NON INVASIVE DIAGNOSTICS | Facility: HOSPITAL | Age: 82
DRG: 292 | End: 2017-10-30
Payer: COMMERCIAL

## 2017-10-30 PROBLEM — I51.7 LEFT VENTRICULAR HYPERTROPHY: Status: ACTIVE | Noted: 2017-06-08

## 2017-10-30 PROBLEM — Z95.0 PACEMAKER: Status: ACTIVE | Noted: 2017-04-28

## 2017-10-30 PROBLEM — F03.90 DEMENTIA (HCC): Status: ACTIVE | Noted: 2017-10-30

## 2017-10-30 PROBLEM — R60.0 BILATERAL LOWER EXTREMITY EDEMA: Status: ACTIVE | Noted: 2017-10-30

## 2017-10-30 PROBLEM — I50.33 ACUTE ON CHRONIC DIASTOLIC CONGESTIVE HEART FAILURE (HCC): Status: ACTIVE | Noted: 2017-10-23

## 2017-10-30 LAB
ANION GAP SERPL CALCULATED.3IONS-SCNC: 4 MMOL/L (ref 4–13)
BUN SERPL-MCNC: 33 MG/DL (ref 5–25)
CALCIUM SERPL-MCNC: 9.9 MG/DL (ref 8.3–10.1)
CHLORIDE SERPL-SCNC: 100 MMOL/L (ref 100–108)
CO2 SERPL-SCNC: 36 MMOL/L (ref 21–32)
CREAT SERPL-MCNC: 0.94 MG/DL (ref 0.6–1.3)
GFR SERPL CREATININE-BSD FRML MDRD: 54 ML/MIN/1.73SQ M
GLUCOSE SERPL-MCNC: 112 MG/DL (ref 65–140)
POTASSIUM SERPL-SCNC: 3.6 MMOL/L (ref 3.5–5.3)
SODIUM SERPL-SCNC: 140 MMOL/L (ref 136–145)

## 2017-10-30 PROCEDURE — 97530 THERAPEUTIC ACTIVITIES: CPT

## 2017-10-30 PROCEDURE — 97535 SELF CARE MNGMENT TRAINING: CPT

## 2017-10-30 PROCEDURE — 97116 GAIT TRAINING THERAPY: CPT

## 2017-10-30 PROCEDURE — 80048 BASIC METABOLIC PNL TOTAL CA: CPT | Performed by: INTERNAL MEDICINE

## 2017-10-30 PROCEDURE — 93970 EXTREMITY STUDY: CPT

## 2017-10-30 PROCEDURE — 97110 THERAPEUTIC EXERCISES: CPT

## 2017-10-30 RX ADMIN — CALCIUM CARBONATE 625 MG: 1250 SUSPENSION ORAL at 16:16

## 2017-10-30 RX ADMIN — FUROSEMIDE 80 MG: 40 TABLET ORAL at 08:40

## 2017-10-30 RX ADMIN — POTASSIUM CHLORIDE 20 MEQ: 1500 TABLET, EXTENDED RELEASE ORAL at 08:40

## 2017-10-30 RX ADMIN — ENOXAPARIN SODIUM 40 MG: 40 INJECTION SUBCUTANEOUS at 13:15

## 2017-10-30 RX ADMIN — SPIRONOLACTONE 25 MG: 25 TABLET, FILM COATED ORAL at 08:39

## 2017-10-30 RX ADMIN — Medication 1 TABLET: at 08:40

## 2017-10-30 RX ADMIN — VITAMIN D, TAB 1000IU (100/BT) 5000 UNITS: 25 TAB at 08:39

## 2017-10-30 RX ADMIN — CALCIUM CARBONATE 625 MG: 1250 SUSPENSION ORAL at 08:39

## 2017-10-30 RX ADMIN — ASPIRIN 81 MG: 81 TABLET, COATED ORAL at 08:40

## 2017-10-30 RX ADMIN — ATORVASTATIN CALCIUM 20 MG: 20 TABLET, FILM COATED ORAL at 08:40

## 2017-10-30 NOTE — PHYSICAL THERAPY NOTE
Physical Therapy Treatment Note       10/30/17 1285   Pain Assessment   Pain Assessment No/denies pain   Pain Score No Pain   Restrictions/Precautions   Other Precautions Chair Alarm; Bed Alarm;O2;Fall Risk;Cognitive   General   Chart Reviewed Yes   Response to Previous Treatment Patient with no complaints from previous session  Family/Caregiver Present No   Cognition   Overall Cognitive Status Impaired   Arousal/Participation Alert; Cooperative   Attention Difficulty attending to directions   Orientation Level Oriented to person   Memory Decreased short term memory;Decreased recall of recent events;Decreased recall of precautions   Following Commands Follows one step commands with increased time or repetition   Subjective   Subjective " I'm drowsy I just woke up  My nose is stuffed "    Bed Mobility   Supine to Sit 5  Supervision   Additional items HOB elevated; Increased time required;Assist x 1;Verbal cues   Transfers   Sit to Stand 4  Minimal assistance   Additional items Assist x 1; Increased time required;Verbal cues   Stand to Sit 4  Minimal assistance   Additional items Assist x 1; Armrests; Increased time required;Verbal cues   Ambulation/Elevation   Gait pattern Forward Flexion; Excessively slow   Gait Assistance 4  Minimal assist   Additional items Assist x 1;Verbal cues   Assistive Device Rolling walker   Distance 100'x1,    Balance   Static Sitting Good   Dynamic Sitting Fair +   Static Standing Fair   Dynamic Standing Poor +   Ambulatory Poor +   Endurance Deficit   Endurance Deficit Description (fatigue, )   Activity Tolerance   Activity Tolerance Patient limited by fatigue   Nurse Made Aware Elizabeth PELLETIER   Exercises   Heelslides Sitting;20 reps;AROM; Bilateral   Hip Flexion Sitting;20 reps;AROM; Bilateral   Hip Abduction Sitting;20 reps;AROM; Bilateral   Hip Adduction Sitting;20 reps;AROM; Bilateral  (isometric hip add with pillow  )   Knee AROM Long Arc Quad Sitting;20 reps;AROM; Bilateral   Ankle Pumps Sitting;20 reps;AROM; Bilateral   Assessment   Prognosis Good   Problem List Decreased endurance; Impaired balance;Decreased mobility; Decreased safety awareness;Decreased strength;Decreased cognition; Impaired judgement;Obesity   Assessment Pt requires min assist for transfers and ambulation due to decreased balance, activity tolerance and poor safety awareness/ judgement  Pt  requires verbal cues for safe mobility and transfer techniques, safe approach to chair with use of rw and frequent verbal cues for improved posture  Noted mild davis requiring standing or seated rest breaks  Pt  Progressed with ambulation distances to 100'x1  PT continues to be at increased risk for falls due to decreaed cognition, safety, balance, activity tolerance and mobility  PT tolerated b/l le seated arom exercises well  Inpt rehab is recommended at d/c [facility] to maximize safe functional mobility and I    Goals   Patient Goals "To be able to breathe better "     [facility] Expiration Date 11/02/17   Treatment Day 4   Plan   Treatment/Interventions Functional transfer training;LE strengthening/ROM; Therapeutic exercise; Endurance training;Patient/family training;Equipment eval/education; Bed mobility;Gait training   Progress Progressing toward goals   PT Frequency 5x/wk   Recommendation   Recommendation Short-term skilled PT   PT - OK to Discharge Yes     Torsten Jacobson, PTA

## 2017-10-30 NOTE — PLAN OF CARE
Problem: OCCUPATIONAL THERAPY ADULT  Goal: Performs self-care activities at highest level of function for planned discharge setting  See evaluation for individualized goals  Treatment Interventions: ADL retraining, Functional transfer training, Endurance training, Cognitive reorientation, Patient/family training, Equipment evaluation/education, Compensatory technique education, Continued evaluation          See flowsheet documentation for full assessment, interventions and recommendations  Outcome: Progressing  Limitation: Decreased ADL status, Decreased UE strength, Decreased Safe judgement during ADL, Decreased cognition, Decreased endurance, Decreased high-level ADLs  Prognosis: Fair  Assessment: Pt was seen for skilled OT with focus on completion of self care routine, selt toileting and review of RW safety  see above levels of A required for all functional tasks  Pt will benefit from further rehab with focus on achieving optimal performance levels with all functional tasks        OT Discharge Recommendation: Short Term Rehab         Comments: BIN Chaudhary

## 2017-10-30 NOTE — PROGRESS NOTES
Progress Note - Kirti Schaefer 80 y o  female MRN: [id number]    Unit/Bed#: E2 -01 Encounter: [id number]        * Acute on chronic diastolic congestive heart failure (HCC)   Assessment & Plan    · Improved, weight has been stable  · Patient not on O2 at home and will attempt to wean off as tolerated for O2 > 92%  · Continue Lasix 80 mg PO daily  · Monitor I's and O's  · For rehab tomorrow if remains stable        Bilateral lower extremity edema   Assessment & Plan    · Most likely due to CHF  · Due to marked tenderness and slight asymmetry will obtain VD of b/l LE        Dementia   Assessment & Plan    · Most likely age-related alzheimer's - outpatient evaluation and f/u  · At baseline mental status - confused to place and date but improves with re-orienting  · Daughter [employer] is POA        A-fib ([address] )   Assessment & Plan    · Rate controlled with Pacemaker in situ in setting of Hx of sick sinus syndrome  · Not on AC due to recurrent falls  · Continue ASA        Coronary artery disease   Assessment & Plan    · Continue ASA and statin            Pharmacologic: Enoxaparin (Lovenox)  Mechanical VTE Prophylaxis in Place: No    Patient Centered Rounds: I have performed bedside rounds with nursing staff today  Discussions with Specialists or Other Care Team Provider: Reviewed Cardiology documentation    Education and Discussions with Family / Patient: Daughter    Time Spent for Care: 30 minutes  More than 50% of total time spent on counseling and coordination of care as described above  Current Length of Stay: 7 day(s)    Current Patient Status: Inpatient   Certification Statement: The patient will continue to require additional inpatient hospital stay due to further workup, close monitoring    Discharge Plan / Estimated Discharge Date: 1 day      Code Status: Level 3 - DNAR and DNI      Subjective:   Patient seen and examined  She reports feeling better, states she still becomes DIAZ   She appears comfortable in her chair  She ate her entire breakfast  Denies chest pain or palpitations, denies abdominal pain, N/V/D/C  Reports b/l calf tenderness L>R  Objective:     Vitals:   Temp (24hrs), Av 5 °F (35 8 °C), Min:96 °F (35 6 °C), Max:96 9 °F (36 1 °C)    HR:  [66-75] 66  Resp:  [18] 18  BP: (114-152)/(55-72) 152/66  SpO2:  [92 %-96 %] 96 %  Body mass index is 33 86 kg/m²  Input and Output Summary (last 24 hours):     No intake or output data in the 24 hours ending 10/30/17 1201    Physical Exam:     Physical Exam   Constitutional: She appears well-developed  HENT:   Mouth/Throat: Oropharynx is clear and moist    Neck: No JVD present  Cardiovascular: An irregularly irregular rhythm present  Murmur heard  Systolic murmur is present with a grade of 2/6   Pulmonary/Chest: Effort normal  No respiratory distress  She has rales  Abdominal: Soft  She exhibits no distension  There is no tenderness  Musculoskeletal: She exhibits edema  +1 LE edema L>R   Neurological: She is alert  No cranial nerve deficit  Oriented x 1   Skin: Skin is warm and dry         Additional Data:     Labs:          Results from last 7 days  Lab Units 10/30/17  0426   SODIUM mmol/L 140   POTASSIUM mmol/L 3 6   CHLORIDE mmol/L 100   CO2 mmol/L 36*   BUN mg/dL 33*   CREATININE mg/dL 0 94   CALCIUM mg/dL 9 9   GLUCOSE RANDOM mg/dL 112             Recent Cultures (last 7 days):           Last 24 Hours Medication List:     aspirin 81 mg Oral Daily   atorvastatin 20 mg Oral Daily   calcium carbonate 625 mg Oral BID With Meals   cholecalciferol 5,000 Units Oral Daily   furosemide 40 mg Intravenous Once   furosemide 80 mg Oral Daily   Mirabegron ER 50 mg Oral Daily   potassium chloride 20 mEq Oral Daily   senna-docusate sodium 1 tablet Oral Daily   spironolactone 25 mg Oral Daily        Today, Patient Was Seen By: Ilana Temple MD

## 2017-10-30 NOTE — PLAN OF CARE
Problem: PHYSICAL THERAPY ADULT  Goal: Performs mobility at highest level of function for planned discharge setting  See evaluation for individualized goals  Treatment/Interventions: Functional transfer training, LE strengthening/ROM, Therapeutic exercise, Endurance training, Patient/family training, Bed mobility, Gait training, Spoke to nursing, Family          See flowsheet documentation for full assessment, interventions and recommendations  Outcome: Progressing  Prognosis: Good  Problem List: Decreased endurance, Impaired balance, Decreased mobility, Decreased safety awareness, Decreased strength, Decreased cognition, Impaired judgement, Obesity  Assessment: Pt requires min assist for transfers and ambulation due to decreased balance, activity tolerance and poor safety awareness/ judgement  Pt  requires verbal cues for safe mobility and transfer techniques, safe approach to chair with use of rw and frequent verbal cues for improved posture  Noted mild davis requiring standing or seated rest breaks  Pt  Progressed with ambulation distances to 100'x1  PT continues to be at increased risk for falls due to decreaed cognition, safety, balance, activity tolerance and mobility  PT tolerated b/l le seated arom exercises well  Inpt rehab is recommended at d/c [facility] to maximize safe functional mobility and I   Barriers to Discharge: None     Recommendation: Short-term skilled PT     PT - OK to Discharge: Yes    See flowsheet documentation for full assessment

## 2017-10-30 NOTE — ASSESSMENT & PLAN NOTE
· Rate controlled with Pacemaker in situ in setting of Hx of sick sinus syndrome  · Not on AC due to recurrent falls  · Continue ASA

## 2017-10-30 NOTE — CASE MANAGEMENT
Continued Stay Review    Date:   10/30/2017    Vital Signs: /66   Pulse 66   Temp (!) 96 °F (35 6 °C) (Tympanic)   Resp 18   Ht 5' 5" (1 651 m)   Wt 92 3 kg (203 lb 8 oz)   SpO2 96%   BMI 33 86 kg/m²     Medications:   Scheduled Meds:   aspirin 81 mg Oral Daily   atorvastatin 20 mg Oral Daily   calcium carbonate 625 mg Oral BID With Meals   cholecalciferol 5,000 Units Oral Daily   enoxaparin 40 mg Subcutaneous Q24H LUKAS   furosemide 40 mg Intravenous Once   furosemide 80 mg Oral Daily   Mirabegron ER 50 mg Oral Daily   potassium chloride 20 mEq Oral Daily   senna-docusate sodium 1 tablet Oral Daily   spironolactone 25 mg Oral Daily     Continuous Infusions:    PRN Meds:   aluminum-magnesium hydroxide-simethicone    ondansetron    Abnormal Labs/Diagnostic Results:    BUN  33  Co2    36    Age/Sex: 80 y o  female     · Assessment/Plan:    CHF from acute on chronic chronic diastolic which has improved with a initial course of IV furosemide and metolazone supplementation and probably as close to usually me a as we can get without entering alkalosis  Had added Aldactone  per Cardiology latest BN P 954 from 1200 had good response to metolazone  but has borderline hypokalemia this morning again probably but on way up with present potassium supplementation would not change now on 80 mg p o  Lasix  If has significant weight loss as outpatient can certainly take supplemental metolazone    Presently appears euvolemic at a weight of 202 await disposition for rehab     · Hypertension with moderate left ventricular hypertrophy on recent echocardiogram stable without meds  · Nonobstructive coronary artery disease continue statin  · Chronic atrial fibrillation but given fall history and wide amount of ecchymosis and hematoma in her left leg would favor discontinuation of present dosage of Eliquis  will place on DVT prophylaxis otherwise continue aspirin  · Chest x-ray showed in single view possible right lower lobe opacity however CTA of chest showed no infiltrate or PE  · Patellar hematoma per Orthopedics management will be expectant with compression and ice packs patient refused aspiration  and should be self limited continue to monitor       Discharge Plan:    [facility] in the [employer] by Clinton Flores for 2017  Network Utilization Review Department  Phone: [phone]; Fax [phone]  ATTENTION: The Network Utilization Review Department is now centralized for our 7 Facilities  Make a note that we have a new phone and fax numbers for our Department  Please call with any questions or concerns to [phone] and carefully follow the prompts so that you are directed to the right person  All voicemails are confidential  Fax any determinations, approvals, denials, and requests for initial or continue stay review clinical to [phone]  Due to HIGH CALL volume, it would be easier if you could please send faxed requests to expedite your requests and in part, help us provide discharge notifications faster

## 2017-10-30 NOTE — ASSESSMENT & PLAN NOTE
· Improved, weight has been stable  · Patient not on O2 at home and will attempt to wean off as tolerated for O2 > 92%  · Continue Lasix 80 mg PO daily  · Monitor I's and O's  · For rehab tomorrow if remains stable

## 2017-10-30 NOTE — ASSESSMENT & PLAN NOTE
· Most likely age-related alzheimer's - outpatient evaluation and f/u  · At baseline mental status - confused to place and date but improves with re-orienting  · Daughter Verna Corea is POA

## 2017-10-31 LAB
ANION GAP SERPL CALCULATED.3IONS-SCNC: 3 MMOL/L (ref 4–13)
BASOPHILS # BLD AUTO: 0.09 THOUSANDS/ΜL (ref 0–0.1)
BASOPHILS NFR BLD AUTO: 1 % (ref 0–1)
BUN SERPL-MCNC: 30 MG/DL (ref 5–25)
CALCIUM SERPL-MCNC: 9.7 MG/DL (ref 8.3–10.1)
CHLORIDE SERPL-SCNC: 102 MMOL/L (ref 100–108)
CO2 SERPL-SCNC: 37 MMOL/L (ref 21–32)
CREAT SERPL-MCNC: 0.93 MG/DL (ref 0.6–1.3)
EOSINOPHIL # BLD AUTO: 0.36 THOUSAND/ΜL (ref 0–0.61)
EOSINOPHIL NFR BLD AUTO: 6 % (ref 0–6)
ERYTHROCYTE [DISTWIDTH] IN BLOOD BY AUTOMATED COUNT: 13.6 % (ref 11.6–15.1)
GFR SERPL CREATININE-BSD FRML MDRD: 55 ML/MIN/1.73SQ M
GLUCOSE SERPL-MCNC: 117 MG/DL (ref 65–140)
HCT VFR BLD AUTO: 39.6 % (ref 34.8–46.1)
HGB BLD-MCNC: 12.9 G/DL (ref 11.5–15.4)
LYMPHOCYTES # BLD AUTO: 1.71 THOUSANDS/ΜL (ref 0.6–4.47)
LYMPHOCYTES NFR BLD AUTO: 26 % (ref 14–44)
MCH RBC QN AUTO: 30.1 PG (ref 26.8–34.3)
MCHC RBC AUTO-ENTMCNC: 32.6 G/DL (ref 31.4–37.4)
MCV RBC AUTO: 92 FL (ref 82–98)
MONOCYTES # BLD AUTO: 0.6 THOUSAND/ΜL (ref 0.17–1.22)
MONOCYTES NFR BLD AUTO: 9 % (ref 4–12)
NEUTROPHILS # BLD AUTO: 3.73 THOUSANDS/ΜL (ref 1.85–7.62)
NEUTS SEG NFR BLD AUTO: 58 % (ref 43–75)
NRBC BLD AUTO-RTO: 0 /100 WBCS
PLATELET # BLD AUTO: 230 THOUSANDS/UL (ref 149–390)
PMV BLD AUTO: 11.1 FL (ref 8.9–12.7)
POTASSIUM SERPL-SCNC: 3.6 MMOL/L (ref 3.5–5.3)
RBC # BLD AUTO: 4.29 MILLION/UL (ref 3.81–5.12)
SODIUM SERPL-SCNC: 142 MMOL/L (ref 136–145)
WBC # BLD AUTO: 6.49 THOUSAND/UL (ref 4.31–10.16)

## 2017-10-31 PROCEDURE — 80048 BASIC METABOLIC PNL TOTAL CA: CPT | Performed by: FAMILY MEDICINE

## 2017-10-31 PROCEDURE — 85025 COMPLETE CBC W/AUTO DIFF WBC: CPT | Performed by: FAMILY MEDICINE

## 2017-10-31 RX ORDER — ACETAMINOPHEN 325 MG/1
650 TABLET ORAL EVERY 6 HOURS PRN
Status: DISCONTINUED | OUTPATIENT
Start: 2017-10-31 | End: 2017-11-01 | Stop reason: HOSPADM

## 2017-10-31 RX ORDER — POLYETHYLENE GLYCOL 3350 17 G/17G
17 POWDER, FOR SOLUTION ORAL DAILY
Status: DISCONTINUED | OUTPATIENT
Start: 2017-10-31 | End: 2017-11-01 | Stop reason: HOSPADM

## 2017-10-31 RX ORDER — FUROSEMIDE 40 MG/1
80 TABLET ORAL DAILY
Status: DISCONTINUED | OUTPATIENT
Start: 2017-10-31 | End: 2017-11-01 | Stop reason: HOSPADM

## 2017-10-31 RX ADMIN — Medication 1 TABLET: at 09:43

## 2017-10-31 RX ADMIN — POTASSIUM CHLORIDE 20 MEQ: 1500 TABLET, EXTENDED RELEASE ORAL at 09:43

## 2017-10-31 RX ADMIN — CALCIUM CARBONATE 625 MG: 1250 SUSPENSION ORAL at 07:33

## 2017-10-31 RX ADMIN — BISACODYL 5 MG: 5 TABLET, COATED ORAL at 09:43

## 2017-10-31 RX ADMIN — ENOXAPARIN SODIUM 40 MG: 40 INJECTION SUBCUTANEOUS at 09:59

## 2017-10-31 RX ADMIN — CALCIUM CARBONATE 625 MG: 1250 SUSPENSION ORAL at 15:31

## 2017-10-31 RX ADMIN — SPIRONOLACTONE 25 MG: 25 TABLET, FILM COATED ORAL at 09:43

## 2017-10-31 RX ADMIN — FUROSEMIDE 80 MG: 40 TABLET ORAL at 09:43

## 2017-10-31 RX ADMIN — ASPIRIN 81 MG: 81 TABLET, COATED ORAL at 09:43

## 2017-10-31 RX ADMIN — POLYETHYLENE GLYCOL 3350 17 G: 17 POWDER, FOR SOLUTION ORAL at 09:43

## 2017-10-31 RX ADMIN — VITAMIN D, TAB 1000IU (100/BT) 5000 UNITS: 25 TAB at 09:43

## 2017-10-31 RX ADMIN — ATORVASTATIN CALCIUM 20 MG: 20 TABLET, FILM COATED ORAL at 09:43

## 2017-10-31 NOTE — PROGRESS NOTES
Progress Note - Corazon Patel 80 y o  female MRN: [id number]    Unit/Bed#: E2 -01 Encounter: [id number]        * Acute on chronic diastolic congestive heart failure (HCC)   Assessment & Plan    · Stable with weight slightly increased today  · Patient is weaned off suppl O2 with good O2 sat on room air  · Continue Lasix 80 mg PO daily  · Monitor I's and O's  · For rehab tomorrow if remains stable        Bilateral lower extremity edema   Assessment & Plan    · Due to CHF as well as recent LLE trauma with left knee hematoma  · VDE b/l LE negaitve for DVT        Dementia   Assessment & Plan    · Most likely age-related alzheimer's - outpatient evaluation and f/u  · At baseline mental status - confused to place and date but improves with re-orienting  · Daughter Jie Cannon is POA  · For short term SNF upon discharge        A-fib ([facility] )   Assessment & Plan    · Rate controlled with Pacemaker in situ in setting of Hx of sick sinus syndrome  · Not on AC due to recurrent falls/recent left knee hematoma  · Continue ASA        Coronary artery disease   Assessment & Plan    · Continue ASA and statin        Hyperlipidemia   Assessment & Plan    · Continue Lipitor        Knee contusion   Assessment & Plan    · Secondary to fall  · Slowly improving  · Add Tylenol PRN mild pain          Pharmacologic: Enoxaparin (Lovenox)  Mechanical VTE Prophylaxis in Place: No    Patient Centered Rounds: I have performed bedside rounds with nursing staff today  Discussions with Specialists or Other Care Team Provider: Case Management    Education and Discussions with Family / Patient: Daughter    Time Spent for Care: 30 minutes  More than 50% of total time spent on counseling and coordination of care as described above      Current Length of Stay: 8 day(s)    Current Patient Status: Inpatient   Certification Statement: The patient will continue to require additional inpatient hospital stay due to needs inpatient rehab placement    Discharge Plan / Estimated Discharge Date: tomorrow if remains stable      Code Status: Level 3 - DNAR and DNI      Subjective:   Patient is seen and examined  She reports feeling well  Reports mild pain and soreness of left knee radiating down left calf  She reports good appetite, denies abdominal pain, nausea or vomiting  Denies chest pain, SOB or palpitations  Objective:     Vitals:   Temp (24hrs), Av 7 °F (36 5 °C), Min:97 5 °F (36 4 °C), Max:98 °F (36 7 °C)    HR:  [66-68] 66  Resp:  [18] 18  BP: (130-136)/(79-88) 134/88  SpO2:  [92 %-98 %] 98 %  Body mass index is 34 12 kg/m²  Input and Output Summary (last 24 hours): Intake/Output Summary (Last 24 hours) at 10/31/17 1810  Last data filed at 10/31/17 1400   Gross per 24 hour   Intake              520 ml   Output              425 ml   Net               95 ml       Physical Exam:     Physical Exam   Constitutional: She appears well-nourished  HENT:   Mouth/Throat: Oropharynx is clear and moist    Eyes: Conjunctivae are normal    Neck: No JVD present  Cardiovascular: Normal rate  Exam reveals no gallop  No murmur heard  Pulmonary/Chest: Effort normal  No respiratory distress  She has no wheezes  She has no rales  Abdominal: Soft  She exhibits no distension  There is no tenderness  Musculoskeletal: She exhibits edema and tenderness  B/l LE edema, trace  Left calf tenderness   Neurological: She is alert  Skin: Skin is warm and dry     Left knee hematoma extending over entire anterior patella       Additional Data:     Labs:      Results from last 7 days  Lab Units 10/31/17  0456   WBC Thousand/uL 6 49   HEMOGLOBIN g/dL 12 9   HEMATOCRIT % 39 6   PLATELETS Thousands/uL 230   NEUTROS PCT % 58   LYMPHS PCT % 26   MONOS PCT % 9   EOS PCT % 6       Results from last 7 days  Lab Units 10/31/17  0456   SODIUM mmol/L 142   POTASSIUM mmol/L 3 6   CHLORIDE mmol/L 102   CO2 mmol/L 37*   BUN mg/dL 30*   CREATININE mg/dL 0 93 CALCIUM mg/dL 9 7   GLUCOSE RANDOM mg/dL 117             Recent Cultures (last 7 days):           Last 24 Hours Medication List:     aspirin 81 mg Oral Daily   atorvastatin 20 mg Oral Daily   calcium carbonate 625 mg Oral BID With Meals   cholecalciferol 5,000 Units Oral Daily   enoxaparin 40 mg Subcutaneous Q24H LUKAS   furosemide 80 mg Oral Daily   Mirabegron ER 50 mg Oral Daily   polyethylene glycol 17 g Oral Daily   potassium chloride 20 mEq Oral Daily   senna-docusate sodium 1 tablet Oral Daily   spironolactone 25 mg Oral Daily        Today, Patient Was Seen By: Travon López MD

## 2017-10-31 NOTE — ASSESSMENT & PLAN NOTE
· Rate controlled with Pacemaker in situ in setting of Hx of sick sinus syndrome  · Not on AC due to recurrent falls/recent left knee hematoma  · Continue ASA

## 2017-10-31 NOTE — SOCIAL WORK
CM informed patient was accepted by [facility] for today; CM called patient's insurance and spoke with Arian Treadwell to start authorization; all clinicals faxed to [facility]  Patient's daughter aware of same  Daughter Dick is interested in transporting patient to [facility] when authorization is obtained  No other needs at present  CM to follow as needed

## 2017-10-31 NOTE — ASSESSMENT & PLAN NOTE
· Most likely age-related alzheimer's - outpatient evaluation and f/u  · At baseline mental status - confused to place and date but improves with re-orienting  · Daughter Reece Root is POA  · For short term SNF upon discharge

## 2017-10-31 NOTE — SOCIAL WORK
Cm received call from [address] [place] [address] at [facility] that there has not been a determination yet on approval for [facility], however, medical director is requeastingf a [id number] before determination is made  CM called patient's attending and informed of same, attending Dr Maosn Roca agreeable top [id number]  Cm called Jammie back and provided [address] [place] [address] with contact information for Dr Mason Roca  CM informed by [address] [place] [address] that insurance however feels that patient would be approrpaite for a SNF  CM received call from Dr Tanvir Chavarria that [id number] was completed, unfortunately, patient was denied for [facility]  CM called patient's daughter Sujata Quintero and made aware of same  Daughter would like to pursue [facility] as second choice; CM contacted [facility] and was informed facility still has a bed for patient  CM received call from [address] [place] [address] at [facility] who informed CM that patient was denied  CM informed [address] [place] [address] of authorization request to [address] informed CM that she would need to submit to medical director and will get back to CM with determination  CM made Son aware  No other needs at present  CM following

## 2017-10-31 NOTE — ASSESSMENT & PLAN NOTE
· Stable with weight slightly increased today  · Patient is weaned off suppl O2 with good O2 sat on room air  · Continue Lasix 80 mg PO daily  · Monitor I's and O's  · For rehab tomorrow if remains stable

## 2017-11-01 VITALS
TEMPERATURE: 98 F | BODY MASS INDEX: 34.12 KG/M2 | OXYGEN SATURATION: 98 % | DIASTOLIC BLOOD PRESSURE: 58 MMHG | HEART RATE: 72 BPM | HEIGHT: 65 IN | RESPIRATION RATE: 18 BRPM | SYSTOLIC BLOOD PRESSURE: 113 MMHG | WEIGHT: 204.81 LBS

## 2017-11-01 LAB
ANION GAP SERPL CALCULATED.3IONS-SCNC: 9 MMOL/L (ref 4–13)
BASOPHILS # BLD AUTO: 0.07 THOUSANDS/ΜL (ref 0–0.1)
BASOPHILS NFR BLD AUTO: 1 % (ref 0–1)
BUN SERPL-MCNC: 26 MG/DL (ref 5–25)
CALCIUM SERPL-MCNC: 9.6 MG/DL (ref 8.3–10.1)
CHLORIDE SERPL-SCNC: 102 MMOL/L (ref 100–108)
CO2 SERPL-SCNC: 33 MMOL/L (ref 21–32)
CREAT SERPL-MCNC: 0.83 MG/DL (ref 0.6–1.3)
EOSINOPHIL # BLD AUTO: 0.31 THOUSAND/ΜL (ref 0–0.61)
EOSINOPHIL NFR BLD AUTO: 4 % (ref 0–6)
ERYTHROCYTE [DISTWIDTH] IN BLOOD BY AUTOMATED COUNT: 13.6 % (ref 11.6–15.1)
GFR SERPL CREATININE-BSD FRML MDRD: 63 ML/MIN/1.73SQ M
GLUCOSE SERPL-MCNC: 104 MG/DL (ref 65–140)
HCT VFR BLD AUTO: 42 % (ref 34.8–46.1)
HGB BLD-MCNC: 13.3 G/DL (ref 11.5–15.4)
LYMPHOCYTES # BLD AUTO: 1.48 THOUSANDS/ΜL (ref 0.6–4.47)
LYMPHOCYTES NFR BLD AUTO: 21 % (ref 14–44)
MCH RBC QN AUTO: 29.3 PG (ref 26.8–34.3)
MCHC RBC AUTO-ENTMCNC: 31.7 G/DL (ref 31.4–37.4)
MCV RBC AUTO: 93 FL (ref 82–98)
MONOCYTES # BLD AUTO: 0.85 THOUSAND/ΜL (ref 0.17–1.22)
MONOCYTES NFR BLD AUTO: 12 % (ref 4–12)
NEUTROPHILS # BLD AUTO: 4.3 THOUSANDS/ΜL (ref 1.85–7.62)
NEUTS SEG NFR BLD AUTO: 62 % (ref 43–75)
PLATELET # BLD AUTO: 266 THOUSANDS/UL (ref 149–390)
PMV BLD AUTO: 11.7 FL (ref 8.9–12.7)
POTASSIUM SERPL-SCNC: 3.8 MMOL/L (ref 3.5–5.3)
RBC # BLD AUTO: 4.54 MILLION/UL (ref 3.81–5.12)
SODIUM SERPL-SCNC: 144 MMOL/L (ref 136–145)
WBC # BLD AUTO: 7.01 THOUSAND/UL (ref 4.31–10.16)

## 2017-11-01 PROCEDURE — 97116 GAIT TRAINING THERAPY: CPT

## 2017-11-01 PROCEDURE — 85025 COMPLETE CBC W/AUTO DIFF WBC: CPT | Performed by: FAMILY MEDICINE

## 2017-11-01 PROCEDURE — 80048 BASIC METABOLIC PNL TOTAL CA: CPT | Performed by: FAMILY MEDICINE

## 2017-11-01 PROCEDURE — 97530 THERAPEUTIC ACTIVITIES: CPT

## 2017-11-01 PROCEDURE — 97110 THERAPEUTIC EXERCISES: CPT

## 2017-11-01 RX ORDER — SPIRONOLACTONE 25 MG/1
25 TABLET ORAL DAILY
Qty: 30 TABLET | Refills: 0 | Status: SHIPPED | OUTPATIENT
Start: 2017-11-02 | End: 2020-01-09 | Stop reason: HOSPADM

## 2017-11-01 RX ORDER — FUROSEMIDE 80 MG
80 TABLET ORAL DAILY
Qty: 30 TABLET | Refills: 0 | Status: SHIPPED | OUTPATIENT
Start: 2017-11-02 | End: 2020-01-09 | Stop reason: HOSPADM

## 2017-11-01 RX ORDER — MAGNESIUM HYDROXIDE/ALUMINUM HYDROXICE/SIMETHICONE 120; 1200; 1200 MG/30ML; MG/30ML; MG/30ML
30 SUSPENSION ORAL EVERY 6 HOURS PRN
Qty: 355 ML | Refills: 0 | Status: SHIPPED | OUTPATIENT
Start: 2017-11-01 | End: 2018-01-07

## 2017-11-01 RX ORDER — POTASSIUM CHLORIDE 20 MEQ/1
20 TABLET, EXTENDED RELEASE ORAL DAILY
Qty: 30 TABLET | Refills: 0 | Status: SHIPPED | OUTPATIENT
Start: 2017-11-02

## 2017-11-01 RX ADMIN — FUROSEMIDE 80 MG: 40 TABLET ORAL at 09:42

## 2017-11-01 RX ADMIN — ENOXAPARIN SODIUM 40 MG: 40 INJECTION SUBCUTANEOUS at 09:42

## 2017-11-01 RX ADMIN — CALCIUM CARBONATE 625 MG: 1250 SUSPENSION ORAL at 09:38

## 2017-11-01 RX ADMIN — ATORVASTATIN CALCIUM 20 MG: 20 TABLET, FILM COATED ORAL at 09:42

## 2017-11-01 RX ADMIN — POLYETHYLENE GLYCOL 3350 17 G: 17 POWDER, FOR SOLUTION ORAL at 09:36

## 2017-11-01 RX ADMIN — VITAMIN D, TAB 1000IU (100/BT) 5000 UNITS: 25 TAB at 09:41

## 2017-11-01 RX ADMIN — SPIRONOLACTONE 25 MG: 25 TABLET, FILM COATED ORAL at 09:42

## 2017-11-01 RX ADMIN — ASPIRIN 81 MG: 81 TABLET, COATED ORAL at 09:42

## 2017-11-01 RX ADMIN — Medication 1 TABLET: at 09:41

## 2017-11-01 RX ADMIN — POTASSIUM CHLORIDE 20 MEQ: 1500 TABLET, EXTENDED RELEASE ORAL at 09:42

## 2017-11-01 NOTE — DISCHARGE SUMMARY
Discharge Summary - [facility] Internal Medicine    Patient Information: Trena Erickson 80 y o  female MRN: [id number]  Unit/Bed#: E2 -01 Encounter: [id number]    Discharging Physician / Practitioner: Oswald Dumont MD  PCP: Ke Rios  Admission Date: 10/22/2017  Discharge Date: 11/01/17    Reason for Admission: CHF exacerbation    Discharge Diagnoses:     Principal Problem:    Acute on chronic diastolic congestive heart failure ([facility] )  Active Problems:    Hyperlipidemia    Coronary artery disease    A-fib ([facility] )    Dementia    Bilateral lower extremity edema    Knee contusion  Resolved Problems:    * No resolved hospital problems  *      Consultations During Hospital Stay:  · Cardiology, Physical therapy    Procedures Performed:     · CTA chest PE study  · Venous doppler b/l LE  · Chest xray  · Left knee xray    Significant Findings / Test Results:     · Left knee xray: soft tissues swelling in anterior knee; no acute osseous abdnormality s/p total knee arthroplasty    Incidental Findings:   · None     Test Results Pending at Discharge (will require follow up): · None     Outpatient Tests Requested:  · Periodic BMP    Complications:  None    Hospital Course: Trena Erickson is a 80 y o  female patient who originally presented to the hospital on 10/22/2017 due to dyspnea at rest, found to be in diastolic CHF exacerbation requiring IV diuretics  The patient responded appropriately to treatment with subsequent weight loss and subjective improvement in her dyspnea  The patient was initially placed on supplemental oxygen, however, was able to be weaned off of NC with good oxygen saturation on room air at the time of her discharge  The patient's persistent atrial fibrillation was rate-controlled throughout her hospitalization  He oral intake was good  Her vital signs were stable      The patient was noted for a left knee hematoma and subsequent xray of the knee was suggestive of prepatellar bursitis  Orthopedic evaluation was done and supportive management with watchful observation was recommended  The patient was found to have lower extremity edema and bilateral calf tenderness  A bilateral lower extremity venous duplex was obtained and negative for DVT  The findings were therefore attributed to patient's chronic CHF as well as her left knee trauma  Her pain was controlled with Tylenol  At the time of discharge, the patient was reporting feeling well, her weight was stable, her blood work was unremarkable, her vital signs and oxygen saturation on room air were at goal and she had good oral intake  Given patient's hx of demential, her mental status was at her baseline throughout her hospitalization with orientation to self and occasional orientation to place and time when re-oriented  The patient was discharged to [facility] rehab for further evaluation and treatment  Her home medications were restarted  Condition at Discharge: stable     Discharge Day Visit / Exam:     Subjective:  Patient reports feeling well, has no complaints  Denies dyspnea, chest pain or palpitations  Denies abdominal pain, nausea, or constipation  Vitals: Blood Pressure: 113/58 (11/01/17 0703)  Pulse: 72 (11/01/17 0703)  Temperature: 98 °F (36 7 °C) (11/01/17 0703)  Temp Source: Tympanic (11/01/17 0703)  Respirations: 18 (11/01/17 0703)  Height: 5' 5" (165 1 cm) (10/27/17 0536)  Weight - Scale: 92 9 kg (204 lb 12 9 oz) (11/01/17 0600)  SpO2: 98 % (11/01/17 0703)  Exam:     Physical Exam   Constitutional: She appears well-developed  HENT:   Head: Normocephalic  Mouth/Throat: Oropharynx is clear and moist    Neck: No JVD present  Cardiovascular: Normal rate and regular rhythm  Pulmonary/Chest: Effort normal  No respiratory distress  She has no wheezes  Abdominal: Soft  She exhibits no distension  There is no tenderness  Musculoskeletal: She exhibits edema     Left knee anterior patellar hematoma  B/l calf tenderness  Trace b/l LE edema   Neurological: She is alert  Skin: Skin is warm and dry  Discussion with Family: Daughter    Discharge instructions/Information to patient and family:   See after visit summary for information provided to patient and family  Provisions for Follow-Up Care:  See after visit summary for information related to follow-up care and any pertinent home health orders  Disposition:     Sandra Cid at [facility] SNF:   · Not Applicable to this Patient - Not Applicable to this Patient    Planned Readmission: No     Discharge Statement:  I spent 35 minutes discharging the patient  This time was spent on the day of discharge  I had direct contact with the patient on the day of discharge  Greater than 50% of the total time was spent examining patient, answering all patient questions, arranging and discussing plan of care with patient as well as directly providing post-discharge instructions  Additional time then spent on discharge activities  Discharge Medications:  See after visit summary for reconciled discharge medications provided to patient and family        ** Please Note: This note has been constructed using a voice recognition system **

## 2017-11-01 NOTE — PLAN OF CARE
Problem: PHYSICAL THERAPY ADULT  Goal: Performs mobility at highest level of function for planned discharge setting  See evaluation for individualized goals  Treatment/Interventions: Functional transfer training, LE strengthening/ROM, Therapeutic exercise, Endurance training, Patient/family training, Bed mobility, Gait training, Spoke to nursing, Family          See flowsheet documentation for full assessment, interventions and recommendations  Outcome: Progressing  Prognosis: Good  Problem List: Decreased strength, Decreased endurance, Impaired balance, Decreased mobility, Impaired judgement, Decreased safety awareness, Decreased cognition, Obesity  Assessment: Pt requires min assist for transfers and ambulation due to decreased balance, activity tolerance and poor safety awareness/ judgement  Pt  requires verbal cues for safe mobility and transfer techniques, safe approach to toilet with use of rw and frequent verbal cues for improved posture  Noted mild davis requiring standing or seated rest breaks  Pt 's Sao2 98% on RA with exertion  PT continues to be at increased risk for falls due to decreaed cognition, safety, balance, activity tolerance and mobility  PT tolerated b/l le seated arom exercises well  Inpt rehab is recommended at d/c inorder to maximize safe functional mobility and I   Barriers to Discharge: None     Recommendation: Short-term skilled PT     PT - OK to Discharge: Yes    See flowsheet documentation for full assessment

## 2017-11-01 NOTE — SOCIAL WORK
Auth received from [facility] # [id number]; patient approved for a Rug level  mins; approved for an initial 3 days with update due on 11/3/17  Facility and daughter Bertah Camejo made aware  [facility] per daughter  CM explained IMM letter to daughter on telephone  No other needs at present  CM following as needed

## 2017-11-01 NOTE — PLAN OF CARE
Discharge to home or other facility with appropriate resources Adequate for Discharge      Pt will be dc'd to [facility] when daughter picks her up

## 2017-11-01 NOTE — PLAN OF CARE
Discharge to home or other facility with appropriate resources Progressing      Daughter will be picking up pt at 1300 to transport to [place]

## 2017-11-01 NOTE — PHYSICAL THERAPY NOTE
Physical Therapy Treatment Note     11/01/17 1031   Pain Assessment   Pain Assessment No/denies pain   Restrictions/Precautions   LLE Weight Bearing Per Order WBAT   Other Precautions Chair Alarm; Bed Alarm;Cognitive; Fall Risk;Fluid restriction   General   Chart Reviewed Yes   Response to Previous Treatment Patient with no complaints from previous session  Cognition   Overall Cognitive Status Impaired   Arousal/Participation Alert; Cooperative   Attention Attends with cues to redirect   Orientation Level Oriented to person   Memory Decreased recall of precautions;Decreased recall of recent events;Decreased short term memory   Following Commands Follows one step commands with increased time or repetition   Subjective   Subjective " I must go to the bathroom first "     Bed Mobility   Supine to Sit 5  Supervision   Additional items HOB elevated;Assist x 1; Increased time required   Sit to Supine 4  Minimal assistance   Additional items Assist x 1; Increased time required;LE management   Transfers   Sit to Stand 4  Minimal assistance   Additional items Assist x 1; Increased time required;Verbal cues   Stand to Sit 4  Minimal assistance   Additional items Assist x 1;Verbal cues; Increased time required   Toilet transfer 4  Minimal assistance   Additional items Assist x 1; Increased time required;Standard toilet;Verbal cues  (grab bar)   Ambulation/Elevation   Gait pattern Forward Flexion; Excessively slow  (PT rests on elbows, cues to  walker with both hands)   Gait Assistance 4  Minimal assist   Additional items Assist x 1;Verbal cues   Assistive Device Rolling walker   Distance 60'x1, 40'x1   Balance   Static Sitting Good   Dynamic Sitting Fair +   Static Standing Fair   Dynamic Standing Fair -   Ambulatory Fair -   Endurance Deficit   Endurance Deficit Description (fatigue, DIAZ, SAo2 98% on RA post ambulation  )   Activity Tolerance   Activity Tolerance Patient limited by fatigue   Nurse Made Aware Roseann PELLETIER Exercises   Quad Sets 20 reps;AROM; Bilateral;Supine   Heelslides 20 reps;AROM; Bilateral;Supine   Hip Abduction Supine;20 reps;AROM; Bilateral   Hip Adduction Supine;20 reps;AROM; Bilateral   Knee AROM Short Arc Quad Supine;20 reps;AROM; Bilateral   Ankle Pumps Supine;20 reps;AROM; Bilateral   Assessment   Prognosis Good   Problem List Decreased strength;Decreased endurance; Impaired balance;Decreased mobility; Impaired judgement;Decreased safety awareness;Decreased cognition;Obesity   Assessment Pt requires min assist for transfers and ambulation due to decreased balance, activity tolerance and poor safety awareness/ judgement  Pt  requires verbal cues for safe mobility and transfer techniques, safe approach to toilet with use of rw and frequent verbal cues for improved posture  Noted mild davis requiring standing or seated rest breaks  Pt 's Sao2 98% on RA with exertion  PT continues to be at increased risk for falls due to decreaed cognition, safety, balance, activity tolerance and mobility  PT tolerated b/l le seated arom exercises well  Inpt rehab is recommended at d/c [facility] to maximize safe functional mobility and I    Goals   Patient Goals none stated   STG Expiration Date 11/02/17   Treatment Day 5   Plan   Treatment/Interventions Functional transfer training;LE strengthening/ROM; Therapeutic exercise; Endurance training;Cognitive reorientation;Patient/family training;Equipment eval/education; Bed mobility;Gait training;Spoke to nursing   Progress Progressing toward goals   PT Frequency 5x/wk   Recommendation   Recommendation Short-term skilled PT   PT - OK to Discharge Yes         Jah Brunson PTA

## 2017-11-03 ENCOUNTER — GENERIC CONVERSION - ENCOUNTER (OUTPATIENT)
Dept: OTHER | Facility: OTHER | Age: 82
End: 2017-11-03

## 2017-11-22 ENCOUNTER — GENERIC CONVERSION - ENCOUNTER (OUTPATIENT)
Dept: OTHER | Facility: OTHER | Age: 82
End: 2017-11-22

## 2018-01-07 ENCOUNTER — HOSPITAL ENCOUNTER (EMERGENCY)
Facility: HOSPITAL | Age: 83
Discharge: HOME | End: 2018-01-07
Attending: EMERGENCY MEDICINE | Admitting: EMERGENCY MEDICINE
Payer: COMMERCIAL

## 2018-01-07 ENCOUNTER — APPOINTMENT (EMERGENCY)
Dept: CT IMAGING | Facility: HOSPITAL | Age: 83
End: 2018-01-07
Payer: COMMERCIAL

## 2018-01-07 ENCOUNTER — APPOINTMENT (EMERGENCY)
Dept: RADIOLOGY | Facility: HOSPITAL | Age: 83
End: 2018-01-07
Payer: COMMERCIAL

## 2018-01-07 VITALS
RESPIRATION RATE: 18 BRPM | SYSTOLIC BLOOD PRESSURE: 133 MMHG | DIASTOLIC BLOOD PRESSURE: 69 MMHG | BODY MASS INDEX: 34.28 KG/M2 | HEART RATE: 73 BPM | OXYGEN SATURATION: 98 % | TEMPERATURE: 97.7 F | WEIGHT: 206 LBS

## 2018-01-07 DIAGNOSIS — S09.90XA INJURY OF HEAD, INITIAL ENCOUNTER: ICD-10-CM
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
## 2018-01-07 DIAGNOSIS — S40.011A CONTUSION OF RIGHT SHOULDER, INITIAL ENCOUNTER: ICD-10-CM

## 2018-01-07 LAB
ALBUMIN SERPL BCP-MCNC: 3.7 G/DL (ref 3.5–5)
ALP SERPL-CCNC: 63 U/L (ref 46–116)
ALT SERPL W P-5'-P-CCNC: 24 U/L (ref 12–78)
ANION GAP SERPL CALCULATED.3IONS-SCNC: 8 MMOL/L (ref 4–13)
APTT PPP: 30 SECONDS (ref 23–35)
AST SERPL W P-5'-P-CCNC: 18 U/L (ref 5–45)
BASOPHILS # BLD AUTO: 0.04 THOUSANDS/ΜL (ref 0–0.1)
BASOPHILS NFR BLD AUTO: 1 % (ref 0–1)
BILIRUB SERPL-MCNC: 0.63 MG/DL (ref 0.2–1)
BUN SERPL-MCNC: 20 MG/DL (ref 5–25)
CALCIUM SERPL-MCNC: 9.7 MG/DL (ref 8.3–10.1)
CHLORIDE SERPL-SCNC: 103 MMOL/L (ref 100–108)
CO2 SERPL-SCNC: 31 MMOL/L (ref 21–32)
CREAT SERPL-MCNC: 0.98 MG/DL (ref 0.6–1.3)
EOSINOPHIL # BLD AUTO: 0.3 THOUSAND/ΜL (ref 0–0.61)
EOSINOPHIL NFR BLD AUTO: 5 % (ref 0–6)
ERYTHROCYTE [DISTWIDTH] IN BLOOD BY AUTOMATED COUNT: 16.2 % (ref 11.6–15.1)
GFR SERPL CREATININE-BSD FRML MDRD: 52 ML/MIN/1.73SQ M
GLUCOSE SERPL-MCNC: 113 MG/DL (ref 65–140)
HCT VFR BLD AUTO: 40.8 % (ref 34.8–46.1)
HGB BLD-MCNC: 13.9 G/DL (ref 11.5–15.4)
INR PPP: 1.18 (ref 0.86–1.16)
LYMPHOCYTES # BLD AUTO: 1.23 THOUSANDS/ΜL (ref 0.6–4.47)
LYMPHOCYTES NFR BLD AUTO: 19 % (ref 14–44)
MCH RBC QN AUTO: 31.2 PG (ref 26.8–34.3)
MCHC RBC AUTO-ENTMCNC: 34.1 G/DL (ref 31.4–37.4)
MCV RBC AUTO: 92 FL (ref 82–98)
MONOCYTES # BLD AUTO: 0.5 THOUSAND/ΜL (ref 0.17–1.22)
MONOCYTES NFR BLD AUTO: 8 % (ref 4–12)
NEUTROPHILS # BLD AUTO: 4.26 THOUSANDS/ΜL (ref 1.85–7.62)
NEUTS SEG NFR BLD AUTO: 67 % (ref 43–75)
NRBC BLD AUTO-RTO: 0 /100 WBCS
PLATELET # BLD AUTO: 198 THOUSANDS/UL (ref 149–390)
PMV BLD AUTO: 10.7 FL (ref 8.9–12.7)
POTASSIUM SERPL-SCNC: 4.2 MMOL/L (ref 3.5–5.3)
PROT SERPL-MCNC: 6.9 G/DL (ref 6.4–8.2)
PROTHROMBIN TIME: 15.1 SECONDS (ref 12.1–14.4)
RBC # BLD AUTO: 4.46 MILLION/UL (ref 3.81–5.12)
SODIUM SERPL-SCNC: 142 MMOL/L (ref 136–145)
SPECIMEN SOURCE: NORMAL
TROPONIN I BLD-MCNC: 0.02 NG/ML (ref 0–0.08)
WBC # BLD AUTO: 6.33 THOUSAND/UL (ref 4.31–10.16)

## 2018-01-07 PROCEDURE — 71046 X-RAY EXAM CHEST 2 VIEWS: CPT

## 2018-01-07 PROCEDURE — 73030 X-RAY EXAM OF SHOULDER: CPT

## 2018-01-07 PROCEDURE — 72170 X-RAY EXAM OF PELVIS: CPT

## 2018-01-07 PROCEDURE — 72125 CT NECK SPINE W/O DYE: CPT

## 2018-01-07 PROCEDURE — 36415 COLL VENOUS BLD VENIPUNCTURE: CPT | Performed by: EMERGENCY MEDICINE

## 2018-01-07 PROCEDURE — 99284 EMERGENCY DEPT VISIT MOD MDM: CPT

## 2018-01-07 PROCEDURE — 93005 ELECTROCARDIOGRAM TRACING: CPT

## 2018-01-07 PROCEDURE — 80053 COMPREHEN METABOLIC PANEL: CPT | Performed by: EMERGENCY MEDICINE

## 2018-01-07 PROCEDURE — 85610 PROTHROMBIN TIME: CPT | Performed by: EMERGENCY MEDICINE

## 2018-01-07 PROCEDURE — 84484 ASSAY OF TROPONIN QUANT: CPT

## 2018-01-07 PROCEDURE — 85730 THROMBOPLASTIN TIME PARTIAL: CPT | Performed by: EMERGENCY MEDICINE

## 2018-01-07 PROCEDURE — 70450 CT HEAD/BRAIN W/O DYE: CPT

## 2018-01-07 PROCEDURE — 85025 COMPLETE CBC W/AUTO DIFF WBC: CPT | Performed by: EMERGENCY MEDICINE

## 2018-01-07 RX ORDER — MINERAL OIL 100 G/100G
1 OIL RECTAL AS NEEDED
COMMUNITY

## 2018-01-07 RX ORDER — ACETAMINOPHEN 500 MG
TABLET ORAL
Status: ON HOLD | COMMUNITY
Start: 2017-12-19 | End: 2020-11-07

## 2018-01-07 NOTE — DISCHARGE INSTRUCTIONS
Contusion in Adults   WHAT YOU NEED TO KNOW:   A contusion is a bruise that appears on your skin after an injury  A bruise happens when small blood vessels tear but skin does not  When blood vessels tear, blood leaks into nearby tissue, such as soft tissue or muscle  DISCHARGE INSTRUCTIONS:   Return to the emergency department if:   · You have new trouble moving the injured area  · You have tingling or numbness in or near the injured area  · Your hand or foot below the bruise gets cold or turns pale  Contact your healthcare provider if:   · You find a new lump in the injured area  · Your symptoms do not improve with treatment after 4 to 5 days  · You have questions or concerns about your condition or care  Medicines: You may need any of the following:  · NSAIDs  help decrease swelling and pain or fever  This medicine is available with or without a doctor's order  NSAIDs can cause stomach bleeding or kidney problems in certain people  If you take blood thinner medicine, always ask your healthcare provider if NSAIDs are safe for you  Always read the medicine label and follow directions  · Prescription pain medicine  may be given  Do not wait until the pain is severe before you take your medicine  · Take your medicine as directed  Contact your healthcare provider if you think your medicine is not helping or if you have side effects  Tell him of her if you are allergic to any medicine  Keep a list of the medicines, vitamins, and herbs you take  Include the amounts, and when and why you take them  Bring the list or the pill bottles to follow-up visits  Carry your medicine list with you in case of an emergency  Follow up with your healthcare provider as directed: You may need to return within a week to check your injury again  Write down your questions so you remember to ask them during your visits    Help a contusion heal:   · Rest the injured area  or use it less than usual  If you bruised your leg or foot, you may need crutches or a cane to help you walk  This will help you keep weight off your injured body part  · Apply ice  to decrease swelling and pain  Ice may also help prevent tissue damage  Use an ice pack, or put crushed ice in a plastic bag  Cover it with a towel and place it on your bruise for 15 to 20 minutes every hour or as directed  · Use compression  to support the area and decrease swelling  Wrap an elastic bandage around the area over the bruised muscle  Make sure the bandage is not too tight  You should be able to fit 1 finger between the bandage and your skin  · Elevate (raise) your injured body part  above the level of your heart to help decrease pain and swelling  Use pillows, blankets, or rolled towels to elevate the area as often as you can  · Do not drink alcohol  as directed  Alcohol may slow healing  · Do not stretch injured muscles  right after your injury  Ask your healthcare provider when and how you may safely stretch after your injury  Gentle stretches can help increase your flexibility  · Do not massage the area or put heating pads  on the bruise right after your injury  Heat and massage may slow healing  Your healthcare provider may tell you to apply heat after several days  At that time, heat will start to help the injury heal   Prevent another contusion:   · Stretch and warm up before you play sports or exercise  · Wear protective gear when you play sports  Examples are shin guards and padding  · If you begin a new physical activity, start slowly to give your body a chance to adjust   © 2017 [address] [employer] Information is for End User's use only and may not be sold, redistributed or otherwise used for commercial purposes  All illustrations and images included in CareNotes® are the copyrighted property of [employer]  or [employer]  The above information is an  only   It is not intended as medical advice for individual conditions or treatments  Talk to your doctor, nurse or pharmacist before following any medical regimen to see if it is safe and effective for you  Head Injury   WHAT YOU NEED TO KNOW:   A head injury is most often caused by a blow to the head  This may occur from a fall, bicycle injury, sports injury, being struck in the head, or a motor vehicle accident  DISCHARGE INSTRUCTIONS:   Call 911 or have someone else call for any of the following:   · You cannot be woken  · You have a seizure  · You stop responding to others or you faint  · You have blurry or double vision  · Your speech becomes slurred or confused  · You have arm or leg weakness, loss of feeling, or new problems with coordination  · Your pupils are larger than usual or one pupil is a different size than the other  · You have blood or clear fluid coming out of your ears or nose  Return to the emergency department if:   · You have repeated or forceful vomiting  · You feel confused  · Your headache gets worse or becomes severe  · You or someone caring for you notices that you are harder to wake than usual   Contact your healthcare provider if:   · Your symptoms last longer than 6 weeks after the injury  · You have questions or concerns about your condition or care  Medicines:   · Acetaminophen  decreases pain  Acetaminophen is available without a doctor's order  Ask how much to take and how often to take it  Follow directions  Acetaminophen can cause liver damage if not taken correctly  · Take your medicine as directed  Contact your healthcare provider if you think your medicine is not helping or if you have side effects  Tell him or her if you are allergic to any medicine  Keep a list of the medicines, vitamins, and herbs you take  Include the amounts, and when and why you take them  Bring the list or the pill bottles to follow-up visits   Carry your medicine list with you in case of an emergency  Self-care:   · Rest  or do quiet activities for 24 to 48 hours  Limit your time watching TV, using the computer, or doing tasks that require a lot of thinking  Slowly return to your normal activities as directed  Do not play sports or do activities that may cause you to get hit in the head  Ask your healthcare provider when you can return to sports  · Apply ice  on your head for 15 to 20 minutes every hour or as directed  Use an ice pack, or put crushed ice in a plastic bag  Cover it with a towel before you apply it to your skin  Ice helps prevent tissue damage and decreases swelling and pain  · Have someone stay with you for 24 hours  or as directed  This person can monitor you for complications and call [phone]  When you are awake the person should ask you a few questions to see if you are thinking clearly  An example would be to ask your name or your address  Prevent another head injury:   · Wear a helmet that fits properly  Do this when you play sports, or ride a bike, scooter, or skateboard  Helmets help decrease your risk of a serious head injury  Talk to your healthcare provider about other ways you can protect yourself if you play sports  · Wear your seat belt every time you are in a car  This helps to decrease your risk for a head injury if you are in a car accident  Follow up with your healthcare provider as directed:  Write down your questions so you remember to ask them during your visits  © 2017 [address]  Information is for End User's use only and may not be sold, redistributed or otherwise used for commercial purposes  All illustrations and images included in CareNotes® are the copyrighted property of [employer]  or [address]  The above information is an  only  It is not intended as medical advice for individual conditions or treatments   Talk to your doctor, nurse or pharmacist before following any medical regimen to see if it is safe and effective for you

## 2018-01-07 NOTE — ED NOTES
Pt assisted to bedside commode pt was also min assist x 1 with walker , pt walked outside room to [place] and back to room  Steady  No c/o dizziness  Pt d/chome son at bedside        Pamela Appiah RN  01/07/18 1024

## 2018-01-07 NOTE — ED PROVIDER NOTES
History  Chief Complaint   Patient presents with    Fall     Pt and family reports [place] this [place]  Pt states she did hit her head and R shoulder  Denies LOC +thinners - Eliquis  Pt  Mao Arredondo from a standing position this morning and hit the R side of her head against the closet door or the floor  No LOC,  No headache but c/o soreness on the R side of her head  No n/v   Pt  Is on eliquis  Prior to Admission Medications   Prescriptions Last Dose Informant Patient Reported? Taking?    Mirabegron ER (MYRBETRIQ) 50 MG TB24   Yes Yes   Sig: Take 50 mg by mouth daily   Omega-3 Fatty Acids (FISH OIL) 1,000 mg   Yes Yes   Sig: Take 2,000 mg by mouth daily   Polyethylene Glycol 3350 (PEG 3350 PO)   Yes Yes   Sig: Take 17 g by mouth daily   acetaminophen (TYLENOL) 500 mg tablet   Yes Yes   Sig: TAKE 1 CAPLET ORALLY THREE TIMES DAILY FOR PAIN *NOT TO EXCEED 4GMS APAP/24HRS*   alendronate (FOSAMAX) 70 mg tablet   Yes Yes   Sig: Take 70 mg by mouth every 7 days   apixaban (ELIQUIS) 5 mg   Yes Yes   Sig: Take 5 mg by mouth 2 (two) times a day   atorvastatin (LIPITOR) 20 mg tablet   Yes Yes   Sig: Take 20 mg by mouth daily   calcium carbonate (OS-DAVE) 600 MG tablet   Yes Yes   Sig: Take 600 mg by mouth 2 (two) times a day with meals   cholecalciferol (VITAMIN D3) 1,000 units tablet   Yes Yes   Sig: Take 5,000 Units by mouth daily   dextromethorphan-guaifenesin (MUCINEX DM)  MG per 12 hr tablet   Yes Yes   Sig: Take 1 tablet by mouth 2 (two) times a day as needed for cough   furosemide (LASIX) 80 mg tablet   No Yes   Sig: Take 1 tablet by mouth daily   mineral oil enema   Yes Yes   Sig: Insert 1 enema into the rectum as needed for constipation   potassium chloride (K-DUR,KLOR-CON) 20 mEq tablet   No Yes   Sig: Take 1 tablet by mouth daily   senna-docusate sodium (SENOKOT S) 8 6-50 mg per tablet   Yes Yes   Sig: Take 1 tablet by mouth daily   spironolactone (ALDACTONE) 25 mg tablet   No Yes Sig: Take 1 tablet by mouth daily      Facility-Administered Medications: None       Past Medical History:   Diagnosis Date    Amnesia     Cardiac disease     CHF (congestive heart failure) (HCC)     Coronary artery disease     Dementia     Dementia     Disease of thyroid gland     Edema     Hearing loss     Hyperlipidemia     Nocturia     Osteoarthritis     Osteoporosis     Otosclerosis     Pacemaker     Renal disorder     Vitamin D deficiency        Past Surgical History:   Procedure Laterality Date    CARDIAC PACEMAKER PLACEMENT      JOINT REPLACEMENT         History reviewed  No pertinent family history  I have reviewed and agree with the history as documented  Social History   Substance Use Topics    Smoking status: Never Smoker    Smokeless tobacco: Never Used    Alcohol use 4 2 oz/week     7 Glasses of wine per week        Review of Systems   Constitutional: Negative for appetite change, fatigue and fever  HENT: Negative for rhinorrhea and sore throat  Respiratory: Negative for cough, shortness of breath and wheezing  Cardiovascular: Negative for chest pain and leg swelling  Gastrointestinal: Negative for abdominal pain, diarrhea and vomiting  Genitourinary: Negative for dysuria and flank pain  Musculoskeletal: Negative for back pain and neck pain  Skin: Negative for rash  Neurological: Positive for headaches  Negative for syncope     Psychiatric/Behavioral:        Mood normal       Physical Exam  ED Triage Vitals [01/07/18 0816]   Temperature Pulse Respirations Blood Pressure SpO2   97 7 °F (36 5 °C) 73 18 133/69 98 %      Temp Source Heart Rate Source Patient Position - Orthostatic VS BP Location FiO2 (%)   Temporal Monitor Sitting -- --      Pain Score       2           Orthostatic Vital Signs  Vitals:    01/07/18 0816   BP: 133/69   Pulse: 73   Patient Position - Orthostatic VS: Sitting       Physical Exam   Constitutional: She is oriented to person, place, and time  She appears well-developed and well-nourished  HENT:   Head: Normocephalic  Mouth/Throat: Oropharynx is clear and moist    R parietal tenderness   Eyes: Pupils are equal, round, and reactive to light  Neck: Normal range of motion  Neck supple  No cspine tenderness   Cardiovascular: Normal rate and regular rhythm  Pulmonary/Chest: Effort normal and breath sounds normal    Abdominal: Soft  There is no tenderness  Musculoskeletal:   All ext  FROM except for R shoulder +tenderness, decreased ROM secondary to pain, +n/v intact   Neurological: She is alert and oriented to person, place, and time  No cranial nerve deficit  Skin: Skin is warm and dry  Nursing note and vitals reviewed  ED Medications  Medications - No data to display    Diagnostic Studies  Results Reviewed     Procedure Component Value Units Date/Time    Comprehensive metabolic panel [[id number]] Collected:  01/07/18 0841    Lab Status:  Final result Specimen:  Blood from Arm, Right Updated:  01/07/18 0859     Sodium 142 mmol/L      Potassium 4 2 mmol/L      Chloride 103 mmol/L      CO2 31 mmol/L      Anion Gap 8 mmol/L      BUN 20 mg/dL      Creatinine 0 98 mg/dL      Glucose 113 mg/dL      Calcium 9 7 mg/dL      AST 18 U/L      ALT 24 U/L      Alkaline Phosphatase 63 U/L      Total Protein 6 9 g/dL      Albumin 3 7 g/dL      Total Bilirubin 0 63 mg/dL      eGFR 52 ml/min/1 73sq m     Narrative:         National Kidney Disease Education Program recommendations are as follows:  GFR calculation is accurate only with a steady state creatinine  Chronic Kidney disease less than 60 ml/min/1 73 sq  meters  Kidney failure less than 15 ml/min/1 73 sq  meters      POCT troponin [[id number]]  (Normal) Collected:  01/07/18 0844    Lab Status:  Final result Updated:  01/07/18 0857     POC Troponin I 0 02 ng/ml      Specimen Type VENOUS    Narrative:         [employer] i-Stat handheld analyzer 99% cutoff is > 0 08ng/mL in network Emergency Departments    o cTnI 99% cutoff is useful only when applied to patients in the clinical setting of myocardial ischemia  o cTnI 99% cutoff should be interpreted in the context of clinical history, ECG findings and possibly cardiac imaging to establish correct diagnosis  o cTnI 99% cutoff may be suggestive but clearly not indicative of a coronary event without the clinical setting of myocardial ischemia  Protime-INR [[id number]]  (Abnormal) Collected:  01/07/18 0841    Lab Status:  Final result Specimen:  Blood from Arm, Right Updated:  01/07/18 0856     Protime 15 1 (H) seconds      INR 1 18 (H)    APTT [[id number]]  (Normal) Collected:  01/07/18 0841    Lab Status:  Final result Specimen:  Blood from Arm, Right Updated:  01/07/18 0856     PTT 30 seconds     Narrative: Therapeutic Heparin Range = 60-90 seconds    CBC and differential [[id number]]  (Abnormal) Collected:  01/07/18 0841    Lab Status:  Final result Specimen:  Blood from Arm, Right Updated:  01/07/18 0847     WBC 6 33 Thousand/uL      RBC 4 46 Million/uL      Hemoglobin 13 9 g/dL      Hematocrit 40 8 %      MCV 92 fL      MCH 31 2 pg      MCHC 34 1 g/dL      RDW 16 2 (H) %      MPV 10 7 fL      Platelets 260 Thousands/uL      nRBC 0 /100 WBCs      Neutrophils Relative 67 %      Lymphocytes Relative 19 %      Monocytes Relative 8 %      Eosinophils Relative 5 %      Basophils Relative 1 %      Neutrophils Absolute 4 26 Thousands/µL      Lymphocytes Absolute 1 23 Thousands/µL      Monocytes Absolute 0 50 Thousand/µL      Eosinophils Absolute 0 30 Thousand/µL      Basophils Absolute 0 04 Thousands/µL                  CT cervical spine without contrast   Final Result by Dioni Gallego MD (01/07 1007)      No cervical spine fracture or traumatic malalignment  Moderate spondylosis                           Workstation performed: [id number]         CT head without contrast   Final Result by Davonte Ron MD (01/07 0935)      No acute intracranial abnormality  Chronic microangiopathic ischemic changes in the white matter  Atrophy  Workstation performed: [id number]         XR pelvis ap only 1 or 2 views   Final Result by Davonte Ron MD (01/07 0920)      Osteoarthritis  No fracture           Workstation performed: [id number]         XR chest 2 views   Final Result by Davonte Ron MD (01/07 0919)      Persistent density in the right lung field projecting slightly lower probably due to altered patient position  It is 22 x 25 mm  On a prior CT of the chest from 10/23/2017 this seemed to be a very low attenuation possibly fatty focus near the heart    border  Likely benign  Workstation performed: [id number]         XR shoulder 2+ views RIGHT   Final Result by Davonte Ron MD (01/07 6985)      Relatively severe arthritis as mentioned  No fracture or dislocation  Workstation performed: [id number]                    Procedures  Procedures       Phone Contacts  ED Phone Contact    ED Course  ED Course                                MDM  Number of Diagnoses or Management Options  Contusion of right shoulder, initial encounter:   Fall, initial encounter:   Injury of head, initial encounter:      Amount and/or Complexity of Data Reviewed  Clinical lab tests: ordered and reviewed  Tests in the radiology section of CPT®: ordered and reviewed    Risk of Complications, Morbidity, and/or Mortality  Presenting problems: moderate  General comments: Pt  Reading well and wanted to go home  Family agrees  She ambulated without difficulty      CritCare Time    Disposition  Final diagnoses:   Fall, initial encounter   Injury of head, initial encounter   Contusion of right shoulder, initial encounter     Time reflects when diagnosis was documented in both MDM as applicable and the Disposition within this note     Time User Action Codes Description Comment    1/7/2018 10:09 AM Saadia Valdovinos Began Add [D78  XXXA] Fall, initial encounter     1/7/2018 10:10 AM Loki Valdovinos Add [S09 90XA] Injury of head, initial encounter     1/7/2018 10:10 AM Loki Valdovinos Add [S40 011A] Contusion of right shoulder, initial encounter       ED Disposition     ED Disposition Condition Comment    Discharge  Kalina REY Jose discharge to home/self care      Condition at discharge: Stable        Follow-up Information     Follow up With Specialties Details Why [address] [facility] Nurse Practitioner   [address] [place], [place] [address] [address]  [phone]          Discharge Medication List as of 1/7/2018 10:10 AM      CONTINUE these medications which have NOT CHANGED    Details   acetaminophen (TYLENOL) 500 mg tablet TAKE 1 CAPLET ORALLY THREE TIMES DAILY FOR PAIN *NOT TO EXCEED 4GMS APAP/24HRS*, Historical Med      alendronate (FOSAMAX) 70 mg tablet Take 70 mg by mouth every 7 days, Historical Med      apixaban (ELIQUIS) 5 mg Take 5 mg by mouth 2 (two) times a day, Historical Med      atorvastatin (LIPITOR) 20 mg tablet Take 20 mg by mouth daily, Historical Med      calcium carbonate (OS-DAVE) 600 MG tablet Take 600 mg by mouth 2 (two) times a day with meals, Historical Med      cholecalciferol (VITAMIN D3) 1,000 units tablet Take 5,000 Units by mouth daily, Historical Med      dextromethorphan-guaifenesin (MUCINEX DM)  MG per 12 hr tablet Take 1 tablet by mouth 2 (two) times a day as needed for cough, Historical Med      furosemide (LASIX) 80 mg tablet Take 1 tablet by mouth daily, Starting Thu 11/2/2017, Print      mineral oil enema Insert 1 enema into the rectum as needed for constipation, Historical Med      Mirabegron ER (MYRBETRIQ) 50 MG TB24 Take 50 mg by mouth daily, Historical Med      Omega-3 Fatty Acids (FISH OIL) 1,000 mg Take 2,000 mg by mouth daily, Historical Med      Polyethylene Glycol 3350 (PEG 3350 PO) Take 17 g by mouth daily, Historical Med      potassium chloride (K-DUR,KLOR-CON) 20 mEq tablet Take 1 tablet by mouth daily, Starting Thu 11/2/2017, Print      senna-docusate sodium (SENOKOT S) 8 6-50 mg per tablet Take 1 tablet by mouth daily, Historical Med      spironolactone (ALDACTONE) 25 mg tablet Take 1 tablet by mouth daily, Starting Thu 11/2/2017, Print           No discharge procedures on file      ED Provider  Electronically Signed by           Linnette Cornelius MD  01/12/18 6899

## 2018-01-08 LAB
ATRIAL RATE: 267 BPM
QRS AXIS: -47 DEGREES
QRSD INTERVAL: 100 MS
QT INTERVAL: 430 MS
QTC INTERVAL: 425 MS
T WAVE AXIS: 25 DEGREES
VENTRICULAR RATE: 59 BPM

## 2018-01-12 NOTE — MISCELLANEOUS
History of Present Illness  Communication  There was no answer and no voicemail was available  A call was made to the patient/patient's family  The contact name and phone number is   Patient discharged from [facility] and [facility] s/p STR        Signatures   Electronically signed by : Chelsea Anderson, ; Nov 22 2017  1:44PM EST                       (Author)

## 2018-01-18 NOTE — MISCELLANEOUS
History of Present Illness    The patient is being contacted for follow-up after hospitalization  Hospitalization The hospitalization was not a readmission, The patient was discharged on 11/1/17  The facility name is: [facility]  The patient is on HF pathway  Treatment Questions:   HFCC Additional Notes:   Email to facility per protocol        Signatures   Electronically signed by : Jordi Tran, ; Nov  3 2017  2:44PM EST                       (Author)

## 2020-01-05 ENCOUNTER — APPOINTMENT (EMERGENCY)
Dept: RADIOLOGY | Facility: HOSPITAL | Age: 85
DRG: 292 | End: 2020-01-05
Payer: COMMERCIAL

## 2020-01-05 ENCOUNTER — HOSPITAL ENCOUNTER (INPATIENT)
Facility: HOSPITAL | Age: 85
LOS: 4 days | Discharge: NURSING, REHAB OR LONG-TERM CARE | DRG: 292 | End: 2020-01-09
Attending: EMERGENCY MEDICINE | Admitting: INTERNAL MEDICINE
Payer: COMMERCIAL

## 2020-01-05 DIAGNOSIS — I50.9 ACUTE CHF (CONGESTIVE HEART FAILURE) (HCC): Primary | ICD-10-CM

## 2020-01-05 LAB
ALBUMIN SERPL BCP-MCNC: 3.2 G/DL (ref 3.5–5)
ALP SERPL-CCNC: 86 U/L (ref 46–116)
ALT SERPL W P-5'-P-CCNC: 22 U/L (ref 12–78)
ANION GAP SERPL CALCULATED.3IONS-SCNC: 6 MMOL/L (ref 4–13)
AST SERPL W P-5'-P-CCNC: 19 U/L (ref 5–45)
ATRIAL RATE: 93 BPM
BASE EXCESS BLDA CALC-SCNC: 3 MMOL/L (ref -2–3)
BASOPHILS # BLD AUTO: 0.04 THOUSANDS/ΜL (ref 0–0.1)
BASOPHILS NFR BLD AUTO: 1 % (ref 0–1)
BILIRUB SERPL-MCNC: 1.4 MG/DL (ref 0.2–1)
BUN SERPL-MCNC: 15 MG/DL (ref 5–25)
CALCIUM SERPL-MCNC: 9.4 MG/DL (ref 8.3–10.1)
CHLORIDE SERPL-SCNC: 101 MMOL/L (ref 100–108)
CO2 SERPL-SCNC: 28 MMOL/L (ref 21–32)
CREAT SERPL-MCNC: 1.01 MG/DL (ref 0.6–1.3)
EOSINOPHIL # BLD AUTO: 0.06 THOUSAND/ΜL (ref 0–0.61)
EOSINOPHIL NFR BLD AUTO: 1 % (ref 0–6)
ERYTHROCYTE [DISTWIDTH] IN BLOOD BY AUTOMATED COUNT: 13.9 % (ref 11.6–15.1)
GFR SERPL CREATININE-BSD FRML MDRD: 49 ML/MIN/1.73SQ M
GLUCOSE SERPL-MCNC: 106 MG/DL (ref 65–140)
HCO3 BLDA-SCNC: 27.8 MMOL/L (ref 24–30)
HCT VFR BLD AUTO: 43.4 % (ref 34.8–46.1)
HGB BLD-MCNC: 14.2 G/DL (ref 11.5–15.4)
IMM GRANULOCYTES # BLD AUTO: 0.02 THOUSAND/UL (ref 0–0.2)
IMM GRANULOCYTES NFR BLD AUTO: 0 % (ref 0–2)
LYMPHOCYTES # BLD AUTO: 0.98 THOUSANDS/ΜL (ref 0.6–4.47)
LYMPHOCYTES NFR BLD AUTO: 14 % (ref 14–44)
MCH RBC QN AUTO: 30.9 PG (ref 26.8–34.3)
MCHC RBC AUTO-ENTMCNC: 32.7 G/DL (ref 31.4–37.4)
MCV RBC AUTO: 94 FL (ref 82–98)
MONOCYTES # BLD AUTO: 1.16 THOUSAND/ΜL (ref 0.17–1.22)
MONOCYTES NFR BLD AUTO: 17 % (ref 4–12)
NEUTROPHILS # BLD AUTO: 4.63 THOUSANDS/ΜL (ref 1.85–7.62)
NEUTS SEG NFR BLD AUTO: 67 % (ref 43–75)
NRBC BLD AUTO-RTO: 0 /100 WBCS
NT-PROBNP SERPL-MCNC: 2303 PG/ML
PCO2 BLD: 29 MMOL/L (ref 21–32)
PCO2 BLD: 41.7 MM HG (ref 42–50)
PH BLD: 7.43 [PH] (ref 7.3–7.4)
PLATELET # BLD AUTO: 208 THOUSANDS/UL (ref 149–390)
PMV BLD AUTO: 10.7 FL (ref 8.9–12.7)
PO2 BLD: 31 MM HG (ref 35–45)
POTASSIUM SERPL-SCNC: 4.4 MMOL/L (ref 3.5–5.3)
PROCALCITONIN SERPL-MCNC: <0.05 NG/ML
PROT SERPL-MCNC: 7 G/DL (ref 6.4–8.2)
QRS AXIS: -74 DEGREES
QRSD INTERVAL: 130 MS
QT INTERVAL: 474 MS
QTC INTERVAL: 474 MS
RBC # BLD AUTO: 4.6 MILLION/UL (ref 3.81–5.12)
SAO2 % BLD FROM PO2: 62 % (ref 60–85)
SODIUM SERPL-SCNC: 135 MMOL/L (ref 136–145)
SPECIMEN SOURCE: ABNORMAL
T WAVE AXIS: 89 DEGREES
TROPONIN I SERPL-MCNC: <0.02 NG/ML
VENTRICULAR RATE: 60 BPM
WBC # BLD AUTO: 6.89 THOUSAND/UL (ref 4.31–10.16)

## 2020-01-05 PROCEDURE — 97167 OT EVAL HIGH COMPLEX 60 MIN: CPT

## 2020-01-05 PROCEDURE — 80053 COMPREHEN METABOLIC PANEL: CPT | Performed by: PHYSICIAN ASSISTANT

## 2020-01-05 PROCEDURE — 93005 ELECTROCARDIOGRAM TRACING: CPT

## 2020-01-05 PROCEDURE — 71046 X-RAY EXAM CHEST 2 VIEWS: CPT

## 2020-01-05 PROCEDURE — 99285 EMERGENCY DEPT VISIT HI MDM: CPT

## 2020-01-05 PROCEDURE — 94640 AIRWAY INHALATION TREATMENT: CPT

## 2020-01-05 PROCEDURE — 99285 EMERGENCY DEPT VISIT HI MDM: CPT | Performed by: PHYSICIAN ASSISTANT

## 2020-01-05 PROCEDURE — 87081 CULTURE SCREEN ONLY: CPT | Performed by: PHYSICIAN ASSISTANT

## 2020-01-05 PROCEDURE — 82803 BLOOD GASES ANY COMBINATION: CPT

## 2020-01-05 PROCEDURE — 36415 COLL VENOUS BLD VENIPUNCTURE: CPT | Performed by: PHYSICIAN ASSISTANT

## 2020-01-05 PROCEDURE — 84484 ASSAY OF TROPONIN QUANT: CPT | Performed by: PHYSICIAN ASSISTANT

## 2020-01-05 PROCEDURE — 94760 N-INVAS EAR/PLS OXIMETRY 1: CPT

## 2020-01-05 PROCEDURE — 99222 1ST HOSP IP/OBS MODERATE 55: CPT | Performed by: INTERNAL MEDICINE

## 2020-01-05 PROCEDURE — 85025 COMPLETE CBC W/AUTO DIFF WBC: CPT | Performed by: PHYSICIAN ASSISTANT

## 2020-01-05 PROCEDURE — 94664 DEMO&/EVAL PT USE INHALER: CPT

## 2020-01-05 PROCEDURE — 83880 ASSAY OF NATRIURETIC PEPTIDE: CPT | Performed by: PHYSICIAN ASSISTANT

## 2020-01-05 PROCEDURE — 93010 ELECTROCARDIOGRAM REPORT: CPT | Performed by: INTERNAL MEDICINE

## 2020-01-05 PROCEDURE — 84145 PROCALCITONIN (PCT): CPT | Performed by: PHYSICIAN ASSISTANT

## 2020-01-05 PROCEDURE — 96374 THER/PROPH/DIAG INJ IV PUSH: CPT

## 2020-01-05 RX ORDER — MELATONIN
2000 DAILY
Status: DISCONTINUED | OUTPATIENT
Start: 2020-01-05 | End: 2020-01-09 | Stop reason: HOSPADM

## 2020-01-05 RX ORDER — OXYBUTYNIN CHLORIDE 5 MG/1
10 TABLET, EXTENDED RELEASE ORAL DAILY
Status: DISCONTINUED | OUTPATIENT
Start: 2020-01-05 | End: 2020-01-09 | Stop reason: HOSPADM

## 2020-01-05 RX ORDER — IPRATROPIUM BROMIDE AND ALBUTEROL SULFATE 2.5; .5 MG/3ML; MG/3ML
3 SOLUTION RESPIRATORY (INHALATION)
Status: DISCONTINUED | OUTPATIENT
Start: 2020-01-05 | End: 2020-01-07

## 2020-01-05 RX ORDER — ACETAMINOPHEN 500 MG
500 TABLET ORAL EVERY 8 HOURS PRN
Status: DISCONTINUED | OUTPATIENT
Start: 2020-01-05 | End: 2020-01-06

## 2020-01-05 RX ORDER — POTASSIUM CHLORIDE 20 MEQ/1
40 TABLET, EXTENDED RELEASE ORAL DAILY
Status: CANCELLED | OUTPATIENT
Start: 2020-01-05

## 2020-01-05 RX ORDER — AMOXICILLIN 250 MG
1 CAPSULE ORAL AS NEEDED
Status: DISCONTINUED | OUTPATIENT
Start: 2020-01-05 | End: 2020-01-09 | Stop reason: HOSPADM

## 2020-01-05 RX ORDER — ATORVASTATIN CALCIUM 20 MG/1
20 TABLET, FILM COATED ORAL
Status: DISCONTINUED | OUTPATIENT
Start: 2020-01-05 | End: 2020-01-09 | Stop reason: HOSPADM

## 2020-01-05 RX ORDER — SPIRONOLACTONE 25 MG/1
25 TABLET ORAL DAILY
Status: DISCONTINUED | OUTPATIENT
Start: 2020-01-05 | End: 2020-01-06

## 2020-01-05 RX ORDER — IPRATROPIUM BROMIDE AND ALBUTEROL SULFATE 2.5; .5 MG/3ML; MG/3ML
3 SOLUTION RESPIRATORY (INHALATION) ONCE
Status: COMPLETED | OUTPATIENT
Start: 2020-01-05 | End: 2020-01-05

## 2020-01-05 RX ORDER — FUROSEMIDE 10 MG/ML
40 INJECTION INTRAMUSCULAR; INTRAVENOUS
Status: DISCONTINUED | OUTPATIENT
Start: 2020-01-05 | End: 2020-01-06

## 2020-01-05 RX ORDER — FUROSEMIDE 10 MG/ML
80 INJECTION INTRAMUSCULAR; INTRAVENOUS ONCE
Status: COMPLETED | OUTPATIENT
Start: 2020-01-05 | End: 2020-01-05

## 2020-01-05 RX ORDER — OXYBUTYNIN CHLORIDE 10 MG/1
10 TABLET, EXTENDED RELEASE ORAL DAILY
Status: ON HOLD | COMMUNITY
End: 2020-11-07 | Stop reason: CLARIF

## 2020-01-05 RX ADMIN — FUROSEMIDE 40 MG: 10 INJECTION, SOLUTION INTRAMUSCULAR; INTRAVENOUS at 16:50

## 2020-01-05 RX ADMIN — IPRATROPIUM BROMIDE AND ALBUTEROL SULFATE 3 ML: 2.5; .5 SOLUTION RESPIRATORY (INHALATION) at 11:44

## 2020-01-05 RX ADMIN — Medication 400 MG: at 18:44

## 2020-01-05 RX ADMIN — IPRATROPIUM BROMIDE AND ALBUTEROL SULFATE 3 ML: 2.5; .5 SOLUTION RESPIRATORY (INHALATION) at 15:10

## 2020-01-05 RX ADMIN — ATORVASTATIN CALCIUM 20 MG: 20 TABLET, FILM COATED ORAL at 16:50

## 2020-01-05 RX ADMIN — METOPROLOL TARTRATE 25 MG: 25 TABLET ORAL at 18:44

## 2020-01-05 RX ADMIN — IPRATROPIUM BROMIDE AND ALBUTEROL SULFATE 3 ML: 2.5; .5 SOLUTION RESPIRATORY (INHALATION) at 21:14

## 2020-01-05 RX ADMIN — APIXABAN 5 MG: 5 TABLET, FILM COATED ORAL at 18:44

## 2020-01-05 RX ADMIN — FUROSEMIDE 80 MG: 10 INJECTION, SOLUTION INTRAMUSCULAR; INTRAVENOUS at 12:19

## 2020-01-05 NOTE — ED PROVIDER NOTES
History  Chief Complaint   Patient presents with    Shortness of Breath     To ED with c/o worsening cough with SOB for several days per staff  Denies any CP   Cough     42-year-old female with history of AFib on Eliquis, congestive heart failure, hyperlipidemia, coronary artery disease and dementia presents emergency department for evaluation of cough and shortness of breath times 3-5 days  Patient is a resident a assisted care facility, staff was concerned that her breathing did not improve over the past 2-3 days  Patient is a generally poor historian but does relay some shortness of breath this time  No reported fevers or productive sputum  Patient denies any pain including chest pain at this time  Prior to Admission Medications   Prescriptions Last Dose Informant Patient Reported? Taking?    Mirabegron ER (MYRBETRIQ) 50 MG TB24   Yes No   Sig: Take 50 mg by mouth daily   Omega-3 Fatty Acids (FISH OIL) 1,000 mg   Yes No   Sig: Take 2,000 mg by mouth daily   Polyethylene Glycol 3350 (PEG 3350 PO)   Yes No   Sig: Take 17 g by mouth daily   acetaminophen (TYLENOL) 500 mg tablet   Yes No   Sig: TAKE 1 CAPLET ORALLY THREE TIMES DAILY FOR PAIN *NOT TO EXCEED 4GMS APAP/24HRS*   alendronate (FOSAMAX) 70 mg tablet   Yes No   Sig: Take 70 mg by mouth every 7 days   apixaban (ELIQUIS) 5 mg   Yes No   Sig: Take 5 mg by mouth 2 (two) times a day   atorvastatin (LIPITOR) 20 mg tablet   Yes No   Sig: Take 20 mg by mouth daily   calcium carbonate (OS-DAVE) 600 MG tablet   Yes No   Sig: Take 600 mg by mouth 2 (two) times a day with meals   cholecalciferol (VITAMIN D3) 1,000 units tablet   Yes No   Sig: Take 5,000 Units by mouth daily   dextromethorphan-guaifenesin (MUCINEX DM)  MG per 12 hr tablet   Yes No   Sig: Take 1 tablet by mouth 2 (two) times a day as needed for cough   furosemide (LASIX) 80 mg tablet   No No   Sig: Take 1 tablet by mouth daily   metoprolol tartrate (LOPRESSOR) 25 mg tablet   Yes No Sig: Take 25 mg by mouth 2 (two) times a day   mineral oil enema   Yes No   Sig: Insert 1 enema into the rectum as needed for constipation   oxybutynin (DITROPAN-XL) 10 MG 24 hr tablet   Yes No   Sig: Take 10 mg by mouth daily   potassium chloride (K-DUR,KLOR-CON) 20 mEq tablet   No No   Sig: Take 1 tablet by mouth daily   senna-docusate sodium (SENOKOT S) 8 6-50 mg per tablet   Yes No   Sig: Take 1 tablet by mouth daily   spironolactone (ALDACTONE) 25 mg tablet   No No   Sig: Take 1 tablet by mouth daily      Facility-Administered Medications: None       Past Medical History:   Diagnosis Date    Amnesia     Cardiac disease     [facility]congest[facility] failure) [facility] )     Coronary artery disease     Dementia ([facility] )     Dementia ([facility] )     Disease of thyroid gland     Edema     Hearing loss     Hyperlipidemia     Nocturia     Osteoarthritis     Osteoporosis     Otosclerosis     Pacemaker     Renal disorder     Vitamin D deficiency        Past Surgical History:   Procedure Laterality Date    CARDIAC PACEMAKER PLACEMENT      JOINT REPLACEMENT         History reviewed  No pertinent family history  I have reviewed and agree with the history as documented  Social History     Tobacco Use    Smoking status: Never Smoker    Smokeless tobacco: Never Used   Substance Use Topics    Alcohol use: Yes     Alcohol/week: 7 0 standard drinks     Types: 7 Glasses of wine per week    Drug use: No        Review of Systems   Unable to perform ROS: Dementia       Physical Exam  Physical Exam   Constitutional: She is oriented to person, place, and time  She appears well-developed and well-nourished  No distress  HENT:   Head: Normocephalic and atraumatic  Mouth/Throat: Oropharynx is clear and moist    Eyes: Pupils are equal, round, and reactive to light  No scleral icterus  Neck: No JVD present  Cardiovascular: Normal rate and regular rhythm  Exam reveals no gallop and no friction rub     No murmur heard   Pulmonary/Chest: No accessory muscle usage  Tachypnea noted  No respiratory distress  She has wheezes in the right middle field, the right lower field, the left middle field and the left lower field  She has no rales  Abdominal: Soft  Bowel sounds are normal  She exhibits no distension and no mass  There is no tenderness  There is no rebound and no guarding  Musculoskeletal:        Right lower leg: She exhibits edema  Left lower leg: She exhibits edema  Neurological: She is alert and oriented to person, place, and time  Skin: Skin is warm and dry  Capillary refill takes less than 2 seconds  She is not diaphoretic  No pallor  Psychiatric: She has a normal mood and affect  Her behavior is normal    Vitals reviewed        Vital Signs  ED Triage Vitals   Temperature Pulse Respirations Blood Pressure SpO2   01/05/20 1125 01/05/20 1115 01/05/20 1115 01/05/20 1125 01/05/20 1115   98 °F (36 7 °C) 65 20 145/87 (S) 90 %      Temp Source Heart Rate Source Patient Position - Orthostatic VS BP Location FiO2 (%)   01/05/20 1125 01/05/20 1125 01/05/20 1125 01/05/20 1125 --   Tympanic Monitor Sitting Right arm       Pain Score       01/05/20 1125       No Pain           Vitals:    01/05/20 1215 01/05/20 1230 01/05/20 1245 01/05/20 1300   BP: 118/59 134/63  141/62   Pulse: 60 59 59 62   Patient Position - Orthostatic VS:             Visual Acuity      ED Medications  Medications   acetaminophen (TYLENOL) tablet 500 mg (has no administration in time range)   apixaban (ELIQUIS) tablet 5 mg (has no administration in time range)   atorvastatin (LIPITOR) tablet 20 mg (has no administration in time range)   cholecalciferol (VITAMIN D3) tablet 2,000 Units (2,000 Units Oral Not Given 1/5/20 1414)   metoprolol tartrate (LOPRESSOR) tablet 25 mg (has no administration in time range)   oxybutynin (DITROPAN-XL) 24 hr tablet 10 mg (10 mg Oral Not Given 1/5/20 1416)   senna-docusate sodium (SENOKOT S) 8 6-50 mg per tablet 1 tablet (has no administration in time range)   spironolactone (ALDACTONE) tablet 25 mg (25 mg Oral Not Given 1/5/20 1416)   magnesium oxide (MAG-OX) tablet 400 mg (has no administration in time range)   furosemide (LASIX) injection 40 mg (has no administration in time range)   ipratropium-albuterol (DUO-NEB) 0 5-2 5 mg/3 mL inhalation solution 3 mL (3 mL Nebulization Given 1/5/20 1510)   ipratropium-albuterol (DUO-NEB) 0 5-2 5 mg/3 mL inhalation solution 3 mL (3 mL Nebulization Given 1/5/20 1144)   furosemide (LASIX) injection 80 mg (80 mg Intravenous Given 1/5/20 1219)       Diagnostic Studies  Results Reviewed     Procedure Component Value Units Date/Time    MRSA culture [[id number]] Collected:  01/05/20 1239    Lab Status:   In process Specimen:  Nares from Nose Updated:  01/05/20 1242    NT-BNP PRO [[id number]]  (Abnormal) Collected:  01/05/20 1128    Lab Status:  Final result Specimen:  Blood from Arm, Right Updated:  01/05/20 1202     NT-proBNP 2,303 pg/mL     Troponin I [[id number]]  (Normal) Collected:  01/05/20 1128    Lab Status:  Final result Specimen:  Blood from Arm, Right Updated:  01/05/20 1156     Troponin I <0 02 ng/mL     Comprehensive metabolic panel [[id number]]  (Abnormal) Collected:  01/05/20 1128    Lab Status:  Final result Specimen:  Blood from Arm, Right Updated:  01/05/20 1155     Sodium 135 mmol/L      Potassium 4 4 mmol/L      Chloride 101 mmol/L      CO2 28 mmol/L      ANION GAP 6 mmol/L      BUN 15 mg/dL      Creatinine 1 01 mg/dL      Glucose 106 mg/dL      Calcium 9 4 mg/dL      AST 19 U/L      ALT 22 U/L      Alkaline Phosphatase 86 U/L      Total Protein 7 0 g/dL      Albumin 3 2 g/dL      Total Bilirubin 1 40 mg/dL      eGFR 49 ml/min/1 73sq m     Narrative:       [place] guidelines for Chronic Kidney Disease (CKD):     Stage 1 with normal or high GFR (GFR > 90 mL/min/1 73 square meters)    Stage 2 Mild CKD (GFR = 60-89 mL/min/1 73 square meters)    Stage 3A Moderate CKD (GFR = 45-59 mL/min/1 73 square meters)    Stage 3B Moderate CKD (GFR = 30-44 mL/min/1 73 square meters)    Stage 4 Severe CKD (GFR = 15-29 mL/min/1 73 square meters)    Stage 5 End Stage CKD (GFR <15 mL/min/1 73 square meters)  Note: GFR calculation is accurate only with a steady state creatinine    CBC and differential [[id number]]  (Abnormal) Collected:  01/05/20 1128    Lab Status:  Final result Specimen:  Blood from Arm, Right Updated:  01/05/20 1137     WBC 6 89 Thousand/uL      RBC 4 60 Million/uL      Hemoglobin 14 2 g/dL      Hematocrit 43 4 %      MCV 94 fL      MCH 30 9 pg      MCHC 32 7 g/dL      RDW 13 9 %      MPV 10 7 fL      Platelets 310 Thousands/uL      nRBC 0 /100 WBCs      Neutrophils Relative 67 %      Immat GRANS % 0 %      Lymphocytes Relative 14 %      Monocytes Relative 17 %      Eosinophils Relative 1 %      Basophils Relative 1 %      Neutrophils Absolute 4 63 Thousands/µL      Immature Grans Absolute 0 02 Thousand/uL      Lymphocytes Absolute 0 98 Thousands/µL      Monocytes Absolute 1 16 Thousand/µL      Eosinophils Absolute 0 06 Thousand/µL      Basophils Absolute 0 04 Thousands/µL     Procalcitonin [[id number]] Collected:  01/05/20 1128    Lab Status:   In process Specimen:  Blood from Arm, Right Updated:  01/05/20 1134    POCT Blood Gas (G3+) [[id number]]  (Abnormal) Collected:  01/05/20 1122    Lab Status:  Final result Specimen:  Venous Updated:  01/05/20 1127     ph, [place] ISTAT 7 432     pCO2, Marin i-STAT 41 7 mm HG      pO2, [place] i-STAT 31 0 mm HG      BE, i-STAT 3 mmol/L      HCO3, [place] i-STAT 27 8 mmol/L      CO2, i-STAT 29 mmol/L      O2 Sat, i-STAT 62 %      Specimen Type VENOUS                 XR chest 2 views   ED Interpretation by Melina Thorpe PA-C (01/05 1216)   Interstitial edema                 Procedures  Procedures         ED Course                               MDM  Number of Diagnoses or Management Options  Acute CHF (congestive heart failure) ([facility] ): Diagnosis management comments: 5year-old female presents with tachypnea hypoxia  She is found have mild pulmonary edema with elevated BNP consistent with acute CHF exacerbation  There is no infectious etiology suspected  She will be admitted to Medicine for further management, given IV Lasix in the emergency department to initiate diuresis       Amount and/or Complexity of Data Reviewed  Clinical lab tests: ordered and reviewed  Tests in the radiology section of CPT®: ordered and reviewed  Tests in the medicine section of CPT®: ordered and reviewed  Review and summarize past medical records: yes  Discuss the patient with other providers: yes  Independent visualization of images, tracings, or specimens: yes          Disposition  Final diagnoses:   Acute CHF (congestive heart failure) ([facility] [address] )     Time reflects when diagnosis was documented in both MDM as applicable and the Disposition within this note     Time User Action Codes Description Comment    1/5/2020 12:38 PM Holland Knutson Add [I50 9] Acute CHF (congestive heart failure) [facility])       ED Disposition     ED Disposition Condition Date/Time Comment    Admit Stable Sun Jan 5, 2020 12:38 PM Case was discussed with Dr George Levine and the patient's admission status was agreed to be Admission Status: inpatient status to the service of Dr George Levine           Follow-up Information    None         Current Discharge Medication List      CONTINUE these medications which have NOT CHANGED    Details   acetaminophen (TYLENOL) 500 mg tablet TAKE 1 CAPLET ORALLY THREE TIMES DAILY FOR PAIN *NOT TO EXCEED 4GMS APAP/24HRS*      alendronate (FOSAMAX) 70 mg tablet Take 70 mg by mouth every 7 days      apixaban (ELIQUIS) 5 mg Take 5 mg by mouth 2 (two) times a day      atorvastatin (LIPITOR) 20 mg tablet Take 20 mg by mouth daily      calcium carbonate (OS-DAVE) 600 MG tablet Take 600 mg by mouth 2 (two) times a day with meals      cholecalciferol (VITAMIN D3) 1,000 units tablet Take 5,000 Units by mouth daily      dextromethorphan-guaifenesin (MUCINEX DM)  MG per 12 hr tablet Take 1 tablet by mouth 2 (two) times a day as needed for cough      furosemide (LASIX) 80 mg tablet Take 1 tablet by mouth daily  Qty: 30 tablet, Refills: 0      metoprolol tartrate (LOPRESSOR) 25 mg tablet Take 25 mg by mouth 2 (two) times a day      mineral oil enema Insert 1 enema into the rectum as needed for constipation      Mirabegron ER (MYRBETRIQ) 50 MG TB24 Take 50 mg by mouth daily      Omega-3 Fatty Acids (FISH OIL) 1,000 mg Take 2,000 mg by mouth daily      oxybutynin (DITROPAN-XL) 10 MG 24 hr tablet Take 10 mg by mouth daily      Polyethylene Glycol 3350 (PEG 3350 PO) Take 17 g by mouth daily      potassium chloride (K-DUR,KLOR-CON) 20 mEq tablet Take 1 tablet by mouth daily  Qty: 30 tablet, Refills: 0      senna-docusate sodium (SENOKOT S) 8 6-50 mg per tablet Take 1 tablet by mouth daily      spironolactone (ALDACTONE) 25 mg tablet Take 1 tablet by mouth daily  Qty: 30 tablet, Refills: 0           No discharge procedures on file      ED Provider  Electronically Signed by           Renaldo Duran PA-C  01/05/20 2402

## 2020-01-05 NOTE — H&P
H&P- Oni Mantilla 5/16/1929, 80 y o  female MRN: [id number]    Unit/Bed#: -Ivan Encounter: [id number]    Primary Care Provider: [facility] Moody   Date and time admitted to hospital: 1/5/2020 11:13 AM        Acute on chronic diastolic congestive heart failure (HCC)  Assessment & Plan  Wt Readings from Last 3 Encounters:   01/05/20 99 kg (218 lb 4 1 oz)   01/07/18 93 4 kg (206 lb)   11/01/17 92 9 kg (204 lb 12 9 oz)       Acute on chronic CHF, present on admission as evidenced by shortness of breath, cough, elevated proBNP 2303, chest x-ray finding of pulmonary edema  Last ECHO (10/10/17) at [facility]- EF 60%, mild Aortic stenosis    · Hold home dose Lasix 80 mg daily  · Place patient on IV 40 mg Lasix twice daily  · Recorded weight in the ED today - 218lbs  · Last recorded weight 11/1/17 - 204lbs  · Strict Is/Os- [facility]wick cathter as patient is incontinent  · Check ECHO, no ECHO in Epic  · Consult cardiology  · Currently on 2L NC, keep O2 sats > 92% with supplemental oxygen as needed  · Continue with incentive spirometry, encourage out of bed to chair  · DuoNeb inhalers q i d   · P T /OT    Pacemaker  Assessment & Plan  Status post pacemaker insertion    A-fib [facility])  Assessment & Plan  History of Afib  · EKG shows rate controlled Afib  · Continue with home medications Lopressor 25 mg twice daily and Eliquis    Hyperlipidemia  Assessment & Plan  History of hyperlipidemia on atorvastatin 20 mg daily    VTE Prophylaxis: Apixaban (Eliquis)  / sequential compression device   Code Status:  Full code  POLST: There is no POLST form on file for this patient (pre-hospital)  Discussion with family:  Discussed with patient's daughter and granddaughter at bedside    Anticipated Length of Stay:  Patient will be admitted on an Inpatient basis with an anticipated length of stay of  greater than 2 midnights     Justification for Hospital Stay:  Acute CHF exacerbation    Total Time for Visit, including Counseling / Coordination of Care: 1 hour  Greater than 50% of this total time spent on direct patient counseling and coordination of care  Chief Complaint:   Shortness of breath and cough    History of Present Illness:    Jc Ortega is a 80 y o  female with past medical history of diastolic CHF, atrial fibrillation on Eliquis who presents for evaluation of cough and shortness of breath of 3 days duration  History is gotten from nursing home staff and daughter at bedside  The patient is a very poor historian and is unable to say much  Per nursing home staff, patient seemed to be working hard to breath, and was weak yesterday requiring a wheelchair  At baseline she uses a walker but she could not use her walker yesterday  Family at bedside also reports nonproductive cough, but chest pain, no leg swelling or orthopnea  Last ECHO (10/10/2017) - at [facility] shows EF 60%, normal left systolic function, enlarged RV and mild aortic stenosis    In the ED, patient was hypoxic to 90% on room air and improved to 97% on 2L NC  She was hemodynamically stable  Labs were notable for T nani 1 40, ProBNP 2,303  CXR prelim read - pulmonary edema    Review of Systems:    Review of Systems   Constitutional: Positive for fatigue  Negative for activity change and appetite change  HENT: Negative for congestion, dental problem, drooling and ear discharge  Respiratory: Positive for cough and shortness of breath  Negative for choking, chest tightness, wheezing and stridor  Cardiovascular: Negative for chest pain, palpitations and leg swelling  Gastrointestinal: Negative for abdominal distention, abdominal pain, anal bleeding, blood in stool, constipation and diarrhea  Genitourinary: Negative for difficulty urinating, dyspareunia, enuresis and frequency  Musculoskeletal: Negative for arthralgias, back pain, gait problem, joint swelling and myalgias  Skin: Negative for color change and pallor     Neurological: Negative for light-headedness, numbness and headaches  Psychiatric/Behavioral: Negative for agitation, behavioral problems and decreased concentration  Patient has baseline intermittent confusion and memory issues   All other systems reviewed and are negative  Past Medical and Surgical History:     Past Medical History:   Diagnosis Date    Amnesia     Cardiac disease     [facility]est[facility]) [facility]     Coronary artery disease     Dementia (HCC)     Dementia ([facility] )     Disease of thyroid gland     Edema     Hearing loss     Hyperlipidemia     Nocturia     Osteoarthritis     Osteoporosis     Otosclerosis     Pacemaker     Renal disorder     Vitamin D deficiency        Past Surgical History:   Procedure Laterality Date    CARDIAC PACEMAKER PLACEMENT      JOINT REPLACEMENT         Meds/Allergies:    Prior to Admission medications    Medication Sig Start Date End Date Taking?  Authorizing Provider   acetaminophen (TYLENOL) 500 mg tablet TAKE 1 CAPLET ORALLY THREE TIMES DAILY FOR PAIN *NOT TO EXCEED 4GMS APAP/24HRS* 12/19/17   Historical Provider, MD   alendronate (FOSAMAX) 70 mg tablet Take 70 mg by mouth every 7 days    Historical Provider, MD   apixaban (ELIQUIS) 5 mg Take 5 mg by mouth 2 (two) times a day    Historical Provider, MD   atorvastatin (LIPITOR) 20 mg tablet Take 20 mg by mouth daily    Historical Provider, MD   calcium carbonate (OS-DAVE) 600 MG tablet Take 600 mg by mouth 2 (two) times a day with meals    Historical Provider, MD   cholecalciferol (VITAMIN D3) 1,000 units tablet Take 5,000 Units by mouth daily    Historical Provider, MD   dextromethorphan-guaifenesin (MUCINEX DM)  MG per 12 hr tablet Take 1 tablet by mouth 2 (two) times a day as needed for cough    Historical Provider, MD   furosemide (LASIX) 80 mg tablet Take 1 tablet by mouth daily 11/2/17   Oralia Evans MD   metoprolol tartrate (LOPRESSOR) 25 mg tablet Take 25 mg by mouth 2 (two) times a day Historical Provider, MD   mineral oil enema Insert 1 enema into the rectum as needed for constipation    Historical Provider, MD   Mirabegron ER (MYRBETRIQ) 50 MG TB24 Take 50 mg by mouth daily    Historical Provider, MD   Omega-3 Fatty Acids (FISH OIL) 1,000 mg Take 2,000 mg by mouth daily    Historical Provider, MD   oxybutynin (DITROPAN-XL) 10 MG 24 hr tablet Take 10 mg by mouth daily    Historical Provider, MD   Polyethylene Glycol 3350 (PEG 3350 PO) Take 17 g by mouth daily    Historical Provider, MD   potassium chloride (K-DUR,KLOR-CON) 20 mEq tablet Take 1 tablet by mouth daily 11/2/17   Sachin Laureano MD   senna-docusate sodium (SENOKOT S) 8 6-50 mg per tablet Take 1 tablet by mouth daily    Historical Provider, MD   spironolactone (ALDACTONE) 25 mg tablet Take 1 tablet by mouth daily 11/2/17   Sachin Laureano MD     I have reviewed home medications using allscripts  Allergies: No Known Allergies    Social History:     Marital Status:    Occupation:  Retired  Patient Pre-hospital Living Situation:  Lives in a nursing home  Patient Pre-hospital Level of Mobility:  Uses a walker  Patient Pre-hospital Diet Restrictions:  Low-salt diet  Substance Use History:   Social History     Substance and Sexual Activity   Alcohol Use Yes    Alcohol/week: 7 0 standard drinks    Types: 7 Glasses of wine per week     Social History     Tobacco Use   Smoking Status Never Smoker   Smokeless Tobacco Never Used     Social History     Substance and Sexual Activity   Drug Use No       Family History:    History reviewed  No pertinent family history  Physical Exam:     Vitals:   Blood Pressure: 141/62 (01/05/20 1300)  Pulse: 62 (01/05/20 1300)  Temperature: 98 °F (36 7 °C) (01/05/20 1125)  Temp Source: Tympanic (01/05/20 1125)  Respirations: 20 (01/05/20 1300)  Weight - Scale: 99 kg (218 lb 4 1 oz) (01/05/20 1125)  SpO2: 97 % (01/05/20 1300)    Physical Exam   Constitutional: She is oriented to person, place, and time  She appears well-developed and well-nourished  Eyes: Conjunctivae are normal  Right eye exhibits no discharge  Left eye exhibits no discharge  Neck: Normal range of motion  Neck supple  No thyromegaly present  Cardiovascular: Normal rate, regular rhythm and intact distal pulses  Exam reveals no gallop and no friction rub  Murmur heard  Pulmonary/Chest: No stridor  No respiratory distress  She has wheezes  Abdominal: Soft  Bowel sounds are normal  She exhibits no distension  There is no tenderness  There is no guarding  Musculoskeletal: Normal range of motion  She exhibits no edema, tenderness or deformity  Neurological: She is alert and oriented to person, place, and time  No cranial nerve deficit  Skin: Skin is warm and dry  Capillary refill takes less than 2 seconds  No rash noted  She is not diaphoretic  No erythema  No pallor  Psychiatric: She has a normal mood and affect   Her behavior is normal      Additional Data:     Lab Results: I have personally reviewed pertinent films in PACS    Results from last 7 days   Lab Units 01/05/20  1128   WBC Thousand/uL 6 89   HEMOGLOBIN g/dL 14 2   HEMATOCRIT % 43 4   PLATELETS Thousands/uL 208   NEUTROS PCT % 67   LYMPHS PCT % 14   MONOS PCT % 17*   EOS PCT % 1     Results from last 7 days   Lab Units 01/05/20  1128   SODIUM mmol/L 135*   POTASSIUM mmol/L 4 4   CHLORIDE mmol/L 101   CO2 mmol/L 28   BUN mg/dL 15   CREATININE mg/dL 1 01   ANION GAP mmol/L 6   CALCIUM mg/dL 9 4   ALBUMIN g/dL 3 2*   TOTAL BILIRUBIN mg/dL 1 40*   ALK PHOS U/L 86   ALT U/L 22   AST U/L 19   GLUCOSE RANDOM mg/dL 106                       Imaging: I have personally reviewed pertinent films in PACS    XR chest 2 views   ED Interpretation by Tani Sheridan PA-C (01/05 1216)   Interstitial edema          EKG, Pathology, and Other Studies Reviewed on Admission:   · EKG:  Atrial fibrillation, rate controlled    Allscripts / Epic Records Reviewed: Yes     ** Please Note: This note has been constructed using a voice recognition system   **

## 2020-01-05 NOTE — ED NOTES
O2 sats 90% on RA, denied SOB, placed on 2L NC, sats improved to 93-94%     Susana Pruitt RN  01/05/20 0315

## 2020-01-05 NOTE — ASSESSMENT & PLAN NOTE
Wt Readings from Last 3 Encounters:   01/05/20 99 kg (218 lb 4 1 oz)   01/07/18 93 4 kg (206 lb)   11/01/17 92 9 kg (204 lb 12 9 oz)       Acute on chronic CHF, present on admission as evidenced by shortness of breath, cough, elevated proBNP 2303, chest x-ray finding of pulmonary edema  Last ECHO (10/10/17) at [facility]- EF 60%, mild Aortic stenosis    · Hold home dose Lasix 80 mg daily  · Place patient on IV 40 mg Lasix twice daily  · Recorded weight in the ED today - 218lbs  · Last recorded weight 11/1/17 - 204lbs  · [facility]  · Check ECHO, no ECHO in Epic  · Consult cardiology  · Currently on 2L NC, keep O2 sats > 92% with supplemental oxygen as needed  · Continue with incentive spirometry, encourage out of bed to chair  · DuoNeb inhalers q i d   · P T /OT

## 2020-01-05 NOTE — PLAN OF CARE
Problem: OCCUPATIONAL THERAPY ADULT  Goal: Performs self-care activities at highest level of function for planned discharge setting  See evaluation for individualized goals  Description  Treatment Interventions: ADL retraining, Functional transfer training, Endurance training, Cognitive reorientation, Patient/family training, Equipment evaluation/education, Compensatory technique education, Energy conservation          See flowsheet documentation for full assessment, interventions and recommendations  Note:   Limitation: Decreased ADL status, Decreased Safe judgement during ADL, Decreased cognition, Decreased endurance, Decreased self-care trans, Decreased high-level ADLs  Prognosis: Good  Assessment: Pt is a 80 y o  female seen for OT evaluation at [address] [place] [address], admitted 1/5/2020 w/ Acute on chronic diastolic congestive heart failure ([facility] [address] )  OT completed extensive review of pt's medical and social history  Comorbidities affecting pt's functional performance at time of assessment include: acute on chronic diastolic CHF, A-fib, pacemaker, dementia, BLE edema, and CAD  Personal factors affecting pt at time of IE include:difficulty performing ADLS, difficulty performing IADLS , limited insight into deficits and decreased functional mobility  Prior to admission, pt was living at [facility] and required some assistance with ADLs however was mod I with mobility with use of RW  Upon evaluation, pt presents to OT below baseline due to the following performance deficits: decreased strength, decreased balance, decreased tolerance, impaired attention, impaired memory and decreased safety awareness  Pt to benefit from continued skilled OT tx while in the hospital to address deficits as defined above and maximize level of functional independence w ADL's and functional mobility   Occupational Performance areas to address include: grooming, bathing/shower, toilet hygiene, dressing, health maintenance and functional mobility  Based on findings, pt is of high complexity  At this time, OT recommendations at time of discharge are short term rehab        OT Discharge Recommendation: Short Term Rehab  OT - OK to Discharge: No(Pt pending PT consult)

## 2020-01-05 NOTE — PLAN OF CARE
Problem: Potential for Falls  Goal: Patient will remain free of falls  Description  INTERVENTIONS:  - Assess patient frequently for physical needs  -  Identify cognitive and physical deficits and behaviors that affect risk of falls    -  [place] fall precautions as indicated by assessment   - Educate patient/family on patient safety including physical limitations  - Instruct patient to call for assistance with activity based on assessment  - Modify environment to reduce risk of injury  - Consider OT/PT consult to assist with strengthening/mobility  Outcome: Progressing     Problem: Prexisting or High Potential for Compromised Skin Integrity  Goal: Skin integrity is maintained or improved  Description  INTERVENTIONS:  - Identify patients at risk for skin breakdown  - Assess and monitor skin integrity  - Assess and monitor nutrition and hydration status  - Monitor labs   - Assess for incontinence   - Turn and reposition patient  - Assist with mobility/ambulation  - Relieve pressure over bony prominences  - Avoid friction and shearing  - Provide appropriate hygiene as needed including keeping skin clean and dry  - Evaluate need for skin moisturizer/barrier cream  - Collaborate with interdisciplinary team   - Patient/family teaching  - Consider wound care consult   Outcome: Progressing     Problem: RESPIRATORY - ADULT  Goal: Achieves optimal ventilation and oxygenation  Description  INTERVENTIONS:  - Assess for changes in respiratory status  - Assess for changes in mentation and behavior  - Position to facilitate oxygenation and minimize respiratory effort  - Oxygen administered by appropriate delivery if ordered  - Initiate smoking cessation education as indicated  - Encourage broncho-pulmonary hygiene including cough, deep breathe, Incentive Spirometry  - Assess the need for suctioning and aspirate as needed  - Assess and instruct to report SOB or any respiratory difficulty  - Respiratory Therapy support as indicated  Outcome: Progressing     Problem: PAIN - ADULT  Goal: Verbalizes/displays adequate comfort level or baseline comfort level  Description  Interventions:  - Encourage patient to monitor pain and request assistance  - Assess pain using appropriate pain scale  - Administer analgesics based on type and severity of pain and evaluate response  - Implement non-pharmacological measures as appropriate and evaluate response  - Consider cultural and social influences on pain and pain management  - Notify physician/advanced practitioner if interventions unsuccessful or patient reports new pain  Outcome: Progressing     Problem: SAFETY ADULT  Goal: Maintain or return to baseline ADL function  Description  INTERVENTIONS:  -  Assess patient's ability to carry out ADLs; assess patient's baseline for ADL function and identify physical deficits which impact ability to perform ADLs (bathing, care of mouth/teeth, toileting, grooming, dressing, etc )  - Assess/evaluate cause of self-care deficits   - Assess range of motion  - Assess patient's mobility; develop plan if impaired  - Assess patient's need for assistive devices and provide as appropriate  - Encourage maximum independence but intervene and supervise when necessary  - Involve family in performance of ADLs  - Assess for home care needs following discharge   - Consider OT consult to assist with ADL evaluation and planning for discharge  - Provide patient education as appropriate  Outcome: Progressing  Goal: Maintain or return mobility status to optimal level  Description  INTERVENTIONS:  - Assess patient's baseline mobility status (ambulation, transfers, stairs, etc )    - Identify cognitive and physical deficits and behaviors that affect mobility  - Identify mobility aids required to assist with transfers and/or ambulation (gait belt, sit-to-stand, lift, walker, cane, etc )  - [place] fall precautions as indicated by assessment  - Record patient progress and toleration of activity level on Mobility SBAR; progress patient to next Phase/Stage  - Instruct patient to call for assistance with activity based on assessment  - Consider rehabilitation consult to assist with strengthening/weightbearing, etc   Outcome: Progressing     Problem: DISCHARGE PLANNING  Goal: Discharge to home or other facility with appropriate resources  Description  INTERVENTIONS:  - Identify barriers to discharge w/patient and caregiver  - Arrange for needed discharge resources and transportation as appropriate  - Identify discharge learning needs (meds, wound care, etc )  - Arrange for interpretive services to assist at discharge as needed  - Refer to Case Management Department for coordinating discharge planning if the patient needs post-hospital services based on physician/advanced practitioner order or complex needs related to functional status, cognitive ability, or social support system  Outcome: Progressing     Problem: Knowledge Deficit  Goal: Patient/family/caregiver demonstrates understanding of disease process, treatment plan, medications, and discharge instructions  Description  Complete learning assessment and assess knowledge base    Interventions:  - Provide teaching at level of understanding  - Provide teaching via preferred learning methods  Outcome: Progressing

## 2020-01-05 NOTE — ASSESSMENT & PLAN NOTE
History of Afib  · EKG shows rate controlled Afib  · Continue with home medications Lopressor 25 mg twice daily and Eliquis

## 2020-01-05 NOTE — OCCUPATIONAL THERAPY NOTE
[address] Evaluation     Patient Name: Hansel Mallory  HNZEP'N Date: 1/5/2020  Problem List  Principal Problem:    Acute on chronic diastolic congestive heart failure ([facility] [address] )  Active Problems:    Hyperlipidemia    A-fib [facility])    Pacemaker    Past Medical History  Past Medical History:   Diagnosis Date    Amnesia     Cardiac disease     CHF (congestive heart failure) ([facility] [address] )     Coronary artery disease     Dementia ([facility] )     Dementia ([facility] )     Disease of thyroid gland     Edema     Hearing loss     Hyperlipidemia     Nocturia     Osteoarthritis     Osteoporosis     Otosclerosis     Pacemaker     Renal disorder     Vitamin D deficiency      Past Surgical History  Past Surgical History:   Procedure Laterality Date    CARDIAC PACEMAKER PLACEMENT      JOINT REPLACEMENT           01/05/20 1454   Restrictions/Precautions   Weight Bearing Precautions Per Order No   Other Precautions Fall Risk;O2;Bed Alarm;Cognitive  (2L O2 requirement is new for pt)   Pain Assessment   Pain Assessment No/denies pain   Pain Score No Pain   Home Living   Type of Home Assisted living  ("[facility]")   Home Layout Able to live on main level with bedroom/bathroom   Bathroom Shower/Tub Walk-in shower   Bathroom Toilet Standard   Bathroom Equipment Grab bars in shower; Shower chair   [address] Walker   Prior Function   Level of [place] Needs assistance with IADLs; Needs assistance with ADLs and functional mobility   Lives With Facility staff   Receives Help From Personal care attendant   ADL Assistance Needs assistance   IADLs Needs assistance   Comments Per daughter at bedside, although pt residing at an assisted living, staff did assist pt with bathing and dressing and would also remind pt from time to time to report to dining room for meal times  Pt was able to perform transfers with RW independently   As of recently, pt was unable to use her walker and required w/c for mobility  Psychosocial   Psychosocial (WDL) WDL   ADL   Eating Assistance 7  Independent   Eating Deficit Setup   Grooming Assistance 5  Supervision/Setup   Grooming Deficit Setup   UB Bathing Assistance 4  Minimal Assistance   LB Bathing Assistance 3  Moderate Assistance   UB Dressing Assistance 4  Minimal Assistance   LB Dressing Assistance 3  Moderate Assistance   Toileting Assistance  3  Moderate Assistance   Bed Mobility   Rolling R 4  Minimal assistance   Additional items Assist x 1   Rolling L 5  Supervision   Additional items Verbal cues   Supine to Sit 3  Moderate assistance   Additional items Assist x 1;Verbal cues; Bedrails   Sit to Supine 3  Moderate assistance   Additional items Assist x 1;Verbal cues; Bedrails   Transfers   Sit to Stand 4  Minimal assistance   Additional items Assist x 1;Verbal cues; Bedrails  (RW)   Stand to Sit 4  Minimal assistance   Additional items Assist x 1;Verbal cues; Bedrails   Balance   Static Sitting Good   Dynamic Sitting Fair +   Static Standing Fair -   Dynamic Standing Poor   Activity Tolerance   Activity Tolerance Patient limited by fatigue   Nurse Made Aware  [facility]   RUE Assessment   RUE Assessment WFL   LUE Assessment   LUE Assessment WFL   Cognition   Overall Cognitive Status Impaired   Arousal/Participation Alert   Attention Attends with cues to redirect   Orientation Level Oriented to person;Oriented to place; Disoriented to time;Disoriented to situation   Memory Decreased short term memory;Decreased recall of recent events;Decreased recall of precautions   Following Commands Follows one step commands with increased time or repetition   Assessment   Limitation Decreased ADL status; Decreased Safe judgement during ADL;Decreased cognition;Decreased endurance;Decreased self-care trans;Decreased high-level ADLs   Prognosis Good   Assessment Pt is a 80 y o  female seen for OT evaluation at [facility], admitted 1/5/2020 w/ Acute on chronic diastolic congestive heart failure ([facility] [address] )  OT completed extensive review of pt's medical and social history  Comorbidities affecting pt's functional performance at time of assessment include: acute on chronic diastolic CHF, A-fib, pacemaker, dementia, BLE edema, and CAD  Personal factors affecting pt at time of IE include:difficulty performing ADLS, difficulty performing IADLS , limited insight into deficits and decreased functional mobility  Prior to admission, pt was living at [facility] and required some assistance with ADLs however was mod I with mobility with use of RW  Upon evaluation, pt presents to OT below baseline due to the following performance deficits: decreased strength, decreased balance, decreased tolerance, impaired attention, impaired memory and decreased safety awareness  Pt to benefit from continued skilled OT tx while in the hospital to address deficits as defined above and maximize level of functional independence w ADL's and functional mobility  Occupational Performance areas to address include: grooming, bathing/shower, toilet hygiene, dressing, health maintenance and functional mobility  Based on findings, pt is of high complexity  At this time, OT recommendations at time of discharge are short term rehab  Goals   Patient Goals Pt not verbalizing speciific goals at this time  Plan   Treatment Interventions ADL retraining;Functional transfer training; Endurance training;Cognitive reorientation;Patient/family training;Equipment evaluation/education; Compensatory technique education; Energy conservation   Goal Expiration Date 01/15/20   OT Treatment Day 0   OT Frequency 2-3x/wk   Recommendation   OT Discharge Recommendation Short Term Rehab   OT - OK to Discharge No  (Pt pending PT consult)         Pt will achieve the following goals within 10 days  *Pt will complete UB bathing and dressing with supervision  *Pt will complete LB bathing and dressing with supervision       *Pt will complete toileting (hygiene and clothing management) with supervision  *Pt will complete bed mobility with mod I, with bed flat and no side rail to prep for purposeful tasks    *Pt will perform functional transfers with mod I in order to complete ADL routine  *Pt will increase standing tolerance to 5 minutes in order to complete sinkside ADL task  *Pt will demonstrate increased activity tolerance in order to complete ADL routine  *Pt will identify 3 energy conservation strategies to ensure safety upon discharge        Ari Lindo, OTR/L

## 2020-01-06 ENCOUNTER — APPOINTMENT (INPATIENT)
Dept: NON INVASIVE DIAGNOSTICS | Facility: HOSPITAL | Age: 85
DRG: 292 | End: 2020-01-06
Payer: COMMERCIAL

## 2020-01-06 PROBLEM — I10 HTN (HYPERTENSION): Status: ACTIVE | Noted: 2020-01-06

## 2020-01-06 LAB
ANION GAP SERPL CALCULATED.3IONS-SCNC: 12 MMOL/L (ref 4–13)
BASOPHILS # BLD AUTO: 0.02 THOUSANDS/ΜL (ref 0–0.1)
BASOPHILS NFR BLD AUTO: 0 % (ref 0–1)
BUN SERPL-MCNC: 18 MG/DL (ref 5–25)
CALCIUM SERPL-MCNC: 8.7 MG/DL (ref 8.3–10.1)
CHLORIDE SERPL-SCNC: 100 MMOL/L (ref 100–108)
CO2 SERPL-SCNC: 27 MMOL/L (ref 21–32)
CREAT SERPL-MCNC: 1.06 MG/DL (ref 0.6–1.3)
EOSINOPHIL # BLD AUTO: 0.02 THOUSAND/ΜL (ref 0–0.61)
EOSINOPHIL NFR BLD AUTO: 0 % (ref 0–6)
ERYTHROCYTE [DISTWIDTH] IN BLOOD BY AUTOMATED COUNT: 14.7 % (ref 11.6–15.1)
GFR SERPL CREATININE-BSD FRML MDRD: 46 ML/MIN/1.73SQ M
GLUCOSE SERPL-MCNC: 122 MG/DL (ref 65–140)
HCT VFR BLD AUTO: 42.5 % (ref 34.8–46.1)
HGB BLD-MCNC: 13.9 G/DL (ref 11.5–15.4)
IMM GRANULOCYTES # BLD AUTO: 0 THOUSAND/UL (ref 0–0.2)
IMM GRANULOCYTES NFR BLD AUTO: 0 % (ref 0–2)
LYMPHOCYTES # BLD AUTO: 0.97 THOUSANDS/ΜL (ref 0.6–4.47)
LYMPHOCYTES NFR BLD AUTO: 15 % (ref 14–44)
MAGNESIUM SERPL-MCNC: 2.3 MG/DL (ref 1.6–2.6)
MCH RBC QN AUTO: 30.7 PG (ref 26.8–34.3)
MCHC RBC AUTO-ENTMCNC: 32.7 G/DL (ref 31.4–37.4)
MCV RBC AUTO: 94 FL (ref 82–98)
MONOCYTES # BLD AUTO: 0.84 THOUSAND/ΜL (ref 0.17–1.22)
MONOCYTES NFR BLD AUTO: 13 % (ref 4–12)
MRSA NOSE QL CULT: NORMAL
NEUTROPHILS # BLD AUTO: 4.8 THOUSANDS/ΜL (ref 1.85–7.62)
NEUTS SEG NFR BLD AUTO: 72 % (ref 43–75)
PHOSPHATE SERPL-MCNC: 3.8 MG/DL (ref 2.3–4.1)
PLATELET # BLD AUTO: 218 THOUSANDS/UL (ref 149–390)
PMV BLD AUTO: 10.5 FL (ref 8.9–12.7)
POTASSIUM SERPL-SCNC: 4.2 MMOL/L (ref 3.5–5.3)
RBC # BLD AUTO: 4.53 MILLION/UL (ref 3.81–5.12)
SODIUM SERPL-SCNC: 139 MMOL/L (ref 136–145)
WBC # BLD AUTO: 6.65 THOUSAND/UL (ref 4.31–10.16)

## 2020-01-06 PROCEDURE — 97530 THERAPEUTIC ACTIVITIES: CPT

## 2020-01-06 PROCEDURE — 99232 SBSQ HOSP IP/OBS MODERATE 35: CPT | Performed by: INTERNAL MEDICINE

## 2020-01-06 PROCEDURE — 93306 TTE W/DOPPLER COMPLETE: CPT

## 2020-01-06 PROCEDURE — 93306 TTE W/DOPPLER COMPLETE: CPT | Performed by: INTERNAL MEDICINE

## 2020-01-06 PROCEDURE — 80048 BASIC METABOLIC PNL TOTAL CA: CPT | Performed by: INTERNAL MEDICINE

## 2020-01-06 PROCEDURE — 84100 ASSAY OF PHOSPHORUS: CPT | Performed by: INTERNAL MEDICINE

## 2020-01-06 PROCEDURE — 97163 PT EVAL HIGH COMPLEX 45 MIN: CPT

## 2020-01-06 PROCEDURE — 94760 N-INVAS EAR/PLS OXIMETRY 1: CPT

## 2020-01-06 PROCEDURE — 85025 COMPLETE CBC W/AUTO DIFF WBC: CPT | Performed by: INTERNAL MEDICINE

## 2020-01-06 PROCEDURE — 83735 ASSAY OF MAGNESIUM: CPT | Performed by: INTERNAL MEDICINE

## 2020-01-06 PROCEDURE — 99254 IP/OBS CNSLTJ NEW/EST MOD 60: CPT | Performed by: INTERNAL MEDICINE

## 2020-01-06 PROCEDURE — 94640 AIRWAY INHALATION TREATMENT: CPT

## 2020-01-06 RX ORDER — ACETAMINOPHEN 325 MG/1
650 TABLET ORAL EVERY 8 HOURS PRN
Status: DISCONTINUED | OUTPATIENT
Start: 2020-01-06 | End: 2020-01-09 | Stop reason: HOSPADM

## 2020-01-06 RX ORDER — QUETIAPINE FUMARATE 25 MG/1
25 TABLET, FILM COATED ORAL ONCE
Status: COMPLETED | OUTPATIENT
Start: 2020-01-07 | End: 2020-01-07

## 2020-01-06 RX ORDER — FUROSEMIDE 10 MG/ML
80 INJECTION INTRAMUSCULAR; INTRAVENOUS
Status: DISCONTINUED | OUTPATIENT
Start: 2020-01-06 | End: 2020-01-08

## 2020-01-06 RX ADMIN — ATORVASTATIN CALCIUM 20 MG: 20 TABLET, FILM COATED ORAL at 16:09

## 2020-01-06 RX ADMIN — Medication 400 MG: at 09:17

## 2020-01-06 RX ADMIN — IPRATROPIUM BROMIDE AND ALBUTEROL SULFATE 3 ML: 2.5; .5 SOLUTION RESPIRATORY (INHALATION) at 13:34

## 2020-01-06 RX ADMIN — IPRATROPIUM BROMIDE AND ALBUTEROL SULFATE 3 ML: 2.5; .5 SOLUTION RESPIRATORY (INHALATION) at 02:43

## 2020-01-06 RX ADMIN — PERFLUTREN 1.2 ML/MIN: 6.52 INJECTION, SUSPENSION INTRAVENOUS at 12:31

## 2020-01-06 RX ADMIN — METOPROLOL TARTRATE 25 MG: 25 TABLET ORAL at 17:29

## 2020-01-06 RX ADMIN — APIXABAN 5 MG: 5 TABLET, FILM COATED ORAL at 17:29

## 2020-01-06 RX ADMIN — OXYBUTYNIN CHLORIDE 10 MG: 5 TABLET, EXTENDED RELEASE ORAL at 09:17

## 2020-01-06 RX ADMIN — FUROSEMIDE 80 MG: 10 INJECTION, SOLUTION INTRAMUSCULAR; INTRAVENOUS at 16:15

## 2020-01-06 RX ADMIN — Medication 400 MG: at 17:29

## 2020-01-06 RX ADMIN — METOPROLOL TARTRATE 25 MG: 25 TABLET ORAL at 09:17

## 2020-01-06 RX ADMIN — APIXABAN 5 MG: 5 TABLET, FILM COATED ORAL at 09:17

## 2020-01-06 RX ADMIN — MELATONIN 2000 UNITS: at 09:17

## 2020-01-06 RX ADMIN — IPRATROPIUM BROMIDE AND ALBUTEROL SULFATE 3 ML: 2.5; .5 SOLUTION RESPIRATORY (INHALATION) at 20:57

## 2020-01-06 RX ADMIN — IPRATROPIUM BROMIDE AND ALBUTEROL SULFATE 3 ML: 2.5; .5 SOLUTION RESPIRATORY (INHALATION) at 07:28

## 2020-01-06 NOTE — NUTRITION
01/06/20 1351   Assessment   Timepoint Initial  (MD consult for CHF education)   Labs   List Completed Labs   (labs reviewed  meds: lipitor, lasix, Mg-Ox)   Feeding Route   PO Independent   Adequacy of Intake   Nutrition Modality PO  (Cardiac, 1500 ml Fluid restriction)   Intake Meals %;0-25%   Estimated Calorie Intake 50-75%   Estimated Protein Intake  50-75%   Estimated Fluid Intake %   Estimated calorie intake compared to estimated need took 100% of brk tray and none of lunch yet  likely suboptimal to meet estimated needs  Nutrition Prognosis   Nutrition Concerns   (skin care plan reviewed )   Comorbid Concerns   (per chart review: CHF, pulmonary edema,)   Nutrition Precautions   (PMH: CHF, pacemaker, Afib, elvated lipids)   Nutrition Considerations   (diet ed: pt not appropriate for diet education  pt with history of dementia  )   PES Statement   Problem Intake   Energy Balance (1) Inadequate energy intake NI-1 2   Related to Decreased Appetite   As evidenced by: Intake < estimated needs   Patient Nutrition Goals   Goal meet PO needs;increase Kcal/Pro   Goal Status initiated   Timeframe to complete goal by next f/u   Recommendations/Interventions   Summary Pt not appropriate for CHF education, given dementia  Monitor need for po supplement and po intake      Malnutrition/BMI Present No   Interventions Diet: continued as ordered   Nutrition Recommendations Continue diet as ordered   Nutrition Complexity Risk   Nutrition complexity level Moderate risk   Nutrition review: 01/09/20  (po intake, supplement need? )   Follow up date 01/13/20

## 2020-01-06 NOTE — PHYSICAL THERAPY NOTE
PT eval   01/06/20 0925   Note Type   Note type Eval only   Pain Assessment   Pain Assessment No/denies pain   Pain Score No Pain   Home Living   Type of Home Assisted living   Home Layout Able to live on main level with bedroom/bathroom   Home Equipment Walker   Additional Comments uses RW but lately wc to DR   Prior Function   Level of [place] Modified independent with wheelchair;Needs assistance with IADLs; Needs assistance with ADLs and functional mobility   Lives With Facility staff   Receives Help From Personal care attendant   ADL Assistance Needs assistance   IADLs Needs assistance   Vocational Retired   Restrictions/Precautions   [place] Bearing Precautions Per Order No   Other Precautions Chair Alarm; Bed Alarm;Cognitive; Fall Risk;O2   General   Additional Pertinent History and with CHF on 02 2L   Family/Caregiver Present No   Cognition   Overall Cognitive Status Impaired   Orientation Level Oriented to person;Oriented to place   RUE Assessment   RUE Assessment   (appeasr grossly functional some arhtritic changes)   LUE Assessment   LUE Assessment   (grossly functional arthritic changes)   RLE Assessment   RLE Assessment WFL  (strength 3+/5)   LLE Assessment   LLE Assessment WFL  (strength 3+/5)   Coordination   Movements are Fluid and Coordinated 1   Proprioception   RLE Proprioception Grossly intact   LLE Proprioception Grossly Intact   Bed Mobility   Rolling R 3  Moderate assistance   Additional items Assist x 2   Rolling L 3  Moderate assistance   Additional items Assist x 2   Supine to Sit 3  Moderate assistance   Additional items Assist x 2   Transfers   Sit to Stand 3  Moderate assistance   Additional items Assist x 2   Stand to Sit 3  Moderate assistance   Additional items Assist x 2   Stand pivot 3  Moderate assistance   Additional items Assist x 2  (rw)   Ambulation/Elevation   Gait pattern Forward Flexion;Narrow LATISHA;Shuffling; Inconsistent dominique; Foward flexed; Short stride; Step to;Excessively slow   Gait Assistance 3  Moderate assist   Additional items Assist x 2   Assistive Device Rolling walker  (leans on forearms on front of walker,assist to correct)   Distance 5'   Balance   Static Sitting Fair   Dynamic Sitting Fair -   Static Standing Fair -   Dynamic Standing Poor +   Ambulatory Poor +   Endurance Deficit   Endurance Deficit Yes   Endurance Deficit Description tires quickly but 02 sat 94% on room air   Activity Tolerance   Activity Tolerance Patient limited by fatigue   Medical Staff Made Aware OT Kaycee   Nurse Made Aware PRABHU Azevedo   Assessment   Prognosis Good   Problem List Decreased strength;Decreased endurance; Impaired balance;Decreased mobility; Decreased coordination;Decreased safety awareness; Impaired hearing   Assessment Pt requires signif assist to mobilize to edge of bed and then with few steps and rW to chair  Set up for breakfast  Can benefit from skilledTP serivces to address le wekanss imapried trnasfers, balanace and gait  Recommend shor tterm in-pt rehab atd/c will follow see goals   Barriers to Discharge   (medical clearance)   Goals   Patient Goals get better    STG Expiration Date 01/16/20   Short Term Goal #1 1) improve stregnth 1/2 grade 2) safe idn transfers with rw 3) safe ind abm with rw 50' level no sob   Plan   Treatment/Interventions ADL retraining;Functional transfer training;LE strengthening/ROM; Therapeutic exercise; Endurance training;Patient/family training;Equipment eval/education; Bed mobility;Gait training;Spoke to nursing;Spoke to case management;OT   PT Frequency 5x/wk   Recommendation   Recommendation Short-term skilled PT   Equipment Recommended Walker   PT - OK to Discharge No   Cheryle Negus, PT

## 2020-01-06 NOTE — ASSESSMENT & PLAN NOTE
Wt Readings from Last 3 Encounters:   01/06/20 98 1 kg (216 lb 4 3 oz)   01/07/18 93 4 kg (206 lb)   11/01/17 92 9 kg (204 lb 12 9 oz)       Acute on chronic diastolic CHF, present on admission as evidenced by shortness of breath, cough, elevated proBNP 2303, chest x-ray finding of pulmonary edema  Last ECHO (10/10/17) at [facility]- EF 60%, mild Aortic stenosis    · Continue diuresis with Lasix 40 mg IV twice daily    Will increase to 80 mg IV twice daily  · Recorded weight in the ED today - 218lbs  · Last recorded weight 11/1/17 - 204lbs  · Daily weight and I&Os  · Strict Is/Os- purwick cathter as patient is incontinent  · Check ECHO, no ECHO in Epic  · Cardiology consult is pending  · Currently on 2L NC, keep O2 sats > 92% with supplemental oxygen as needed  · Continue with incentive spirometry, encourage out of bed to chair  · DuoNeb inhalers q i d   · P T /OT

## 2020-01-06 NOTE — PROGRESS NOTES
Pleasant woman  Receptive to   Presented in good spirits  No signs of despondency or distress  Finds her life long tramaine to be a comfort and source of strength        01/06/20 [address]    Spiritual Beliefs/Perceptions   Concept of God Accepting   Relationship with God Close   Unmet Spiritual Needs   (None reported )   Support Systems Children;Family members   Stress Factors   Patient Stress Factors None identified   Coping Responses   Patient Coping Accepting   Plan of Care   Assessment Completed by: Unit visit

## 2020-01-06 NOTE — UTILIZATION REVIEW
Initial Clinical Review    Admission: Date/Time/Statement: Inpatient Admission Orders (From admission, onward)     Ordered        01/05/20 1234  Inpatient Admission (expected length of stay for this patient Order details is greater than two midnights)  Once                   Orders Placed This Encounter   Procedures    Inpatient Admission (expected length of stay for this patient Order details is greater than two midnights)     Standing Status:   Standing     Number of Occurrences:   1     Order Specific Question:   Admitting Physician     Answer:   Alison Palafox     Order Specific Question:   Level of Care     Answer:   Med Surg [16]     Order Specific Question:   Estimated length of stay     Answer:   More than 2 Midnights     Order Specific Question:   Certification     Answer:   I certify that inpatient services are medically necessary for this patient for a duration of greater than two midnights  See H&P and MD Progress Notes for additional information about the patient's course of treatment  ED Arrival Information     Expected Arrival Acuity Means of Arrival Escorted By Service Admission Type    - 1/5/2020 11:13 Urgent Ambulance [facility] EMS Hospitalist Urgent    Arrival Complaint    short of breath        Chief Complaint   Patient presents with    Shortness of Breath     To ED with c/o worsening cough with SOB for several days per staff  Denies any CP   Cough     Assessment/Plan:  80 y o  female with PMHx of diastolic CHF, Atrial fib on Eliquis who presents to ED form SNF with cough and sob for ~ 3 days  Per nursing home staff, patient seemed to be working hard to breath, and had increased weakness yesterday requiring a wheelchair when normally walks with a walker  In the ED, patient hypoxic to 90% on RA and improved to 97% on 2L NC, BNP 2,303  CXR with interstitial edema   (+) wheezes on exam     Admit inpatient to M/S/Tele unit with acute on chronic diastolic congestive heart failure -- tele monitoring, Echo, lasix IV BID, daily wts, I/O's, O2 as needed currently on 2 lpm nc, continue IS q1h, duonebs qid, cardiac diet, 1500 ml/day fluid restriction, PT/OT evals, continue home po meds, cardiology consult  Cardiology consult 1/6 -- A/P: Acute on Chronic Diastolic CHF: She is clinically decompensated with pulmonary edema  Continue with IV diuretics as tolerated  Check standing weights if able  Trend I/O, etc     Perm  Atrial FIbrillation: Continue eliquis and metoprolol  History of PPM implant   Rates are stable      ED Triage Vitals   Temperature Pulse Respirations Blood Pressure SpO2   01/05/20 1125 01/05/20 1115 01/05/20 1115 01/05/20 1125 01/05/20 1115   98 °F (36 7 °C) 65 20 145/87 (S) 90 %      Temp Source Heart Rate Source Patient Position - Orthostatic VS BP Location FiO2 (%)   01/05/20 1125 01/05/20 1125 01/05/20 1125 01/05/20 1125 --   Tympanic Monitor Sitting Right arm       Pain Score       01/05/20 1125       No Pain        Wt Readings from Last 1 Encounters:   01/06/20 98 1 kg (216 lb 4 3 oz)     Additional Vital Signs:  Date/Time  Temp  Pulse  Resp  BP  MAP (mmHg)  SpO2  O2 Device   01/06/20 1335            94 %  None (Room air)   01/06/20 0900            94 %  None (Room air)   01/06/20 07:23:51  98 9 °F (37 2 °C)  71  12  118/50  73  95 %     01/06/20 0246    67  20      96 %  Nasal cannula   01/06/20 00:16:55  99 7 °F (37 6 °C)  59  18  121/48Abnormal   72  97 %     01/05/20 2100              Nasal cannula   01/05/20 15:33:12  97 8 °F (36 6 °C)  60  20  129/56  80  96 %     01/05/20 1300    62  20  141/62    97 %  Nasal cannula   01/05/20 1200    55    118/56    99 %  Nasal cannula   01/05/20 1145    50Abnormal   20  110/57    96 %    respiratory tx on   01/05/20 1130    54Abnormal   22  103/58    90 %       01/05/20 1128              None (Room air)   01/05/20 1125  98 °F (36 7 °C)  98  20  145/87    92 %  None (Room air) 01/05/20 1115    65  20      90 %  _  None (Room air)       Pertinent Labs/Diagnostic Test Results:   CXR 1/6 --- Question mild central vascular congestion and trace left pleural effusion      Results from last 7 days   Lab Units 01/06/20  0527 01/05/20  1128   WBC Thousand/uL 6 65 6 89   HEMOGLOBIN g/dL 13 9 14 2   HEMATOCRIT % 42 5 43 4   PLATELETS Thousands/uL 218 208   NEUTROS ABS Thousands/µL 4 80 4 63     Results from last 7 days   Lab Units 01/06/20  0527 01/05/20  1128 01/05/20  1122   SODIUM mmol/L 139 135*  --    POTASSIUM mmol/L 4 2 4 4  --    CHLORIDE mmol/L 100 101  --    CO2 mmol/L 27 28  --    CO2, I-STAT mmol/L  --   --  29   ANION GAP mmol/L 12 6  --    BUN mg/dL 18 15  --    CREATININE mg/dL 1 06 1 01  --    EGFR ml/min/1 73sq m 46 49  --    CALCIUM mg/dL 8 7 9 4  --    MAGNESIUM mg/dL 2 3  --   --    PHOSPHORUS mg/dL 3 8  --   --      Results from last 7 days   Lab Units 01/05/20  1128   AST U/L 19   ALT U/L 22   ALK PHOS U/L 86   TOTAL PROTEIN g/dL 7 0   ALBUMIN g/dL 3 2*   TOTAL BILIRUBIN mg/dL 1 40*     Results from last 7 days   Lab Units 01/06/20  0527 01/05/20  1128   GLUCOSE RANDOM mg/dL 122 106     Results from last 7 days   Lab Units 01/05/20  1122   PH, SAGE I-STAT  7 432*   PCO2, SAGE ISTAT mm HG 41 7*   PO2, SAGE ISTAT mm HG 31 0*   HCO3, SAGE ISTAT mmol/L 27 8   I STAT BASE EXC mmol/L 3   I STAT O2 SAT % 62     Results from last 7 days   Lab Units 01/05/20  1128   TROPONIN I ng/mL <0 02     Results from last 7 days   Lab Units 01/05/20  1128   PROCALCITONIN ng/ml <0 05     Results from last 7 days   Lab Units 01/05/20  1128   NT-PRO BNP pg/mL 2,303*     ED Treatment:   Medication Administration from 01/05/2020 1110 to 01/05/2020 1338       Date/Time Order Dose Route Action     01/05/2020 1144 ipratropium-albuterol (DUO-NEB) 0 5-2 5 mg/3 mL inhalation solution 3 mL 3 mL Nebulization Given     01/05/2020 1219 furosemide (LASIX) injection 80 mg 80 mg Intravenous Given     Past Medical History:   Diagnosis Date    Amnesia     Cardiac disease     CHF (congestive heart failure) (HCC)     Coronary artery disease     Dementia (HCC)     Dementia (HCC)     Disease of thyroid gland     Edema     Hearing loss     Hyperlipidemia     Nocturia     Osteoarthritis     Osteoporosis     Otosclerosis     Pacemaker     Renal disorder     Vitamin D deficiency      Present on Admission:   Acute on chronic diastolic congestive heart failure (HCC)   Hyperlipidemia   A-fib (HCC)   Pacemaker      Admitting Diagnosis: Shortness of breath [R06 02]  Acute CHF (congestive heart failure) (HCC) [I50 9]  Age/Sex: 80 y o  female  Admission Orders:  Scheduled Medications:  Medications:  apixaban 5 mg Oral BID   atorvastatin 20 mg Oral Daily With Dinner   cholecalciferol 2,000 Units Oral Daily   furosemide 80 mg Intravenous BID (diuretic)   ipratropium-albuterol 3 mL Nebulization Q6H   magnesium oxide 400 mg Oral BID   metoprolol tartrate 25 mg Oral BID   oxybutynin 10 mg Oral Daily     Continuous IV Infusions: none     PRN Meds:  acetaminophen 500 mg Oral Q8H PRN   senna-docusate sodium 1 tablet Oral PRN       IP CONSULT TO CASE MANAGEMENT  IP CONSULT TO NUTRITION SERVICES  IP CONSULT TO CARDIOLOGY    Network Utilization Review Department  [email or web address]  ATTENTION: Please call with any questions or concerns to [phone] and carefully listen to the prompts so that you are directed to the right person  All voicemails are confidential   Siri Draper all requests for admission clinical reviews, approved or denied determinations and any other requests to dedicated fax number below belonging to the campus where the patient is receiving treatment   List of dedicated fax numbers for the Facilities:  FACILITY NAME UR FAX NUMBER   ADMISSION DENIALS (Administrative/Medical Necessity) [phone]   [address] (Maternity/NICU/Pediatrics) [place] [address] [facility] [phone]   Mack Tipton [phone]   [address] [phone]   Nanda Golden [phone]   [address] [facility]  [address] [facility] [address] [facility] [phone]

## 2020-01-06 NOTE — PLAN OF CARE
Problem: PHYSICAL THERAPY ADULT  Goal: Performs mobility at highest level of function for planned discharge setting  See evaluation for individualized goals  Description  Treatment/Interventions: ADL retraining, Functional transfer training, LE strengthening/ROM, Therapeutic exercise, Endurance training, Patient/family training, Equipment eval/education, Bed mobility, Gait training, Spoke to nursing, Spoke to case management, OT  Equipment Recommended: Cleavon Gone       See flowsheet documentation for full assessment, interventions and recommendations  Outcome: Progressing  Note:   Prognosis: Good  Problem List: Decreased strength, Decreased endurance, Impaired balance, Decreased mobility, Decreased coordination, Decreased safety awareness, Impaired hearing  Assessment: Pt requires signif assist to mobilize to edge of bed and then with few steps and rW to chair  Set up for breakfast  Can benefit from skilledTP serivces to address le wekanss imapried trnasfers, balanace and gait  Recommend shor tterm in-pt rehab atd/c will follow see goals  Barriers to Discharge: (medical clearance)     Recommendation: Short-term skilled PT     PT - OK to Discharge: No    See flowsheet documentation for full assessment

## 2020-01-06 NOTE — PROGRESS NOTES
Progress Note Cornelious Brain 5/16/1929, 80 y o  female MRN: [id number]    Unit/Bed#: -01 Encounter: [id number]    Primary Care Provider: OH Kolb   Date and time admitted to hospital: 1/5/2020 11:13 AM        Pacemaker  Assessment & Plan  Status post pacemaker insertion    [facility]  Assessment & Plan  History of Afib  · EKG shows rate controlled Afib  · Continue with home medications Lopressor 25 mg twice daily and Eliquis    Hyperlipidemia  Assessment & Plan  History of hyperlipidemia on atorvastatin 20 mg daily    * Acute on chronic diastolic congestive heart failure (HCC)  Assessment & Plan  Wt Readings from Last 3 Encounters:   01/06/20 98 1 kg (216 lb 4 3 oz)   01/07/18 93 4 kg (206 lb)   11/01/17 92 9 kg (204 lb 12 9 oz)       Acute on chronic diastolic CHF, present on admission as evidenced by shortness of breath, cough, elevated proBNP 2303, chest x-ray finding of pulmonary edema  Last ECHO (10/10/17) at [facility]- EF 60%, mild Aortic stenosis    · Continue diuresis with Lasix 40 mg IV twice daily  Will increase to 80 mg IV twice daily  · Recorded weight in the ED today - 218lbs  · Last recorded weight 11/1/17 - 204lbs  · Daily weight and I&Os  · Strict Is/Os- purwick cathter as patient is incontinent  · Check ECHO, no ECHO in Epic  · Cardiology consult is pending  · Currently on 2L NC, keep O2 sats > 92% with supplemental oxygen as needed  · Continue with incentive spirometry, encourage out of bed to chair  · DuoNeb inhalers q i d   · P T /OT      Labs & Imaging: I have personally reviewed pertinent reports  VTE Pharmacologic Prophylaxis:  Eliquis  VTE Mechanical Prophylaxis: sequential compression device    Code Status:   Level 3 - DNAR and DNI    Patient Centered Rounds: I have performed bedside rounds with nursing staff today      Discussions with Specialists or Other Care Team Provider:     Current Length of Stay: 1 day(s)    Current Patient Status: Inpatient Certification Statement: The patient will continue to require additional inpatient hospital stay due to see my assessment and plan  Subjective:   Patient is seen and examined at bedside  Still has some shortness of breath and cough  Afebrile  Denies any chest pain  Denies any new complaints  Not a good historian  All other ROS are negative  Objective:    Vitals: Blood pressure 118/50, pulse 71, temperature 98 9 °F (37 2 °C), resp  rate 12, weight 98 1 kg (216 lb 4 3 oz), SpO2 95 %, not currently breastfeeding  ,Body mass index is 35 99 kg/m²  SPO2 RA Rest      ED to Hosp-Admission (Current) from 1/5/2020 in [address] Med Surg Unit   SpO2  95 %   SpO2 Activity  At Rest   O2 Device  None (Room air)   O2 Flow Rate  2 L/min        I&O: No intake or output data in the 24 hours ending 01/06/20 0804    Physical Exam:    General- Alert, lying comfortably in bed  Not in any acute distress  HEENT- YEIMI, EOM intact  Neck- Supple, No JVD  CVS- irregular, S1 and S2 normal  Chest- Bilateral Air entry, basilar rales present  Abdomen- soft, nontender, not distended, no guarding or rigidity, BS+  Extremities-  No pedal edema, No calf tenderness  CNS-   Alert, awake and orientedx2  No focal deficits present  Invasive Devices     Peripheral Intravenous Line            Peripheral IV 01/05/20 Right Antecubital less than 1 day          Drain            External Urinary Catheter less than 1 day                      Social History  reviewed  History reviewed  No pertinent family history   reviewed    Meds:  Current Facility-Administered Medications   Medication Dose Route Frequency Provider Last Rate Last Dose    acetaminophen (TYLENOL) tablet 500 mg  500 mg Oral Q8H PRN Олег Jean MD        apixaban (ELIQUIS) tablet 5 mg  5 mg Oral BID Олег Jean MD   5 mg at 01/05/20 4565    atorvastatin (LIPITOR) tablet 20 mg  20 mg Oral Daily With Shaun Mcrae MD   20 mg at 01/05/20 1650    cholecalciferol (VITAMIN D3) tablet 2,000 Units  2,000 Units Oral Daily Evonne Larson MD        furosemide (LASIX) injection 80 mg  80 mg Intravenous BID (diuretic) Moira Carrillo MD        ipratropium-albuterol (DUO-NEB) 0 5-2 5 mg/3 mL inhalation solution 3 mL  3 mL Nebulization Q6H Evonne Larson MD   3 mL at 01/06/20 6074    magnesium oxide (MAG-OX) tablet 400 mg  400 mg Oral BID Evonne Larson MD   400 mg at 01/05/20 1844    metoprolol tartrate (LOPRESSOR) tablet 25 mg  25 mg Oral BID Evonne Larson MD   25 mg at 01/05/20 1844    oxybutynin (DITROPAN-XL) 24 hr tablet 10 mg  10 mg Oral Daily Evonne Larson MD        senna-docusate sodium (SENOKOT S) 8 6-50 mg per tablet 1 tablet  1 tablet Oral PRN Evonne Larson MD          Medications Prior to Admission   Medication    acetaminophen (TYLENOL) 500 mg tablet    alendronate (FOSAMAX) 70 mg tablet    apixaban (ELIQUIS) 5 mg    atorvastatin (LIPITOR) 20 mg tablet    calcium carbonate (OS-DAVE) 600 MG tablet    cholecalciferol (VITAMIN D3) 1,000 units tablet    dextromethorphan-guaifenesin (MUCINEX DM)  MG per 12 hr tablet    furosemide (LASIX) 80 mg tablet    metoprolol tartrate (LOPRESSOR) 25 mg tablet    mineral oil enema    Mirabegron ER (MYRBETRIQ) 50 MG TB24    Omega-3 Fatty Acids (FISH OIL) 1,000 mg    oxybutynin (DITROPAN-XL) 10 MG 24 hr tablet    Polyethylene Glycol 3350 (PEG 3350 PO)    potassium chloride (K-DUR,KLOR-CON) 20 mEq tablet    senna-docusate sodium (SENOKOT S) 8 6-50 mg per tablet    spironolactone (ALDACTONE) 25 mg tablet       Labs:  Results from last 7 days   Lab Units 01/06/20  0527 01/05/20  1128   WBC Thousand/uL 6 65 6 89   HEMOGLOBIN g/dL 13 9 14 2   HEMATOCRIT % 42 5 43 4   PLATELETS Thousands/uL 218 208   NEUTROS PCT % 72 67   LYMPHS PCT % 15 14   MONOS PCT % 13* 17*   EOS PCT % 0 1     Results from last 7 days   Lab Units 01/06/20  0553 01/05/20  1128 01/05/20  1122   POTASSIUM mmol/L 4 2 4 4  --    CHLORIDE mmol/L 100 101  --    CO2 mmol/L 27 28  --    CO2, I-STAT mmol/L  --   --  29   BUN mg/dL 18 15  --    CREATININE mg/dL 1 06 1 01  --    CALCIUM mg/dL 8 7 9 4  --    ALK PHOS U/L  --  86  --    ALT U/L  --  22  --    AST U/L  --  19  --      Lab Results   Component Value Date    TROPONINI <0 02 01/05/2020    TROPONINI <0 02 10/23/2017         No results found for: José Migueljoey Lee, SPUTUMCULTUR      Imaging:  No results found for this or any previous visit  Results for orders placed during the hospital encounter of 01/05/20   XR chest 2 views    Narrative CHEST     INDICATION:   Shortness of breath  COMPARISON:  1/7/2018, CT chest 10/23/2017    EXAM PERFORMED/VIEWS:  XR CHEST AP & LATERAL    FINDINGS:  Study limited by patient body habitus  Cardiomediastinal silhouette appears stable  Left chest wall pacer  Question mild central vascular congestion  Slight hazy opacities in the lung bases on the AP projection could be related to superimposed soft tissue  No convincing infiltrates on the lateral view  Question trace left pleural effusion  No pneumothorax  Degenerative changes of the spine and left shoulder  Impression Question mild central vascular congestion and trace left pleural effusion          Workstation performed: [id number]         Last 24 Hours Medication List:     Current Facility-Administered Medications:  acetaminophen 500 mg Oral Q8H PRN Franklin Scales MD   apixaban 5 mg Oral BID Franklin Scales MD   atorvastatin 20 mg Oral Daily With Laura Fuller MD   cholecalciferol 2,000 Units Oral Daily Franklin Scales MD   furosemide 80 mg Intravenous BID (diuretic) Poppy Gamez MD   ipratropium-albuterol 3 mL Nebulization Q6H Franklin Scales MD   magnesium oxide 400 mg Oral BID Franklin Scales MD   metoprolol tartrate 25 mg Oral BID Franklin Scales MD oxybutynin 10 mg Oral Daily Janae Sanchez MD   senna-docusate sodium 1 tablet Oral PRN Janae Sanchez MD        Today, Patient Was Seen By: Irvin Gutierrez MD    ** Please Note: Dictation voice to text software may have been used in the creation of this document   **

## 2020-01-06 NOTE — PROGRESS NOTES
Pastoral Care Progress Note    2020  Patient: Daylin Ricks : 1929  Admission Date & Time: 2020 1113  MRN: [id number] [facility]: [id number]                     Chaplaincy Interventions Utilized:   Empowerment: Encouraged focus on present and Provided chaplaincy education    Exploration: Explored emotional needs & resources, Explored relational needs & resources, Explored spiritual needs & resources and Facilitated story telling    Relationship Building: Cultivated a relationship of care and support, Listened empathically, Hospitality and Provided silent and supportive presence    Ritual: Provided prayer    haplaincy Outcomes Achieved:  Expressed gratitude, Expressed peace and Identified meaningful connections    Spiritual Coping Strategies Utilized:   Connectedness and Spiritual comfort       [address]    Spiritual Beliefs/Perceptions   Concept of God Accepting   Relationship with God Close   Unmet Spiritual Needs   (None reported )   Support Systems Children;Family members   Stress Factors   Patient Stress Factors None identified   Coping Responses   Patient Coping Accepting   Plan of Care   Assessment Completed by: Unit visit

## 2020-01-06 NOTE — PLAN OF CARE
Problem: OCCUPATIONAL THERAPY ADULT  Goal: Performs self-care activities at highest level of function for planned discharge setting  See evaluation for individualized goals  Description  Treatment Interventions: ADL retraining, Functional transfer training, Endurance training, Cognitive reorientation, Patient/family training, Equipment evaluation/education, Compensatory technique education, Energy conservation          See flowsheet documentation for full assessment, interventions and recommendations  Note:   Limitation: Decreased ADL status, Decreased Safe judgement during ADL, Decreased cognition, Decreased endurance, Decreased self-care trans, Decreased high-level ADLs  Prognosis: Good  Assessment: Patient participated in Skilled OT session this date with interventions consisting of safety awareness and fall prevention techniques,  therapeutic activities to: increase activity tolerance, increase dynamic sit/ stand balance during functional activity , increase postural control and increase OOB/ sitting tolerance   Patient agreeable to OT treatment session, upon arrival patient was found supine in bed  Treatment session as follows: grooming activity with supervision, bed mobility with mod A x 2, sit<>stand with mod A x 2 with RW, and bed to chair transfer with mod A x 2 with RW (pt required frequent VC to maintain appropriate body mechanics t/o as pt tends to "hunch over"  Patient continues to be functioning below baseline level, occupational performance remains limited secondary to factors listed above and increased risk for falls and injury  From OT standpoint, recommendation at time of d/c would be Short Term Rehab  Patient to benefit from continued Occupational Therapy treatment while in the hospital to address deficits as defined above and maximize level of functional independence with ADLs and functional mobility   Pt left seated in bedside recliner, call bell in reach, tray table in reach, needs met, chair alarm activated, SCDs on  RN Elias Peter aware  OT Discharge Recommendation: Short Term Rehab  OT - OK to Discharge:  Yes

## 2020-01-06 NOTE — OCCUPATIONAL THERAPY NOTE
Occupational Therapy Treatment Note     Patient Name: Aurora Corbin  HGUWU'I Date: 1/6/2020  Problem List  Principal Problem:    Acute on chronic diastolic congestive heart failure ([facility] [address] )  Active Problems:    Hyperlipidemia    [facility]    Pacemaker    HTN (hypertension)          01/06/20 0926   Restrictions/Precautions   Weight Bearing Precautions Per Order No   Other Precautions Chair Alarm; Bed Alarm;Cognitive; Fall Risk   Pain Assessment   Pain Assessment No/denies pain   Pain Score No Pain   ADL   Grooming Assistance 5  Supervision/Setup   Grooming Deficit Verbal cueing; Increased time to complete;Denture care   Bed Mobility   Rolling R 3  Moderate assistance   Additional items Assist x 2;Verbal cues   Rolling L 3  Moderate assistance   Additional items Assist x 2;Verbal cues   Supine to Sit 3  Moderate assistance   Additional items Assist x 2;Verbal cues; Bedrails   Transfers   Sit to Stand 3  Moderate assistance   Additional items Assist x 2;Verbal cues  (RW)   Stand to Sit 3  Moderate assistance   Additional items Assist x 2;Verbal cues  (RW)   Stand pivot 3  Moderate assistance   Additional items Assist x 2;Verbal cues  (RW)   Cognition   Overall Cognitive Status Impaired   Arousal/Participation Alert   Attention Attends with cues to redirect   Orientation Level Oriented to person;Disoriented to place; Disoriented to time;Disoriented to situation   Memory Decreased short term memory;Decreased recall of recent events;Decreased recall of precautions   Following Commands Follows one step commands with increased time or repetition   Assessment   Assessment Patient participated in Skilled OT session this date with interventions consisting of safety awareness and fall prevention techniques,  therapeutic activities to: increase activity tolerance, increase dynamic sit/ stand balance during functional activity , increase postural control and increase OOB/ sitting tolerance    Patient agreeable to OT treatment session, upon arrival patient was found supine in bed  Treatment session as follows: grooming activity with supervision, bed mobility with mod A x 2, sit<>stand with mod A x 2 with RW, and bed to chair transfer with mod A x 2 with RW (pt required frequent VC to maintain appropriate body mechanics t/o as pt tends to "hunch over"  Patient continues to be functioning below baseline level, occupational performance remains limited secondary to factors listed above and increased risk for falls and injury  From OT standpoint, recommendation at time of d/c would be Short Term Rehab  Patient to benefit from continued Occupational Therapy treatment while in the hospital to address deficits as defined above and maximize level of functional independence with ADLs and functional mobility  Pt left seated in bedside recliner, call bell in reach, tray table in reach, needs met, chair alarm activated, SCDs on  RN Kingsley Si aware  Plan   Treatment Interventions ADL retraining;Functional transfer training; Endurance training;Cognitive reorientation;Patient/family training;Continued evaluation; Energy conservation; Compensatory technique education   Goal Expiration Date 01/15/20   OT Treatment Day 1   OT Frequency 2-3x/wk   Recommendation   OT Discharge Recommendation Short Term Rehab     LYNDSEY Herman/L

## 2020-01-06 NOTE — PLAN OF CARE
Problem: Potential for Falls  Goal: Patient will remain free of falls  Description  INTERVENTIONS:  - Assess patient frequently for physical needs  -  Identify cognitive and physical deficits and behaviors that affect risk of falls    -  [place] fall precautions as indicated by assessment   - Educate patient/family on patient safety including physical limitations  - Instruct patient to call for assistance with activity based on assessment  - Modify environment to reduce risk of injury  - Consider OT/PT consult to assist with strengthening/mobility  Outcome: Progressing     Problem: Prexisting or High Potential for Compromised Skin Integrity  Goal: Skin integrity is maintained or improved  Description  INTERVENTIONS:  - Identify patients at risk for skin breakdown  - Assess and monitor skin integrity  - Assess and monitor nutrition and hydration status  - Monitor labs   - Assess for incontinence   - Turn and reposition patient  - Assist with mobility/ambulation  - Relieve pressure over bony prominences  - Avoid friction and shearing  - Provide appropriate hygiene as needed including keeping skin clean and dry  - Evaluate need for skin moisturizer/barrier cream  - Collaborate with interdisciplinary team   - Patient/family teaching  - Consider wound care consult   Outcome: Progressing     Problem: RESPIRATORY - ADULT  Goal: Achieves optimal ventilation and oxygenation  Description  INTERVENTIONS:  - Assess for changes in respiratory status  - Assess for changes in mentation and behavior  - Position to facilitate oxygenation and minimize respiratory effort  - Oxygen administered by appropriate delivery if ordered  - Initiate smoking cessation education as indicated  - Encourage broncho-pulmonary hygiene including cough, deep breathe, Incentive Spirometry  - Assess the need for suctioning and aspirate as needed  - Assess and instruct to report SOB or any respiratory difficulty  - Respiratory Therapy support as indicated  Outcome: Progressing     Problem: PAIN - ADULT  Goal: Verbalizes/displays adequate comfort level or baseline comfort level  Description  Interventions:  - Encourage patient to monitor pain and request assistance  - Assess pain using appropriate pain scale  - Administer analgesics based on type and severity of pain and evaluate response  - Implement non-pharmacological measures as appropriate and evaluate response  - Consider cultural and social influences on pain and pain management  - Notify physician/advanced practitioner if interventions unsuccessful or patient reports new pain  Outcome: Progressing     Problem: SAFETY ADULT  Goal: Maintain or return to baseline ADL function  Description  INTERVENTIONS:  -  Assess patient's ability to carry out ADLs; assess patient's baseline for ADL function and identify physical deficits which impact ability to perform ADLs (bathing, care of mouth/teeth, toileting, grooming, dressing, etc )  - Assess/evaluate cause of self-care deficits   - Assess range of motion  - Assess patient's mobility; develop plan if impaired  - Assess patient's need for assistive devices and provide as appropriate  - Encourage maximum independence but intervene and supervise when necessary  - Involve family in performance of ADLs  - Assess for home care needs following discharge   - Consider OT consult to assist with ADL evaluation and planning for discharge  - Provide patient education as appropriate  Outcome: Progressing  Goal: Maintain or return mobility status to optimal level  Description  INTERVENTIONS:  - Assess patient's baseline mobility status (ambulation, transfers, stairs, etc )    - Identify cognitive and physical deficits and behaviors that affect mobility  - Identify mobility aids required to assist with transfers and/or ambulation (gait belt, sit-to-stand, lift, walker, cane, etc )  - [place] fall precautions as indicated by assessment  - Record patient progress and toleration of activity level on Mobility SBAR; progress patient to next Phase/Stage  - Instruct patient to call for assistance with activity based on assessment  - Consider rehabilitation consult to assist with strengthening/weightbearing, etc   Outcome: Progressing     Problem: DISCHARGE PLANNING  Goal: Discharge to home or other facility with appropriate resources  Description  INTERVENTIONS:  - Identify barriers to discharge w/patient and caregiver  - Arrange for needed discharge resources and transportation as appropriate  - Identify discharge learning needs (meds, wound care, etc )  - Arrange for interpretive services to assist at discharge as needed  - Refer to Case Management Department for coordinating discharge planning if the patient needs post-hospital services based on physician/advanced practitioner order or complex needs related to functional status, cognitive ability, or social support system  Outcome: Progressing     Problem: Knowledge Deficit  Goal: Patient/family/caregiver demonstrates understanding of disease process, treatment plan, medications, and discharge instructions  Description  Complete learning assessment and assess knowledge base    Interventions:  - Provide teaching at level of understanding  - Provide teaching via preferred learning methods  Outcome: Progressing

## 2020-01-06 NOTE — CONSULTS
Consultation - Cardiology   Juli Foley Sequoyah 80 y o  female MRN: [id number]  Unit/Bed#: -01 Encounter: [id number]    Assessment/Plan     Assessment:    Acute on Chronic Diastolic CHF: She is clinically decompensated with pulmonary edema  Continue with IV diuretics as tolerated  Check standing weights if able  Trend I/O, etc      Lauren Irma  Atrial FIbrillation: Continue eliquis and metoprolol  History of PPM implant  Rates are stable  History of Present Illness   Physician Requesting Consult: Yoli Sherwood MD  Reason for Consult / Principal Problem: CHF  HPI: Sienna Pena is a 80y o  year old female who presents with shortness of breath    She has a history of Chronic Diastolic CHF, Permanent Atrial Fibrillation, s/p PPM implant  She [facility] with [facility]  SHe currently is resting comfortably  SHe denies any chest pain, dyspnea or palpitations at rest  SHe is a limited historian due to dementia  Inpatient consult to Cardiology  Consult performed by: Javier Soliz MD  Consult ordered by: Tin Chau MD          Review of Systems   Constitutional: Positive for fatigue  HENT: Negative  Eyes: Negative  Respiratory: Positive for choking and shortness of breath  Cardiovascular: Negative  Gastrointestinal: Negative  Endocrine: Negative  Genitourinary: Negative  Musculoskeletal: Negative  Skin: Negative  Allergic/Immunologic: Negative  Neurological: Negative  Hematological: Negative  Psychiatric/Behavioral: Negative          Historical Information   Past Medical History:   Diagnosis Date    Amnesia     Cardiac disease     [facility]     Coronary artery disease     Dementia (HCC)     D[facility]     Disease of thyroid gland     Edema     Hearing loss     Hyperlipidemia     Nocturia     Osteoarthritis     Osteoporosis     Otosclerosis     Pacemaker     Renal disorder     Vitamin D deficiency      Past Surgical History:   Procedure Laterality Date    CARDIAC PACEMAKER PLACEMENT      JOINT REPLACEMENT       Social History     Substance and Sexual Activity   Alcohol Use Yes    Alcohol/week: 7 0 standard drinks    Types: 7 Glasses of wine per week     Social History     Substance and Sexual Activity   Drug Use No     Social History     Tobacco Use   Smoking Status Never Smoker   Smokeless Tobacco Never Used     Family History: History reviewed  No pertinent family history  Meds/Allergies   current meds:   Current Facility-Administered Medications   Medication Dose Route Frequency    acetaminophen (TYLENOL) tablet 500 mg  500 mg Oral Q8H PRN    apixaban (ELIQUIS) tablet 5 mg  5 mg Oral BID    atorvastatin (LIPITOR) tablet 20 mg  20 mg Oral Daily With Dinner    cholecalciferol (VITAMIN D3) tablet 2,000 Units  2,000 Units Oral Daily    furosemide (LASIX) injection 80 mg  80 mg Intravenous BID (diuretic)    ipratropium-albuterol (DUO-NEB) 0 5-2 5 mg/3 mL inhalation solution 3 mL  3 mL Nebulization Q6H    magnesium oxide (MAG-OX) tablet 400 mg  400 mg Oral BID    metoprolol tartrate (LOPRESSOR) tablet 25 mg  25 mg Oral BID    oxybutynin (DITROPAN-XL) 24 hr tablet 10 mg  10 mg Oral Daily    senna-docusate sodium (SENOKOT S) 8 6-50 mg per tablet 1 tablet  1 tablet Oral PRN     No Known Allergies    Objective   Vitals: Blood pressure 118/50, pulse 71, temperature 98 9 °F (37 2 °C), resp  rate 12, weight 98 1 kg (216 lb 4 3 oz), SpO2 95 %, not currently breastfeeding    Orthostatic Blood Pressures      Most Recent Value   Blood Pressure  118/50 filed at 01/06/2020 1861   Patient Position - Orthostatic VS  Sitting filed at 01/05/2020 1125          No intake or output data in the 24 hours ending 01/06/20 0747    Invasive Devices     Peripheral Intravenous Line            Peripheral IV 01/05/20 Right Antecubital less than 1 day          Drain            External Urinary Catheter less than 1 day Physical Exam   Constitutional: She is oriented to person, place, and time  No distress  HENT:   Mouth/Throat: No oropharyngeal exudate  Eyes: No scleral icterus  Neck: JVD present  Cardiovascular: Normal rate and regular rhythm  Pulmonary/Chest: Effort normal  She has rales  Abdominal: Soft  She exhibits no distension  There is no tenderness  Musculoskeletal: She exhibits no edema  Neurological: She is alert and oriented to person, place, and time  Skin: Skin is warm and dry  She is not diaphoretic  Psychiatric: She has a normal mood and affect  Her behavior is normal        Lab Results:   I have personally reviewed pertinent lab results      CBC with diff:   Results from last 7 days   Lab Units 01/06/20  0527   WBC Thousand/uL 6 65   RBC Million/uL 4 53   HEMOGLOBIN g/dL 13 9   HEMATOCRIT % 42 5   MCV fL 94   MCH pg 30 7   MCHC g/dL 32 7   RDW % 14 7   MPV fL 10 5   PLATELETS Thousands/uL 218     CMP:   Results from last 7 days   Lab Units 01/06/20  0527 01/05/20  1128   SODIUM mmol/L 139 135*   POTASSIUM mmol/L 4 2 4 4   CHLORIDE mmol/L 100 101   CO2 mmol/L 27 28   BUN mg/dL 18 15   CREATININE mg/dL 1 06 1 01   CALCIUM mg/dL 8 7 9 4   AST U/L  --  19   ALT U/L  --  22   ALK PHOS U/L  --  86   EGFR ml/min/1 73sq m 46 49     Troponin:   0   Lab Value Date/Time    TROPONINI <0 02 01/05/2020 1128    TROPONINI <0 02 10/23/2017 0627     BNP:   Results from last 7 days   Lab Units 01/06/20  0527   POTASSIUM mmol/L 4 2   CHLORIDE mmol/L 100   CO2 mmol/L 27   BUN mg/dL 18   CREATININE mg/dL 1 06   CALCIUM mg/dL 8 7   EGFR ml/min/1 73sq m 46     Coags:     TSH:     Magnesium:   Results from last 7 days   Lab Units 01/06/20  0527   MAGNESIUM mg/dL 2 3     Lipid Profile:     Imaging: I have personally reviewed pertinent films in PACS  EKG: Atrial Fibrillation with Ventricular Pacing      Code Status: Level 3 - DNAR and DNI  Advance Directive and Living Will: Yes    Power of :    POLST: Counseling / Coordination of Care  Total floor / unit time spent today 45 minutes  Greater than 50% of total time was spent with the patient and / or family counseling and / or coordination of care  A description of the counseling / coordination of care

## 2020-01-07 LAB
ANION GAP SERPL CALCULATED.3IONS-SCNC: 6 MMOL/L (ref 4–13)
BASOPHILS # BLD AUTO: 0.04 THOUSANDS/ΜL (ref 0–0.1)
BASOPHILS NFR BLD AUTO: 1 % (ref 0–1)
BUN SERPL-MCNC: 21 MG/DL (ref 5–25)
CALCIUM SERPL-MCNC: 8.7 MG/DL (ref 8.3–10.1)
CHLORIDE SERPL-SCNC: 101 MMOL/L (ref 100–108)
CO2 SERPL-SCNC: 29 MMOL/L (ref 21–32)
CREAT SERPL-MCNC: 0.96 MG/DL (ref 0.6–1.3)
EOSINOPHIL # BLD AUTO: 0.1 THOUSAND/ΜL (ref 0–0.61)
EOSINOPHIL NFR BLD AUTO: 2 % (ref 0–6)
ERYTHROCYTE [DISTWIDTH] IN BLOOD BY AUTOMATED COUNT: 13.6 % (ref 11.6–15.1)
GFR SERPL CREATININE-BSD FRML MDRD: 52 ML/MIN/1.73SQ M
GLUCOSE SERPL-MCNC: 98 MG/DL (ref 65–140)
HCT VFR BLD AUTO: 41.9 % (ref 34.8–46.1)
HGB BLD-MCNC: 14 G/DL (ref 11.5–15.4)
IMM GRANULOCYTES # BLD AUTO: 0.01 THOUSAND/UL (ref 0–0.2)
IMM GRANULOCYTES NFR BLD AUTO: 0 % (ref 0–2)
LYMPHOCYTES # BLD AUTO: 1.25 THOUSANDS/ΜL (ref 0.6–4.47)
LYMPHOCYTES NFR BLD AUTO: 23 % (ref 14–44)
MAGNESIUM SERPL-MCNC: 2.4 MG/DL (ref 1.6–2.6)
MCH RBC QN AUTO: 31.2 PG (ref 26.8–34.3)
MCHC RBC AUTO-ENTMCNC: 33.4 G/DL (ref 31.4–37.4)
MCV RBC AUTO: 93 FL (ref 82–98)
MONOCYTES # BLD AUTO: 0.88 THOUSAND/ΜL (ref 0.17–1.22)
MONOCYTES NFR BLD AUTO: 16 % (ref 4–12)
NEUTROPHILS # BLD AUTO: 3.15 THOUSANDS/ΜL (ref 1.85–7.62)
NEUTS SEG NFR BLD AUTO: 58 % (ref 43–75)
NRBC BLD AUTO-RTO: 0 /100 WBCS
PLATELET # BLD AUTO: 186 THOUSANDS/UL (ref 149–390)
PMV BLD AUTO: 10.4 FL (ref 8.9–12.7)
POTASSIUM SERPL-SCNC: 3.5 MMOL/L (ref 3.5–5.3)
RBC # BLD AUTO: 4.49 MILLION/UL (ref 3.81–5.12)
SODIUM SERPL-SCNC: 136 MMOL/L (ref 136–145)
WBC # BLD AUTO: 5.43 THOUSAND/UL (ref 4.31–10.16)

## 2020-01-07 PROCEDURE — 99232 SBSQ HOSP IP/OBS MODERATE 35: CPT | Performed by: PHYSICIAN ASSISTANT

## 2020-01-07 PROCEDURE — 83735 ASSAY OF MAGNESIUM: CPT | Performed by: INTERNAL MEDICINE

## 2020-01-07 PROCEDURE — 85025 COMPLETE CBC W/AUTO DIFF WBC: CPT | Performed by: INTERNAL MEDICINE

## 2020-01-07 PROCEDURE — 94760 N-INVAS EAR/PLS OXIMETRY 1: CPT

## 2020-01-07 PROCEDURE — 94640 AIRWAY INHALATION TREATMENT: CPT

## 2020-01-07 PROCEDURE — 80048 BASIC METABOLIC PNL TOTAL CA: CPT | Performed by: INTERNAL MEDICINE

## 2020-01-07 RX ORDER — ALBUTEROL SULFATE 2.5 MG/3ML
2.5 SOLUTION RESPIRATORY (INHALATION) EVERY 6 HOURS PRN
Status: DISCONTINUED | OUTPATIENT
Start: 2020-01-07 | End: 2020-01-09 | Stop reason: HOSPADM

## 2020-01-07 RX ORDER — IPRATROPIUM BROMIDE AND ALBUTEROL SULFATE 2.5; .5 MG/3ML; MG/3ML
3 SOLUTION RESPIRATORY (INHALATION)
Status: DISCONTINUED | OUTPATIENT
Start: 2020-01-07 | End: 2020-01-09 | Stop reason: HOSPADM

## 2020-01-07 RX ADMIN — IPRATROPIUM BROMIDE AND ALBUTEROL SULFATE 3 ML: 2.5; .5 SOLUTION RESPIRATORY (INHALATION) at 01:20

## 2020-01-07 RX ADMIN — IPRATROPIUM BROMIDE AND ALBUTEROL SULFATE 3 ML: 2.5; .5 SOLUTION RESPIRATORY (INHALATION) at 08:26

## 2020-01-07 RX ADMIN — QUETIAPINE FUMARATE 25 MG: 25 TABLET ORAL at 00:14

## 2020-01-07 RX ADMIN — APIXABAN 5 MG: 5 TABLET, FILM COATED ORAL at 09:13

## 2020-01-07 RX ADMIN — Medication 400 MG: at 09:13

## 2020-01-07 RX ADMIN — FUROSEMIDE 80 MG: 10 INJECTION, SOLUTION INTRAMUSCULAR; INTRAVENOUS at 17:57

## 2020-01-07 RX ADMIN — Medication 400 MG: at 17:56

## 2020-01-07 RX ADMIN — OXYBUTYNIN CHLORIDE 10 MG: 5 TABLET, EXTENDED RELEASE ORAL at 09:13

## 2020-01-07 RX ADMIN — IPRATROPIUM BROMIDE AND ALBUTEROL SULFATE 3 ML: 2.5; .5 SOLUTION RESPIRATORY (INHALATION) at 19:48

## 2020-01-07 RX ADMIN — METOPROLOL TARTRATE 25 MG: 25 TABLET ORAL at 17:56

## 2020-01-07 RX ADMIN — IPRATROPIUM BROMIDE AND ALBUTEROL SULFATE 3 ML: 2.5; .5 SOLUTION RESPIRATORY (INHALATION) at 13:17

## 2020-01-07 RX ADMIN — MELATONIN 2000 UNITS: at 09:13

## 2020-01-07 RX ADMIN — METOPROLOL TARTRATE 25 MG: 25 TABLET ORAL at 09:13

## 2020-01-07 RX ADMIN — FUROSEMIDE 80 MG: 10 INJECTION, SOLUTION INTRAMUSCULAR; INTRAVENOUS at 09:14

## 2020-01-07 RX ADMIN — APIXABAN 5 MG: 5 TABLET, FILM COATED ORAL at 17:57

## 2020-01-07 RX ADMIN — ATORVASTATIN CALCIUM 20 MG: 20 TABLET, FILM COATED ORAL at 18:11

## 2020-01-07 NOTE — PROGRESS NOTES
[facility]  Progress Note Alejandro Veras 5/16/1929, 80 y o  female MRN: [id number]  Unit/Bed#: -01 Encounter: [id number]  Primary Care Provider: [place] Botello   Date and time admitted to hospital: 1/5/2020 11:13 AM    * Acute on chronic diastolic congestive heart failure ([facility] )  Assessment & Plan  Wt Readings from Last 3 Encounters:   01/07/20 97 5 kg (214 lb 15 2 oz)   01/07/18 93 4 kg (206 lb)   11/01/17 92 9 kg (204 lb 12 9 oz)       Acute on chronic diastolic CHF, present on admission as evidenced by shortness of breath, cough, elevated proBNP 2303, chest x-ray finding of pulmonary edema  Last ECHO (10/10/17) at / [facility]- EF 60%, mild Aortic stenosis    · Continue diuresis with Lasix 80 mg IV twice daily  · Recorded weight in the ED - 218lbs  · Today 214  · Last recorded weight 11/1/17 - 204lbs  · Daily weight and I&Os  · Strict Is/Os- [place] cathter as patient is incontinent  · Repeat ECHO ordered this admission, pending  · Cardiology consulted  · Currently on 2L NC, keep O2 sats > 92% with supplemental oxygen as needed  · Continue with incentive spirometry, encourage out of bed to chair  · DuoNeb inhalers q i d   · P T /OT    Pacemaker  Assessment & Plan  · Status post pacemaker insertion    A-fib ([facility] )  Assessment & Plan  · History of Afib  · EKG shows rate controlled Afib  · Continue with home medications Lopressor 25 mg twice daily and Eliquis    Hyperlipidemia  Assessment & Plan  · History of hyperlipidemia on atorvastatin 20 mg daily      VTE Pharmacologic Prophylaxis:   Pharmacologic: Apixaban (Eliquis)  Mechanical VTE Prophylaxis in Place: Yes    Patient Centered Rounds: I have performed bedside rounds with nursing staff today  Discussions with Specialists or Other Care Team Provider: Discussed with nursing, attending and case management    Education and Discussions with Family / Patient:  Discussed care plan with patient and patient's daughter at bedside  Answered all questions to the best my ability  Educated patient's daughter on hypoxia affecting cognition  Time Spent for Care: 30 minutes  More than 50% of total time spent on counseling and coordination of care as described above  Current Length of Stay: 2 day(s)    Current Patient Status: Inpatient   Certification Statement: The patient will continue to require additional inpatient hospital stay due to IV Diuresis, on supplemental oxygen    Discharge Plan:  Patient comes from nursing home, patient will likely need rehab upon discharge  Await final PT/OT recommendations after improvement with IV diuresis  Anticipate 48-72 hours prior to discharge  Code Status: Level 3 - DNAR and DNI      Subjective:   Patient is sleepy on exam   Denies pain, shortness of breath or swelling  Daughter reports she is mildly confused  Objective:     Vitals:   Temp (24hrs), Av 5 °F (36 9 °C), Min:98 °F (36 7 °C), Max:99 °F (37 2 °C)    Temp:  [98 °F (36 7 °C)-99 °F (37 2 °C)] 98 °F (36 7 °C)  HR:  [56-78] 56  Resp:  [18-20] 20  BP: (122-130)/(59-60) 124/59  SpO2:  [90 %-94 %] 91 %  Body mass index is 35 77 kg/m²  Input and Output Summary (last 24 hours): Intake/Output Summary (Last 24 hours) at 2020 1147  Last data filed at 2020 1750  Gross per 24 hour   Intake    Output 653 ml   Net -653 ml       Physical Exam:     Physical Exam   Constitutional: She appears well-developed and well-nourished  No distress  HENT:   Head: Normocephalic and atraumatic  Eyes: Conjunctivae are normal  No scleral icterus  Cardiovascular: Normal rate and regular rhythm  No murmur heard  Pulmonary/Chest: Effort normal  No respiratory distress  She has decreased breath sounds in the right lower field and the left lower field  She has no wheezes  She has no rales  Abdominal: Soft  She exhibits no distension  There is no tenderness  Musculoskeletal: She exhibits no edema  Neurological: She is alert     Skin: Skin is warm and dry  No erythema  Psychiatric: She has a normal mood and affect  Alert and oriented x2   Nursing note and vitals reviewed  Additional Data:     Labs:    Results from last 7 days   Lab Units 01/07/20  0544   WBC Thousand/uL 5 43   HEMOGLOBIN g/dL 14 0   HEMATOCRIT % 41 9   PLATELETS Thousands/uL 186   NEUTROS PCT % 58   LYMPHS PCT % 23   MONOS PCT % 16*   EOS PCT % 2     Results from last 7 days   Lab Units 01/07/20  0544  01/05/20  1128   SODIUM mmol/L 136   < > 135*   POTASSIUM mmol/L 3 5   < > 4 4   CHLORIDE mmol/L 101   < > 101   CO2 mmol/L 29   < > 28   BUN mg/dL 21   < > 15   CREATININE mg/dL 0 96   < > 1 01   ANION GAP mmol/L 6   < > 6   CALCIUM mg/dL 8 7   < > 9 4   ALBUMIN g/dL  --   --  3 2*   TOTAL BILIRUBIN mg/dL  --   --  1 40*   ALK PHOS U/L  --   --  86   ALT U/L  --   --  22   AST U/L  --   --  19   GLUCOSE RANDOM mg/dL 98   < > 106    < > = values in this interval not displayed  Results from last 7 days   Lab Units 01/05/20  1128   PROCALCITONIN ng/ml <0 05           * I Have Reviewed All Lab Data Listed Above  * Additional Pertinent Lab Tests Reviewed:  All Labs Within Last 24 Hours Reviewed    Imaging:    Imaging Reports Reviewed Today Include:   · CXR 1/5:  Question mild central vascular congestion and trace left pleural effusion  Imaging Personally Reviewed by Myself Includes:    · CXR 1/5:  Cardiomegaly, pulmonary vascular congestion    Recent Cultures (last 7 days):           Last 24 Hours Medication List:     Current Facility-Administered Medications:  acetaminophen 650 mg Oral Q8H PRN Tonio Ramos MD   apixaban 5 mg Oral BID Lee Medina MD   atorvastatin 20 mg Oral Daily With Pako Araujo MD   cholecalciferol 2,000 Units Oral Daily Lee Medina MD   furosemide 80 mg Intravenous BID (diuretic) Tonio Ramos MD   ipratropium-albuterol 3 mL Nebulization Q6H Lee Medina MD   magnesium oxide 400 mg Oral BID Danni King MD   metoprolol tartrate 25 mg Oral BID Danni King MD   oxybutynin 10 mg Oral Daily Danni King MD   senna-docusate sodium 1 tablet Oral PRN Danni King MD        Today, Patient Was Seen By: Carol Saavedra PA-C    ** Please Note: Dictation voice to text software may have been used in the creation of this document   **

## 2020-01-07 NOTE — ASSESSMENT & PLAN NOTE
Wt Readings from Last 3 Encounters:   01/07/20 97 5 kg (214 lb 15 2 oz)   01/07/18 93 4 kg (206 lb)   11/01/17 92 9 kg (204 lb 12 9 oz)       Acute on chronic diastolic CHF, present on admission as evidenced by shortness of breath, cough, elevated proBNP 2303, chest x-ray finding of pulmonary edema  Last E[facility] (10/10/17) at [facility]- EF 60%, mild Aortic stenosis    · Continue diuresis with Lasix 80 mg IV twice daily  · Recorded weight in the ED - 218lbs  · Today 214  · Last recorded weight 11/1/17 - 204lbs  · Daily weight and I&Os  · [facility]  · Repeat ECHO ordered this admission, pending  · Cardiology consulted  · Currently on 2L NC, keep O2 sats > 92% with supplemental oxygen as needed  · Continue with incentive spirometry, encourage out of bed to chair  · DuoNeb inhalers q i d   · P T /OT

## 2020-01-07 NOTE — PLAN OF CARE
Problem: Potential for Falls  Goal: Patient will remain free of falls  Description  INTERVENTIONS:  - Assess patient frequently for physical needs  -  Identify cognitive and physical deficits and behaviors that affect risk of falls    -  [place] fall precautions as indicated by assessment   - Educate patient/family on patient safety including physical limitations  - Instruct patient to call for assistance with activity based on assessment  - Modify environment to reduce risk of injury  - Consider OT/PT consult to assist with strengthening/mobility  Outcome: Progressing

## 2020-01-07 NOTE — PLAN OF CARE
Problem: Potential for Falls  Goal: Patient will remain free of falls  Description  INTERVENTIONS:  - Assess patient frequently for physical needs  -  Identify cognitive and physical deficits and behaviors that affect risk of falls    -  [place] fall precautions as indicated by assessment   - Educate patient/family on patient safety including physical limitations  - Instruct patient to call for assistance with activity based on assessment  - Modify environment to reduce risk of injury  - Consider OT/PT consult to assist with strengthening/mobility  Outcome: Progressing     Problem: Prexisting or High Potential for Compromised Skin Integrity  Goal: Skin integrity is maintained or improved  Description  INTERVENTIONS:  - Identify patients at risk for skin breakdown  - Assess and monitor skin integrity  - Assess and monitor nutrition and hydration status  - Monitor labs   - Assess for incontinence   - Turn and reposition patient  - Assist with mobility/ambulation  - Relieve pressure over bony prominences  - Avoid friction and shearing  - Provide appropriate hygiene as needed including keeping skin clean and dry  - Evaluate need for skin moisturizer/barrier cream  - Collaborate with interdisciplinary team   - Patient/family teaching  - Consider wound care consult   Outcome: Progressing     Problem: RESPIRATORY - ADULT  Goal: Achieves optimal ventilation and oxygenation  Description  INTERVENTIONS:  - Assess for changes in respiratory status  - Assess for changes in mentation and behavior  - Position to facilitate oxygenation and minimize respiratory effort  - Oxygen administered by appropriate delivery if ordered  - Initiate smoking cessation education as indicated  - Encourage broncho-pulmonary hygiene including cough, deep breathe, Incentive Spirometry  - Assess the need for suctioning and aspirate as needed  - Assess and instruct to report SOB or any respiratory difficulty  - Respiratory Therapy support as indicated  Outcome: Progressing     Problem: PAIN - ADULT  Goal: Verbalizes/displays adequate comfort level or baseline comfort level  Description  Interventions:  - Encourage patient to monitor pain and request assistance  - Assess pain using appropriate pain scale  - Administer analgesics based on type and severity of pain and evaluate response  - Implement non-pharmacological measures as appropriate and evaluate response  - Consider cultural and social influences on pain and pain management  - Notify physician/advanced practitioner if interventions unsuccessful or patient reports new pain  Outcome: Progressing     Problem: SAFETY ADULT  Goal: Maintain or return to baseline ADL function  Description  INTERVENTIONS:  -  Assess patient's ability to carry out ADLs; assess patient's baseline for ADL function and identify physical deficits which impact ability to perform ADLs (bathing, care of mouth/teeth, toileting, grooming, dressing, etc )  - Assess/evaluate cause of self-care deficits   - Assess range of motion  - Assess patient's mobility; develop plan if impaired  - Assess patient's need for assistive devices and provide as appropriate  - Encourage maximum independence but intervene and supervise when necessary  - Involve family in performance of ADLs  - Assess for home care needs following discharge   - Consider OT consult to assist with ADL evaluation and planning for discharge  - Provide patient education as appropriate  Outcome: Progressing  Goal: Maintain or return mobility status to optimal level  Description  INTERVENTIONS:  - Assess patient's baseline mobility status (ambulation, transfers, stairs, etc )    - Identify cognitive and physical deficits and behaviors that affect mobility  - Identify mobility aids required to assist with transfers and/or ambulation (gait belt, sit-to-stand, lift, walker, cane, etc )  - [place] fall precautions as indicated by assessment  - Record patient progress and toleration of activity level on Mobility SBAR; progress patient to next Phase/Stage  - Instruct patient to call for assistance with activity based on assessment  - Consider rehabilitation consult to assist with strengthening/weightbearing, etc   Outcome: Progressing     Problem: DISCHARGE PLANNING  Goal: Discharge to home or other facility with appropriate resources  Description  INTERVENTIONS:  - Identify barriers to discharge w/patient and caregiver  - Arrange for needed discharge resources and transportation as appropriate  - Identify discharge learning needs (meds, wound care, etc )  - Arrange for interpretive services to assist at discharge as needed  - Refer to Case Management Department for coordinating discharge planning if the patient needs post-hospital services based on physician/advanced practitioner order or complex needs related to functional status, cognitive ability, or social support system  Outcome: Progressing     Problem: Knowledge Deficit  Goal: Patient/family/caregiver demonstrates understanding of disease process, treatment plan, medications, and discharge instructions  Description  Complete learning assessment and assess knowledge base  Interventions:  - Provide teaching at level of understanding  - Provide teaching via preferred learning methods  Outcome: Progressing     Problem: Nutrition/Hydration-ADULT  Goal: Nutrient/Hydration intake appropriate for improving, restoring or maintaining nutritional needs  Description  Monitor and assess patient's nutrition/hydration status for malnutrition  Collaborate with interdisciplinary team and initiate plan and interventions as ordered  Monitor patient's weight and dietary intake as ordered or per policy  Utilize nutrition screening tool and intervene as necessary  Determine patient's food preferences and provide high-protein, high-caloric foods as appropriate       INTERVENTIONS:  - Monitor oral intake, urinary output, labs, and treatment plans  - Assess nutrition and hydration status and recommend course of action  - Evaluate amount of meals eaten  - Assist patient with eating if necessary   - Allow adequate time for meals  - Recommend/ encourage appropriate diets, oral nutritional supplements, and vitamin/mineral supplements  - Order, calculate, and assess calorie counts as needed  - Recommend, monitor, and adjust tube feedings and TPN/PPN based on assessed needs  - Assess need for intravenous fluids  - Provide specific nutrition/hydration education as appropriate  - Include patient/family/caregiver in decisions related to nutrition  Outcome: Progressing

## 2020-01-07 NOTE — ASSESSMENT & PLAN NOTE
· History of Afib  · EKG shows rate controlled Afib  · Continue with home medications Lopressor 25 mg twice daily and Eliquis

## 2020-01-08 LAB
ANION GAP SERPL CALCULATED.3IONS-SCNC: 9 MMOL/L (ref 4–13)
BASOPHILS # BLD AUTO: 0.03 THOUSANDS/ΜL (ref 0–0.1)
BASOPHILS NFR BLD AUTO: 0 % (ref 0–1)
BUN SERPL-MCNC: 27 MG/DL (ref 5–25)
CALCIUM SERPL-MCNC: 8.6 MG/DL (ref 8.3–10.1)
CHLORIDE SERPL-SCNC: 101 MMOL/L (ref 100–108)
CO2 SERPL-SCNC: 30 MMOL/L (ref 21–32)
CREAT SERPL-MCNC: 0.97 MG/DL (ref 0.6–1.3)
EOSINOPHIL # BLD AUTO: 0.15 THOUSAND/ΜL (ref 0–0.61)
EOSINOPHIL NFR BLD AUTO: 2 % (ref 0–6)
ERYTHROCYTE [DISTWIDTH] IN BLOOD BY AUTOMATED COUNT: 13.3 % (ref 11.6–15.1)
GFR SERPL CREATININE-BSD FRML MDRD: 52 ML/MIN/1.73SQ M
GLUCOSE SERPL-MCNC: 100 MG/DL (ref 65–140)
HCT VFR BLD AUTO: 43.8 % (ref 34.8–46.1)
HGB BLD-MCNC: 14.6 G/DL (ref 11.5–15.4)
IMM GRANULOCYTES # BLD AUTO: 0.03 THOUSAND/UL (ref 0–0.2)
IMM GRANULOCYTES NFR BLD AUTO: 0 % (ref 0–2)
LYMPHOCYTES # BLD AUTO: 1.42 THOUSANDS/ΜL (ref 0.6–4.47)
LYMPHOCYTES NFR BLD AUTO: 20 % (ref 14–44)
MCH RBC QN AUTO: 31.1 PG (ref 26.8–34.3)
MCHC RBC AUTO-ENTMCNC: 33.3 G/DL (ref 31.4–37.4)
MCV RBC AUTO: 93 FL (ref 82–98)
MONOCYTES # BLD AUTO: 0.92 THOUSAND/ΜL (ref 0.17–1.22)
MONOCYTES NFR BLD AUTO: 13 % (ref 4–12)
NEUTROPHILS # BLD AUTO: 4.4 THOUSANDS/ΜL (ref 1.85–7.62)
NEUTS SEG NFR BLD AUTO: 65 % (ref 43–75)
NRBC BLD AUTO-RTO: 0 /100 WBCS
PLATELET # BLD AUTO: 209 THOUSANDS/UL (ref 149–390)
PMV BLD AUTO: 11 FL (ref 8.9–12.7)
POTASSIUM SERPL-SCNC: 3.6 MMOL/L (ref 3.5–5.3)
RBC # BLD AUTO: 4.69 MILLION/UL (ref 3.81–5.12)
SODIUM SERPL-SCNC: 140 MMOL/L (ref 136–145)
WBC # BLD AUTO: 6.95 THOUSAND/UL (ref 4.31–10.16)

## 2020-01-08 PROCEDURE — 97116 GAIT TRAINING THERAPY: CPT

## 2020-01-08 PROCEDURE — 97530 THERAPEUTIC ACTIVITIES: CPT

## 2020-01-08 PROCEDURE — 80048 BASIC METABOLIC PNL TOTAL CA: CPT | Performed by: PHYSICIAN ASSISTANT

## 2020-01-08 PROCEDURE — 85025 COMPLETE CBC W/AUTO DIFF WBC: CPT | Performed by: PHYSICIAN ASSISTANT

## 2020-01-08 PROCEDURE — 94640 AIRWAY INHALATION TREATMENT: CPT

## 2020-01-08 PROCEDURE — 94760 N-INVAS EAR/PLS OXIMETRY 1: CPT

## 2020-01-08 PROCEDURE — 99232 SBSQ HOSP IP/OBS MODERATE 35: CPT | Performed by: PHYSICIAN ASSISTANT

## 2020-01-08 PROCEDURE — 99231 SBSQ HOSP IP/OBS SF/LOW 25: CPT | Performed by: INTERNAL MEDICINE

## 2020-01-08 PROCEDURE — 97535 SELF CARE MNGMENT TRAINING: CPT

## 2020-01-08 RX ORDER — BUMETANIDE 0.25 MG/ML
2 INJECTION, SOLUTION INTRAMUSCULAR; INTRAVENOUS 2 TIMES DAILY
Status: DISCONTINUED | OUTPATIENT
Start: 2020-01-08 | End: 2020-01-09 | Stop reason: HOSPADM

## 2020-01-08 RX ADMIN — IPRATROPIUM BROMIDE AND ALBUTEROL SULFATE 3 ML: 2.5; .5 SOLUTION RESPIRATORY (INHALATION) at 07:37

## 2020-01-08 RX ADMIN — Medication 400 MG: at 17:25

## 2020-01-08 RX ADMIN — APIXABAN 5 MG: 5 TABLET, FILM COATED ORAL at 09:09

## 2020-01-08 RX ADMIN — METOPROLOL TARTRATE 25 MG: 25 TABLET ORAL at 17:25

## 2020-01-08 RX ADMIN — MELATONIN 2000 UNITS: at 09:09

## 2020-01-08 RX ADMIN — ATORVASTATIN CALCIUM 20 MG: 20 TABLET, FILM COATED ORAL at 17:25

## 2020-01-08 RX ADMIN — IPRATROPIUM BROMIDE AND ALBUTEROL SULFATE 3 ML: 2.5; .5 SOLUTION RESPIRATORY (INHALATION) at 13:28

## 2020-01-08 RX ADMIN — OXYBUTYNIN CHLORIDE 10 MG: 5 TABLET, EXTENDED RELEASE ORAL at 09:09

## 2020-01-08 RX ADMIN — APIXABAN 5 MG: 5 TABLET, FILM COATED ORAL at 17:25

## 2020-01-08 RX ADMIN — METOPROLOL TARTRATE 25 MG: 25 TABLET ORAL at 09:10

## 2020-01-08 RX ADMIN — Medication 400 MG: at 09:09

## 2020-01-08 RX ADMIN — FUROSEMIDE 80 MG: 10 INJECTION, SOLUTION INTRAMUSCULAR; INTRAVENOUS at 09:10

## 2020-01-08 RX ADMIN — BUMETANIDE 2 MG: 0.25 INJECTION INTRAMUSCULAR; INTRAVENOUS at 15:09

## 2020-01-08 RX ADMIN — IPRATROPIUM BROMIDE AND ALBUTEROL SULFATE 3 ML: 2.5; .5 SOLUTION RESPIRATORY (INHALATION) at 20:50

## 2020-01-08 NOTE — SOCIAL WORK
Patient for tentative discharge tomorrow to [facility]  Auth obtained from Shlomo Paniagua at [place] [id number]  CASE - [id number]  5 days skilled 1/9/2020 - 1/13/2020  NRD 1/13  Ambulance Nazia Bhakta with [facility] - CASE - [id number]

## 2020-01-08 NOTE — OCCUPATIONAL THERAPY NOTE
Occupational Therapy Treatment Note     Patient Name: Drew Snellen  XUEQB'O Date: 1/8/2020  Problem List  Principal Problem:    Acute on chronic diastolic congestive heart failure ([address] )  Active Problems:    Hyperlipidemia    A-fib [facility])    Pacemaker          01/08/20 1446   Restrictions/Precautions   Weight Bearing Precautions Per Order No   Other Precautions Cognitive; Fall Risk; Chair Alarm; Bed Alarm   Lifestyle   Autonomy Pt was found asleep in recliner and scooted to the bottom of leg rest portion of recliner  OT assisted pt to safe position and seen for 2nd session at this time  Pain Assessment   Pain Assessment No/denies pain   Pain Score No Pain   ADL   LB Dressing Assistance 2  Maximal Assistance   LB Dressing Deficit Thread RLE into underwear; Thread LLE into underwear;Pull up over hips;Verbal cueing;Supervision/safety;Steadying   Toileting Assistance  2  Maximal Assistance   Toileting Deficit Supervison/safety;Verbal cueing;Perineal hygiene;Clothing management up;Clothing management down   Bed Mobility   Sit to Supine 2  Maximal assistance   Additional items Assist x 2;Verbal cues;LE management; Bedrails;HOB elevated   Transfers   Sit to Stand 4  Minimal assistance   Additional items Assist x 2;Verbal cues  (RW)   Stand to Sit 4  Minimal assistance   Additional items Assist x 2;Verbal cues  (RW)   Stand pivot 3  Moderate assistance   Additional items Assist x 1;Verbal cues  (RW)   Additional Comments  Pt also requires frequent VC to position herself close to chair/bed when attempting to sit  P   Cognition   Overall Cognitive Status Impaired   Arousal/Participation Alert   Attention Attends with cues to redirect   Orientation Level Oriented to person;Disoriented to place; Disoriented to time;Disoriented to situation   Memory Decreased recall of precautions;Decreased short term memory;Decreased recall of recent events   Following Commands Follows one step commands with increased time or repetition Activity Tolerance   Activity Tolerance Patient limited by fatigue   Medical Staff Made Aware RN Deann   Assessment   Assessment Pt was found asleep in recliner and scooted to the bottom of leg rest portion of recliner  Pt seen for 2nd session to safely assist pt to better position and further assist with toileting  Patient  participated in Skilled OT session this date with interventions consisting of ADL re training with the use of correct body mechnaics, safety awareness and fall prevention techniques, increase dynamic sit/ stand balance during functional activity , increase postural control and increase trunk control   Patient agreeable to OT treatment session, upon arrival patient was found seated OOB to Recliner  Pt shifted in chair with max A x 2  Pt then performed sit<>stand transfer with min A x 2 with RW  Pt performed functional mobility to bathroom with min A x 2 with RW  Pt performed toileting and LB dressing with max A x 1  Pt continues to demonstrate poor posture when standing with RW  Pt also requires frequent VC to position herself close to chair/bed when attempting to sit  Patient continues to be functioning below baseline level, occupational performance remains limited secondary to factors listed above, and pt at increased risk for falls and injury  From OT standpoint, recommendation at time of d/c would be Short Term Rehab  Patient to benefit from continued Occupational Therapy treatment while in the hospital to address deficits as defined above and maximize level of functional independence with ADLs and functional mobility  Pt left with call bell in reach, tray table in reach, needs met, bed alarm activated, SCDs on  RN aware  Plan   Treatment Interventions ADL retraining;Functional transfer training;UE strengthening/ROM; Endurance training;Cognitive reorientation;Patient/family training;Equipment evaluation/education; Activityengagement   Goal Expiration Date 01/15/20   OT Treatment Day 2   OT Frequency 2-3x/wk   Recommendation   OT Discharge Recommendation Short Term Rehab       UBALDO HermanR/L

## 2020-01-08 NOTE — PLAN OF CARE
Problem: Potential for Falls  Goal: Patient will remain free of falls  Description  INTERVENTIONS:  - Assess patient frequently for physical needs  -  Identify cognitive and physical deficits and behaviors that affect risk of falls    -  [place] fall precautions as indicated by assessment   - Educate patient/family on patient safety including physical limitations  - Instruct patient to call for assistance with activity based on assessment  - Modify environment to reduce risk of injury  - Consider OT/PT consult to assist with strengthening/mobility  Outcome: Progressing     Problem: Prexisting or High Potential for Compromised Skin Integrity  Goal: Skin integrity is maintained or improved  Description  INTERVENTIONS:  - Identify patients at risk for skin breakdown  - Assess and monitor skin integrity  - Assess and monitor nutrition and hydration status  - Monitor labs   - Assess for incontinence   - Turn and reposition patient  - Assist with mobility/ambulation  - Relieve pressure over bony prominences  - Avoid friction and shearing  - Provide appropriate hygiene as needed including keeping skin clean and dry  - Evaluate need for skin moisturizer/barrier cream  - Collaborate with interdisciplinary team   - Patient/family teaching  - Consider wound care consult   Outcome: Progressing     Problem: RESPIRATORY - ADULT  Goal: Achieves optimal ventilation and oxygenation  Description  INTERVENTIONS:  - Assess for changes in respiratory status  - Assess for changes in mentation and behavior  - Position to facilitate oxygenation and minimize respiratory effort  - Oxygen administered by appropriate delivery if ordered  - Initiate smoking cessation education as indicated  - Encourage broncho-pulmonary hygiene including cough, deep breathe, Incentive Spirometry  - Assess the need for suctioning and aspirate as needed  - Assess and instruct to report SOB or any respiratory difficulty  - Respiratory Therapy support as indicated  Outcome: Progressing     Problem: PAIN - ADULT  Goal: Verbalizes/displays adequate comfort level or baseline comfort level  Description  Interventions:  - Encourage patient to monitor pain and request assistance  - Assess pain using appropriate pain scale  - Administer analgesics based on type and severity of pain and evaluate response  - Implement non-pharmacological measures as appropriate and evaluate response  - Consider cultural and social influences on pain and pain management  - Notify physician/advanced practitioner if interventions unsuccessful or patient reports new pain  Outcome: Progressing     Problem: SAFETY ADULT  Goal: Maintain or return to baseline ADL function  Description  INTERVENTIONS:  -  Assess patient's ability to carry out ADLs; assess patient's baseline for ADL function and identify physical deficits which impact ability to perform ADLs (bathing, care of mouth/teeth, toileting, grooming, dressing, etc )  - Assess/evaluate cause of self-care deficits   - Assess range of motion  - Assess patient's mobility; develop plan if impaired  - Assess patient's need for assistive devices and provide as appropriate  - Encourage maximum independence but intervene and supervise when necessary  - Involve family in performance of ADLs  - Assess for home care needs following discharge   - Consider OT consult to assist with ADL evaluation and planning for discharge  - Provide patient education as appropriate  Outcome: Progressing  Goal: Maintain or return mobility status to optimal level  Description  INTERVENTIONS:  - Assess patient's baseline mobility status (ambulation, transfers, stairs, etc )    - Identify cognitive and physical deficits and behaviors that affect mobility  - Identify mobility aids required to assist with transfers and/or ambulation (gait belt, sit-to-stand, lift, walker, cane, etc )  - [place] fall precautions as indicated by assessment  - Record patient progress and toleration of activity level on Mobility SBAR; progress patient to next Phase/Stage  - Instruct patient to call for assistance with activity based on assessment  - Consider rehabilitation consult to assist with strengthening/weightbearing, etc   Outcome: Progressing     Problem: DISCHARGE PLANNING  Goal: Discharge to home or other facility with appropriate resources  Description  INTERVENTIONS:  - Identify barriers to discharge w/patient and caregiver  - Arrange for needed discharge resources and transportation as appropriate  - Identify discharge learning needs (meds, wound care, etc )  - Arrange for interpretive services to assist at discharge as needed  - Refer to Case Management Department for coordinating discharge planning if the patient needs post-hospital services based on physician/advanced practitioner order or complex needs related to functional status, cognitive ability, or social support system  Outcome: Progressing     Problem: Knowledge Deficit  Goal: Patient/family/caregiver demonstrates understanding of disease process, treatment plan, medications, and discharge instructions  Description  Complete learning assessment and assess knowledge base  Interventions:  - Provide teaching at level of understanding  - Provide teaching via preferred learning methods  Outcome: Progressing     Problem: Nutrition/Hydration-ADULT  Goal: Nutrient/Hydration intake appropriate for improving, restoring or maintaining nutritional needs  Description  Monitor and assess patient's nutrition/hydration status for malnutrition  Collaborate with interdisciplinary team and initiate plan and interventions as ordered  Monitor patient's weight and dietary intake as ordered or per policy  Utilize nutrition screening tool and intervene as necessary  Determine patient's food preferences and provide high-protein, high-caloric foods as appropriate       INTERVENTIONS:  - Monitor oral intake, urinary output, labs, and treatment plans  - Assess nutrition and hydration status and recommend course of action  - Evaluate amount of meals eaten  - Assist patient with eating if necessary   - Allow adequate time for meals  - Recommend/ encourage appropriate diets, oral nutritional supplements, and vitamin/mineral supplements  - Order, calculate, and assess calorie counts as needed  - Recommend, monitor, and adjust tube feedings and TPN/PPN based on assessed needs  - Assess need for intravenous fluids  - Provide specific nutrition/hydration education as appropriate  - Include patient/family/caregiver in decisions related to nutrition  Outcome: Progressing

## 2020-01-08 NOTE — PHYSICAL THERAPY NOTE
PT tx     01/08/20 1445   Pain Assessment   Pain Assessment No/denies pain   Pain Score No Pain   Restrictions/Precautions   Weight Bearing Precautions Per Order No   Other Precautions Cognitive; Bed Alarm; Chair Alarm   General   Chart Reviewed Yes   Additional Pertinent History pt slid down to foot rest of recliner Ax2 to shift back to get up   Cognition   Overall Cognitive Status Impaired   Arousal/Participation Alert   Attention Attends with cues to redirect   Orientation Level Oriented to person;Oriented to place   Memory Decreased recall of precautions;Decreased recall of recent events;Decreased short term memory   Following Commands Follows one step commands with increased time or repetition   Subjective   Subjective I did? needs to change brief   Bed Mobility   Sit to Supine 2  Maximal assistance   Additional items Assist x 2   Transfers   Sit to Stand 4  Minimal assistance   Additional items Assist x 2   Stand to Sit 4  Minimal assistance   Additional items Assist x 2   Stand pivot 3  Moderate assistance   Additional items Assist x 1  (rw)   Ambulation/Elevation   Gait pattern Shuffling; Forward Flexion; Wide LATISHA; Short stride; Inconsistent dominique;Poor UE support   Gait Assistance 3  Moderate assist   Additional items Assist x 1   Assistive Device Rolling walker   Distance 5'x2 seate rest bwtween due to osb and 02 sat 87%   Balance   Static Sitting Good   Dynamic Sitting Fair   Static Standing Fair -   Dynamic Standing Fair -   Ambulatory Poor +   Endurance Deficit   Endurance Deficit Yes   Endurance Deficit Description tires quickly 02 sat returns to 95% wih seated rest    Activity Tolerance   Activity Tolerance Patient limited by fatigue  (some sob occas)   Medical Staff Made Aware OT Kaycee   Assessment   Prognosis Good   Problem List Decreased strength;Decreased range of motion;Decreased endurance; Impaired balance;Decreased mobility; Decreased safety awareness   Assessment Pt requires signif assist for all mobility, responds well to pacing  Returmed to bed after toiletting adn breif change  Limited activity tolerance  Continue to recommend short term in-pt rehab atd/c  Con't PT   Goals   Patient Goals get better   Plan   Treatment/Interventions ADL retraining;Functional transfer training;LE strengthening/ROM; Therapeutic exercise; Endurance training;Cognitive reorientation;Patient/family training;Equipment eval/education; Bed mobility;Gait training;Spoke to nursing;Spoke to case management;OT   Progress Slow progress, medical status limitations   PT Frequency 5x/wk   Recommendation   Recommendation Short-term skilled PT   Equipment Recommended Walker; Wheelchair   PT - OK to Discharge Yes   Additional Comments   (to in-pt rehab with medcial clearance)   Marvin Green, PT

## 2020-01-08 NOTE — PROGRESS NOTES
[facility]  Progress Note Nigel Leo 5/16/1929, 80 y o  female MRN: [id number]  Unit/Bed#: -01 Encounter: [id number]  Primary Care Provider: OH Moeller   Date and time admitted to hospital: 1/5/2020 11:13 AM    * Acute on chronic diastolic congestive heart failure ([facility] [address] )  Assessment & Plan  Wt Readings from Last 3 Encounters:   01/08/20 98 kg (216 lb 0 8 oz)   01/07/18 93 4 kg (206 lb)   11/01/17 92 9 kg (204 lb 12 9 oz)       · Acute on chronic diastolic CHF, present on admission as evidenced by shortness of breath, cough, elevated proBNP 2303, chest x-ray finding of pulmonary edema  · Last ECHO (10/10/17) at [facility]- EF 60%, mild Aortic stenosis  · Continue diuresis with Lasix 80 mg IV twice daily, consider increasing if patient weight plateus  · Recorded weight in the ED - 218lbs  · Today 216  · Last recorded weight 11/1/17 - 204lbs- goal  · Daily weight and I&Os  · Strict Is/Os- purwick cathter as patient is incontinent  · Repeat ECHO ordered this admission, 60% EF, no abnormalities, peak pressure 30 mm Hg  · Cardiology consulted  · Currently on 2L NC, keep O2 sats > 92% with supplemental oxygen as needed  · Continue with incentive spirometry, encourage out of bed to chair  · DuoNeb inhalers q i d   · P T /OT    Pacemaker  Assessment & Plan  · Status post pacemaker insertion    A-fib ([facility] )  Assessment & Plan  · History of Afib  · EKG shows rate controlled Afib  · Continue with home medications Lopressor 25 mg twice daily and Eliquis    Hyperlipidemia  Assessment & Plan  · History of hyperlipidemia on atorvastatin 20 mg daily      VTE Pharmacologic Prophylaxis:   Pharmacologic: Apixaban (Eliquis)  Mechanical VTE Prophylaxis in Place: Yes    Patient Centered Rounds: I have performed bedside rounds with nursing staff today  Discussions with Specialists or Other Care Team Provider: Reviewed patient with nursing and attending      Education and Discussions with Family / Patient: Discussed care plan with patient at bedside  Answered all questions to the best of my ability  Time Spent for Care: 20 minutes  More than 50% of total time spent on counseling and coordination of care as described above  Current Length of Stay: 3 day(s)    Current Patient Status: Inpatient   Certification Statement: The patient will continue to require additional inpatient hospital stay due to IV diuresis    Discharge Plan:  Patient comes from nursing home, patient will likely need rehab upon discharge  Await final PT/OT recommendations after improvement with IV diuresis  Anticipate 48 hours prior to discharge  Code Status: Level 3 - DNAR and DNI      Subjective:   Patient reports she feels well today  Denies shortness of breath, chest pain, lower extremity swelling  Denies confusion today  Comfortably eating lunch  Objective:     Vitals:   Temp (24hrs), Av 6 °F (36 4 °C), Min:97 5 °F (36 4 °C), Max:97 8 °F (36 6 °C)    Temp:  [97 5 °F (36 4 °C)-97 8 °F (36 6 °C)] 97 5 °F (36 4 °C)  HR:  [54-61] 54  Resp:  [16-20] 16  BP: (103-128)/(51-55) 128/55  SpO2:  [90 %-99 %] 94 %  Body mass index is 35 95 kg/m²  Input and Output Summary (last 24 hours): Intake/Output Summary (Last 24 hours) at 2020 1227  Last data filed at 2020 5355  Gross per 24 hour   Intake 240 ml   Output 600 ml   Net -360 ml       Physical Exam:     Physical Exam   Constitutional: She appears well-developed and well-nourished  No distress  HENT:   Head: Normocephalic and atraumatic  Eyes: Conjunctivae are normal  No scleral icterus  Cardiovascular: Normal rate and regular rhythm  No murmur heard  Pulmonary/Chest: Effort normal  No respiratory distress  She has decreased breath sounds in the right lower field and the left lower field  She has no wheezes  She has no rales  Abdominal: Soft  She exhibits no distension  There is no tenderness  Musculoskeletal: She exhibits no edema  Neurological: She is alert  Skin: Skin is warm and dry  No erythema  Psychiatric: She has a normal mood and affect  Nursing note and vitals reviewed  Additional Data:     Labs:    Results from last 7 days   Lab Units 01/08/20  0536   WBC Thousand/uL 6 95   HEMOGLOBIN g/dL 14 6   HEMATOCRIT % 43 8   PLATELETS Thousands/uL 209   NEUTROS PCT % 65   LYMPHS PCT % 20   MONOS PCT % 13*   EOS PCT % 2     Results from last 7 days   Lab Units 01/08/20  0536  01/05/20  1128   SODIUM mmol/L 140   < > 135*   POTASSIUM mmol/L 3 6   < > 4 4   CHLORIDE mmol/L 101   < > 101   CO2 mmol/L 30   < > 28   BUN mg/dL 27*   < > 15   CREATININE mg/dL 0 97   < > 1 01   ANION GAP mmol/L 9   < > 6   CALCIUM mg/dL 8 6   < > 9 4   ALBUMIN g/dL  --   --  3 2*   TOTAL BILIRUBIN mg/dL  --   --  1 40*   ALK PHOS U/L  --   --  86   ALT U/L  --   --  22   AST U/L  --   --  19   GLUCOSE RANDOM mg/dL 100   < > 106    < > = values in this interval not displayed  Results from last 7 days   Lab Units 01/05/20  1128   PROCALCITONIN ng/ml <0 05           * I Have Reviewed All Lab Data Listed Above  * Additional Pertinent Lab Tests Reviewed:  All Labs Within Last 24 Hours Reviewed    Imaging:    Imaging Reports Reviewed Today Include: None  Imaging Personally Reviewed by Myself Includes:  None    Recent Cultures (last 7 days):           Last 24 Hours Medication List:     Current Facility-Administered Medications:  acetaminophen 650 mg Oral Q8H PRN Josue Soulier, MD   albuterol 2 5 mg Nebulization Q6H PRN Josue Soulier, MD   apixaban 5 mg Oral BID Олег Jean MD   atorvastatin 20 mg Oral Daily With Shaun Mcrae MD   cholecalciferol 2,000 Units Oral Daily Олег Jean MD   furosemide 80 mg Intravenous BID (diuretic) Josue Soulier, MD   ipratropium-albuterol 3 mL Nebulization TID Josue Soulier, MD   magnesium oxide 400 mg Oral BID Олег Jean MD   metoprolol tartrate 25 mg Oral BID Lee Medina MD   oxybutynin 10 mg Oral Daily Lee Medina MD   senna-docusate sodium 1 tablet Oral PRN Lee Medina MD        Today, Patient Was Seen By: Vinh Heller PA-C    ** Please Note: Dictation voice to text software may have been used in the creation of this document   **

## 2020-01-08 NOTE — PROGRESS NOTES
Cardiology Progress Note Lolly Bullock 80 y o  female MRN: [id number]    Unit/Bed#: -01 Encounter: [id number]        Subjective:    No significant events overnight  She has no dyspnea or chest pain at rest      Review of Systems   Constitution: Positive for malaise/fatigue  Cardiovascular: Negative for chest pain  Respiratory: Negative for shortness of breath  Objective:   Vitals: Blood pressure 128/55, pulse (!) 54, temperature 97 5 °F (36 4 °C), resp  rate 16, weight 98 kg (216 lb 0 8 oz), SpO2 94 %, not currently breastfeeding , Body mass index is 35 95 kg/m² ,   Orthostatic Blood Pressures      Most Recent Value   Blood Pressure  128/55 filed at 01/08/2020 0909   Patient Position - Orthostatic VS  Sitting filed at 01/05/2020 3048         Systolic (48VVX), LEC:491 , Min:103 , GTR:540     Diastolic (42TXE), JWF:85, Min:51, Max:55      Intake/Output Summary (Last 24 hours) at 1/8/2020 1502  Last data filed at 1/8/2020 7125  Gross per 24 hour   Intake 240 ml   Output 600 ml   Net -360 ml     Weight (last 2 days)     Date/Time   Weight    01/08/20 0600   98 (216 05)    01/07/20 0556   97 5 (214 95)    01/06/20 0617   98 1 (216 27)    01/06/20 0600   81 6 (180)                    Physical Exam   Constitutional: She is oriented to person, place, and time  No distress  HENT:   Mouth/Throat: No oropharyngeal exudate  Eyes: No scleral icterus  Neck: JVD present  Cardiovascular: Normal rate  An irregularly irregular rhythm present  Pulmonary/Chest: Effort normal and breath sounds normal  No stridor  No respiratory distress  She has no wheezes  Abdominal: Soft  Bowel sounds are normal  She exhibits no distension  There is no tenderness  There is no guarding  Musculoskeletal: She exhibits no edema  Neurological: She is alert and oriented to person, place, and time  Skin: Skin is warm and dry  She is not diaphoretic  Psychiatric: She has a normal mood and affect   Her behavior is normal          Laboratory Results:  Results from last 7 days   Lab Units 01/05/20  1128   TROPONIN I ng/mL <0 02       CBC with diff:   Results from last 7 days   Lab Units 01/08/20 0536 01/07/20  0544 01/06/20  0527 01/05/20  1128   WBC Thousand/uL 6 95 5 43 6 65 6 89   HEMOGLOBIN g/dL 14 6 14 0 13 9 14 2   HEMATOCRIT % 43 8 41 9 42 5 43 4   MCV fL 93 93 94 94   PLATELETS Thousands/uL 209 186 218 208   MCH pg 31 1 31 2 30 7 30 9   MCHC g/dL 33 3 33 4 32 7 32 7   RDW % 13 3 13 6 14 7 13 9   MPV fL 11 0 10 4 10 5 10 7   NRBC AUTO /100 WBCs 0 0  --  0         CMP:  Results from last 7 days   Lab Units 01/08/20  0536 01/07/20  0544 01/06/20  0527 01/05/20  1128 01/05/20  1122   POTASSIUM mmol/L 3 6 3 5 4 2 4 4  --    CHLORIDE mmol/L 101 101 100 101  --    CO2 mmol/L 30 29 27 28  --    CO2, I-STAT mmol/L  --   --   --   --  29   BUN mg/dL 27* 21 18 15  --    CREATININE mg/dL 0 97 0 96 1 06 1 01  --    CALCIUM mg/dL 8 6 8 7 8 7 9 4  --    AST U/L  --   --   --  19  --    ALT U/L  --   --   --  22  --    ALK PHOS U/L  --   --   --  86  --    EGFR ml/min/1 73sq m 52 52 46 49  --          BMP:  Results from last 7 days   Lab Units 01/08/20 0536 01/07/20  0544 01/06/20  0527 01/05/20  1128 01/05/20  1122   POTASSIUM mmol/L 3 6 3 5 4 2 4 4  --    CHLORIDE mmol/L 101 101 100 101  --    CO2 mmol/L 30 29 27 28  --    CO2, I-STAT mmol/L  --   --   --   --  29   BUN mg/dL 27* 21 18 15  --    CREATININE mg/dL 0 97 0 96 1 06 1 01  --    CALCIUM mg/dL 8 6 8 7 8 7 9 4  --        BNP: No results for input(s): BNP in the last 72 hours      Magnesium:   Results from last 7 days   Lab Units 01/07/20  0544 01/06/20  0527   MAGNESIUM mg/dL 2 4 2 3       Coags:       TSH: No results found for: TSH    Hemoglobin A1C       Lipid Profile:       Cardiac testing:   Results for orders placed during the hospital encounter of 01/05/20   Echo complete with contrast if indicated    Narrative [address]  [address] [address]    Transthoracic Echocardiogram  2D, M-mode, Doppler, and Color Doppler    Study date:  2020    Patient: Panda Drummond  MR number: [id number]  Account number: [de-identified]  : 16-May-1929  Age: 80 years  Gender: Female  Status: Inpatient  Location: [address] [place] [address]  Height: 65 in  Weight: 215 6 lb  BP: 118/ 50 mmHg    Indications: Heart failure    Diagnoses: I50 9 - Heart failure, unspecified    Sonographer:  JOSE MARTIN Anne  Primary Physician:  OH Ritchie  Referring Physician:  Mynor Erickson MD  Group:  [facility]  Interpreting Physician:  David Minor MD    SUMMARY    LEFT VENTRICLE:  Systolic function was normal by visual assessment  Ejection fraction was estimated to be 60 %  There were no regional wall motion abnormalities  RIGHT VENTRICLE:  Systolic pressure was within the normal range  Estimated peak pressure was 30 mmHg  LEFT ATRIUM:  The atrium was moderately dilated  RIGHT ATRIUM:  The atrium was moderately dilated  MITRAL VALVE:  There was mild regurgitation  AORTIC VALVE:  There was mild regurgitation  TRICUSPID VALVE:  There was mild regurgitation  HISTORY: PRIOR HISTORY: CHF; HLD; Afib; Pacemaker; CAD; Thyroid disease; LVH; Edema; Anemia; Renal disorder; Vitamin D deficiency    PROCEDURE: The study was performed in the [address] [place] [address]  This was a routine study  The transthoracic approach was used  The study included complete 2D imaging, M-mode, complete spectral Doppler, and color Doppler  Intravenous contrast  ( 1 2 ml Definity in NSS) was administered to opacify the left ventricle  Echocardiographic views were limited due to decreased penetration and lung interference  This was a technically difficult study  LEFT VENTRICLE: Size was normal  Systolic function was normal by visual assessment  Ejection fraction was estimated to be 60 %  There were no regional wall motion abnormalities   Lindalee Opitz thickness was normal  DOPPLER: Transmitral flow pattern:  atrial fibrillation  RIGHT VENTRICLE: The size was normal  Systolic function was normal  A pacing wire was present  DOPPLER: Systolic pressure was within the normal range  Estimated peak pressure was 30 mmHg  LEFT ATRIUM: The atrium was moderately dilated  RIGHT ATRIUM: The atrium was moderately dilated  MITRAL VALVE: Valve structure was normal  There was normal leaflet separation  DOPPLER: The transmitral velocity was within the normal range  There was no evidence for stenosis  There was mild regurgitation  AORTIC VALVE: The valve was trileaflet  Leaflets exhibited normal thickness, mild to moderate calcification, and mildly reduced cuspal separation  DOPPLER: Transaortic velocity was within the normal range  There was no evidence for  stenosis  There was mild regurgitation  TRICUSPID VALVE: The valve structure was normal  There was normal leaflet separation  DOPPLER: The transtricuspid velocity was within the normal range  There was no evidence for stenosis  There was mild regurgitation  PULMONIC VALVE: Leaflets exhibited normal thickness, no calcification, and normal cuspal separation  DOPPLER: The transpulmonic velocity was within the normal range  There was no regurgitation  PERICARDIUM: There was no pericardial effusion  AORTA: The root exhibited normal size  SYSTEMIC VEINS: IVC: The inferior vena cava was normal in size and course   Respirophasic changes were normal     SYSTEM MEASUREMENT TABLES    2D  %FS: 35 93 %  AV Diam: 2 7 cm  EDV(Teich): 93 04 ml  EF Biplane: 62 57 %  EF(Teich): 65 65 %  ESV(Teich): 31 96 ml  IVSd: 1 06 cm  LA Area: 26 9 cm2  LA Diam: 4 66 cm  LVEDV MOD A2C: 45 09 ml  LVEDV MOD A4C: 66 88 ml  LVEDV MOD BP: 60 66 ml  LVEF MOD A2C: 63 07 %  LVEF MOD A4C: 58 92 %  LVESV MOD A2C: 16 65 ml  LVESV MOD A4C: 27 47 ml  LVESV MOD BP: 22 71 ml  LVIDd: 4 51 cm  LVIDs: 2 89 cm  LVLd A2C: 5 33 cm  LVLd A4C: 6 57 cm  LVLs A2C: 5 01 cm  LVLs A4C: 5 73 cm  LVPWd: 1 06 cm  RA Area: 27 13 cm2  RVIDd: 4 15 cm  SV MOD A2C: 28 44 ml  SV MOD A4C: 39 41 ml  SV(Teich): 61 08 ml    CW  RAP: 10 mmHg  TR Vmax: 2 69 m/s  TR maxP 03 mmHg    PW  RVSP: 39 03 mmHg    [facility]    Prepared and electronically signed by    Mary Santoyo MD  Signed 2020 16:19:30       No results found for this or any previous visit  No results found for this or any previous visit  No results found for this or any previous visit      Meds/Allergies   current meds:   Current Facility-Administered Medications   Medication Dose Route Frequency    acetaminophen (TYLENOL) tablet 650 mg  650 mg Oral Q8H PRN    albuterol inhalation solution 2 5 mg  2 5 mg Nebulization Q6H PRN    apixaban (ELIQUIS) tablet 5 mg  5 mg Oral BID    atorvastatin (LIPITOR) tablet 20 mg  20 mg Oral Daily With Dinner    bumetanide (BUMEX) injection 2 mg  2 mg Intravenous BID    cholecalciferol (VITAMIN D3) tablet 2,000 Units  2,000 Units Oral Daily    ipratropium-albuterol (DUO-NEB) 0 5-2 5 mg/3 mL inhalation solution 3 mL  3 mL Nebulization TID    magnesium oxide (MAG-OX) tablet 400 mg  400 mg Oral BID    metoprolol tartrate (LOPRESSOR) tablet 25 mg  25 mg Oral BID    oxybutynin (DITROPAN-XL) 24 hr tablet 10 mg  10 mg Oral Daily    senna-docusate sodium (SENOKOT S) 8 6-50 mg per tablet 1 tablet  1 tablet Oral PRN     Medications Prior to Admission   Medication    acetaminophen (TYLENOL) 500 mg tablet    alendronate (FOSAMAX) 70 mg tablet    apixaban (ELIQUIS) 5 mg    atorvastatin (LIPITOR) 20 mg tablet    calcium carbonate (OS-DAVE) 600 MG tablet    cholecalciferol (VITAMIN D3) 1,000 units tablet    dextromethorphan-guaifenesin (MUCINEX DM)  MG per 12 hr tablet    furosemide (LASIX) 80 mg tablet    metoprolol tartrate (LOPRESSOR) 25 mg tablet    mineral oil enema    Mirabegron ER (MYRBETRIQ) 50 MG TB24  Omega-3 Fatty Acids (FISH OIL) 1,000 mg    oxybutynin (DITROPAN-XL) 10 MG 24 hr tablet    Polyethylene Glycol 3350 (PEG 3350 PO)    potassium chloride (K-DUR,KLOR-CON) 20 mEq tablet    senna-docusate sodium (SENOKOT S) 8 6-50 mg per tablet    spironolactone (ALDACTONE) 25 mg tablet          Assessment:  Principal Problem:    Acute on chronic diastolic congestive heart failure (HCC)  Active Problems:    Hyperlipidemia    A-fib (HCC)    Pacemaker      Plan:    Acute on Chronic Diastolic CHF / Perm AF / PPM    Switch IV diuretic to bumex and hopefully she will respond better  Continue eliquis and metoprolol for rate control of AF  Counseling / Coordination of Care  Total floor / unit time spent today 25 minutes  Greater than 50% of total time was spent with the patient and / or family counseling and / or coordination of care  A description of the counseling / coordination of care

## 2020-01-08 NOTE — PLAN OF CARE
Problem: OCCUPATIONAL THERAPY ADULT  Goal: Performs self-care activities at highest level of function for planned discharge setting  See evaluation for individualized goals  Description  Treatment Interventions: ADL retraining, Functional transfer training, Endurance training, Cognitive reorientation, Patient/family training, Equipment evaluation/education, Compensatory technique education, Energy conservation          See flowsheet documentation for full assessment, interventions and recommendations  1/8/2020 1629 by Yassine Love OT  Outcome: Progressing  Note:   Limitation: Decreased ADL status, Decreased Safe judgement during ADL, Decreased cognition, Decreased endurance, Decreased self-care trans, Decreased high-level ADLs  Prognosis: Good  Assessment: Pt was found asleep in recliner and scooted to the bottom of leg rest portion of recliner  Pt seen for 2nd session to safely assist pt to better position and further assist with toileting  Patient  participated in Skilled OT session this date with interventions consisting of ADL re training with the use of correct body mechnaics, safety awareness and fall prevention techniques, increase dynamic sit/ stand balance during functional activity , increase postural control and increase trunk control   Patient agreeable to OT treatment session, upon arrival patient was found seated OOB to Recliner  Pt shifted in chair with max A x 2  Pt then performed sit<>stand transfer with min A x 2 with RW  Pt performed functional mobility to bathroom with min A x 2 with RW  Pt performed toileting and LB dressing with max A x 1  Pt continues to demonstrate poor posture when standing with RW  Pt also requires frequent VC to position herself close to chair/bed when attempting to sit  Patient continues to be functioning below baseline level, occupational performance remains limited secondary to factors listed above, and pt at increased risk for falls and injury     From OT standpoint, recommendation at time of d/c would be Short Term Rehab  Patient to benefit from continued Occupational Therapy treatment while in the hospital to address deficits as defined above and maximize level of functional independence with ADLs and functional mobility  Pt left with call bell in reach, tray table in reach, needs met, bed alarm activated, SCDs on  RN aware  OT Discharge Recommendation: Short Term Rehab  OT - OK to Discharge: No    1/8/2020 1241 by Jasmin Ghosh OT  Outcome: Progressing  Note:   Limitation: Decreased ADL status, Decreased Safe judgement during ADL, Decreased cognition, Decreased endurance, Decreased self-care trans, Decreased high-level ADLs  Prognosis: Good  Assessment: Patient participated in Skilled OT session this date with interventions consisting of safety awareness and fall prevention techniques,  therapeutic activities to: increase activity tolerance, increase dynamic sit/ stand balance during functional activity , increase postural control, increase trunk control and increase OOB/ sitting tolerance   Patient agreeable to OT treatment session, upon arrival patient was found supine in bed  Treatment session as follows: performed bed mobility with max A x 1, sit<>stand with mod A x 1, bed>chair transfer with mod A x 1, LB dressing activity with max A x 1, and grooming activity with supervision  Patient continues to be functioning below baseline level, occupational performance remains limited secondary to factors listed above, and pt at increased risk for falls and injury  From OT standpoint, recommendation at time of d/c would be Short Term Rehab  Patient to benefit from continued Occupational Therapy treatment while in the hospital to address deficits as defined above and maximize level of functional independence with ADLs and functional mobility  Pt left with call bell in reach, tray table in reach, needs met, chairalarm activated, SCDs on  RN aware        OT Discharge Recommendation: Short Term Rehab  OT - OK to Discharge: No

## 2020-01-08 NOTE — TRANSPORTATION MEDICAL NECESSITY
Section I - General Information    Name of Patient: Hansel Mallory                 : 1929    Medicare #: [id number]  Transport Date: 20 (PCS is valid for round trips on this date and for all repetitive trips in the 60-day range as noted below )  Origin: [facility]  Is the pt's stay covered under Medicare Part A (PPS/DRG)   []     Closest appropriate facility? If no, why is transport to more distant facility required? Yes  If hospice pt, is this transport related to pt's terminal illness? n/a       Section II - Medical Necessity Questionnaire  Ambulance transportation is medically necessary only if other means of transport are contraindicated or would be potentially harmful to the patient  To meet this requirement, the patient must either be "bed confined" or suffer from a condition such that transport by means other than ambulance is contraindicated by the patient's condition  The following questions must be answered by the medical professional signing below for this form to be valid:    1)  Describe the MEDICAL CONDITION (physical and/or mental) of this patient AT [address] [place] [address] that requires the patient to be transported in an ambulance and why transport by other means is contraindicated by the patient's condition:   Patient with history of dementia, confused at times, poor safety awareness, fall risk     2) Is the patient "bed confined" as defined below? To be "be confined" the patient must satisfy all three of the following conditions: (1) unable to get up from bed without Assistance; AND (2) unable to ambulate; AND (3) unable to sit in a chair or wheelchair  3) Can this patient safely be transported by car or wheelchair van (i e , seated during transport without a medical attendant or monitoring)?    No    4) In addition to completing questions 1-3 above, please check any of the following conditions that apply*:   *Note: supporting documentation for any boxes checked must be maintained in the patient's medical records  If hosp-hosp transfer, describe services needed at 2nd facility not available at 1st facility? Patient is confused  Other(specify) poor safety awareness, history of dementia and parkinsons, fall risk      Section III - Signature of Physician or Healthcare Professional  I certify that the above information is true and correct based on my evaluation of this patient, and represent that the patient requires transport by ambulance and that other forms of transport are contraindicated  I understand that this information will be used by the Centers for Medicare and Medicaid Services (CMS) to support the determination of medical necessity for ambulance services, and I represent that I have personal knowledge of the patient's condition at time of transport  []  If this box is checked, I also certify that the patient is physically or mentally incapable of signing the ambulance service's claim and that the institution with which I am affiliated has furnished care, services, or assistance to the patient  My signature below is made on behalf of the patient pursuant to 42 CFR §424 36(b)(4)  In accordance with 42 CFR §424 37, the specific reason(s) that the patient is physically or mentally incapable of signing the claim form is as follows:     Signature of Physician* or Healthcare Professional______________________________________________________________  Signature Date 01/08/20 (For scheduled repetitive transports, this form is not valid for transports performed more than 60 days after this date)    Printed Name & Credentials of Physician or Healthcare Professional (MD, DO, RN, etc )___Erika Shafer_____________________________  *Form must be signed by patient's attending physician for scheduled, repetitive transports   For non-repetitive, unscheduled ambulance transports, if unable to obtain the signature of the attending physician, any of the following may sign (choose appropriate option below)  [] Physician Assistant []  Clinical Nurse Specialist [x]  Registered Nurse  []  Nurse Practitioner  [x] Discharge Planner

## 2020-01-08 NOTE — PLAN OF CARE
Problem: PHYSICAL THERAPY ADULT  Goal: Performs mobility at highest level of function for planned discharge setting  See evaluation for individualized goals  Description  Treatment/Interventions: ADL retraining, Functional transfer training, LE strengthening/ROM, Therapeutic exercise, Endurance training, Patient/family training, Equipment eval/education, Bed mobility, Gait training, Spoke to nursing, Spoke to case management, OT  Equipment Recommended: Ursula Saldaña       See flowsheet documentation for full assessment, interventions and recommendations  Outcome: Progressing  Note:   Prognosis: Good  Problem List: Decreased strength, Decreased range of motion, Decreased endurance, Impaired balance, Decreased mobility, Decreased safety awareness  Assessment: Pt requires signif assist for all mobility, responds well to pacing  Returmed to bed after toiletting adn breif change  Limited activity tolerance  Continue to recommend short term in-pt rehab atd/c  Con't PT  Barriers to Discharge: (medical clearance)     Recommendation: Short-term skilled PT     PT - OK to Discharge: Yes    See flowsheet documentation for full assessment

## 2020-01-08 NOTE — OCCUPATIONAL THERAPY NOTE
Occupational Therapy Treatment Note     Patient Name: Milly MURILLO'S Date: 1/8/2020  Problem List  Principal Problem:    Acute on chronic diastolic congestive heart failure ([facility] )  Active Problems:    Hyperlipidemia    [facility] ([facility] )    Pacemaker             01/08/20 1131   Restrictions/Precautions   Weight Bearing Precautions Per Order No   Other Precautions Cognitive; Chair Alarm; Bed Alarm; Fall Risk   Pain Assessment   Pain Assessment No/denies pain   Pain Score No Pain   ADL   Grooming Assistance 5  Supervision/Setup   Grooming Deficit Supervision/safety;Verbal cueing;Wash/dry face;Brushing hair   LB Dressing Assistance 2  Maximal Assistance   LB Dressing Deficit Thread RLE into underwear; Thread LLE into underwear;Pull up over hips;Verbal cueing;Supervision/safety;Steadying   LB Dressing Comments Pt noted to rest forearms onto walker  Required tactile/verbal cues to improve posture  Bed Mobility   Rolling L 2  Maximal assistance   Additional items Assist x 1;Verbal cues; Bedrails;HOB elevated   Supine to Sit 2  Maximal assistance   Additional items Assist x 1;Verbal cues;HOB elevated; Bedrails   Transfers   Sit to Stand 3  Moderate assistance   Additional items Assist x 1;Verbal cues  (RW)   Stand to Sit 3  Moderate assistance   Additional items Assist x 1;Verbal cues;HOB elevated;Armrests   Stand pivot 3  Moderate assistance  (to chair)   Additional items Assist x 1;Verbal cues  (RW)   Cognition   Overall Cognitive Status Impaired   Arousal/Participation Alert   Attention Attends with cues to redirect   Orientation Level Oriented to person;Disoriented to place; Disoriented to time;Disoriented to situation   Memory Decreased short term memory;Decreased recall of recent events;Decreased recall of precautions;Decreased recall of biographical information   Following Commands Follows one step commands with increased time or repetition   Activity Tolerance   Activity Tolerance Patient limited by fatigue Medical Staff Made Aware PRABHU Zacarias   Assessment   Assessment Patient participated in Skilled OT session this date with interventions consisting of safety awareness and fall prevention techniques,  therapeutic activities to: increase activity tolerance, increase dynamic sit/ stand balance during functional activity , increase postural control, increase trunk control and increase OOB/ sitting tolerance   Patient agreeable to OT treatment session, upon arrival patient was found supine in bed  Treatment session as follows: performed bed mobility with max A x 1, sit<>stand with mod A x 1, bed>chair transfer with mod A x 1, LB dressing activity with max A x 1, and grooming activity with supervision  Patient continues to be functioning below baseline level, occupational performance remains limited secondary to factors listed above, and pt at increased risk for falls and injury  From OT standpoint, recommendation at time of d/c would be Short Term Rehab  Patient to benefit from continued Occupational Therapy treatment while in the hospital to address deficits as defined above and maximize level of functional independence with ADLs and functional mobility  Pt left with call bell in reach, tray table in reach, needs met, chairalarm activated, SCDs on  RN aware  Plan   Treatment Interventions ADL retraining;Functional transfer training;Cognitive reorientation; Endurance training;UE strengthening/ROM; Patient/family training;Equipment evaluation/education; Compensatory technique education;Continued evaluation; Activityengagement   Goal Expiration Date 01/15/20   OT Treatment Day 2   OT Frequency 2-3x/wk   Recommendation   OT Discharge Recommendation Short Term Rehab   OT - OK to Discharge Micheline Trivedi OTR/L

## 2020-01-08 NOTE — PLAN OF CARE
Problem: Potential for Falls  Goal: Patient will remain free of falls  Description  INTERVENTIONS:  - Assess patient frequently for physical needs  -  Identify cognitive and physical deficits and behaviors that affect risk of falls    -  [place] fall precautions as indicated by assessment   - Educate patient/family on patient safety including physical limitations  - Instruct patient to call for assistance with activity based on assessment  - Modify environment to reduce risk of injury  - Consider OT/PT consult to assist with strengthening/mobility  Outcome: Progressing     Problem: Prexisting or High Potential for Compromised Skin Integrity  Goal: Skin integrity is maintained or improved  Description  INTERVENTIONS:  - Identify patients at risk for skin breakdown  - Assess and monitor skin integrity  - Assess and monitor nutrition and hydration status  - Monitor labs   - Assess for incontinence   - Turn and reposition patient  - Assist with mobility/ambulation  - Relieve pressure over bony prominences  - Avoid friction and shearing  - Provide appropriate hygiene as needed including keeping skin clean and dry  - Evaluate need for skin moisturizer/barrier cream  - Collaborate with interdisciplinary team   - Patient/family teaching  - Consider wound care consult   Outcome: Progressing     Problem: RESPIRATORY - ADULT  Goal: Achieves optimal ventilation and oxygenation  Description  INTERVENTIONS:  - Assess for changes in respiratory status  - Assess for changes in mentation and behavior  - Position to facilitate oxygenation and minimize respiratory effort  - Oxygen administered by appropriate delivery if ordered  - Initiate smoking cessation education as indicated  - Encourage broncho-pulmonary hygiene including cough, deep breathe, Incentive Spirometry  - Assess the need for suctioning and aspirate as needed  - Assess and instruct to report SOB or any respiratory difficulty  - Respiratory Therapy support as indicated  Outcome: Progressing     Problem: PAIN - ADULT  Goal: Verbalizes/displays adequate comfort level or baseline comfort level  Description  Interventions:  - Encourage patient to monitor pain and request assistance  - Assess pain using appropriate pain scale  - Administer analgesics based on type and severity of pain and evaluate response  - Implement non-pharmacological measures as appropriate and evaluate response  - Consider cultural and social influences on pain and pain management  - Notify physician/advanced practitioner if interventions unsuccessful or patient reports new pain  Outcome: Progressing     Problem: SAFETY ADULT  Goal: Maintain or return to baseline ADL function  Description  INTERVENTIONS:  -  Assess patient's ability to carry out ADLs; assess patient's baseline for ADL function and identify physical deficits which impact ability to perform ADLs (bathing, care of mouth/teeth, toileting, grooming, dressing, etc )  - Assess/evaluate cause of self-care deficits   - Assess range of motion  - Assess patient's mobility; develop plan if impaired  - Assess patient's need for assistive devices and provide as appropriate  - Encourage maximum independence but intervene and supervise when necessary  - Involve family in performance of ADLs  - Assess for home care needs following discharge   - Consider OT consult to assist with ADL evaluation and planning for discharge  - Provide patient education as appropriate  Outcome: Progressing  Goal: Maintain or return mobility status to optimal level  Description  INTERVENTIONS:  - Assess patient's baseline mobility status (ambulation, transfers, stairs, etc )    - Identify cognitive and physical deficits and behaviors that affect mobility  - Identify mobility aids required to assist with transfers and/or ambulation (gait belt, sit-to-stand, lift, walker, cane, etc )  - [place] fall precautions as indicated by assessment  - Record patient progress and toleration of activity level on Mobility SBAR; progress patient to next Phase/Stage  - Instruct patient to call for assistance with activity based on assessment  - Consider rehabilitation consult to assist with strengthening/weightbearing, etc   Outcome: Progressing     Problem: DISCHARGE PLANNING  Goal: Discharge to home or other facility with appropriate resources  Description  INTERVENTIONS:  - Identify barriers to discharge w/patient and caregiver  - Arrange for needed discharge resources and transportation as appropriate  - Identify discharge learning needs (meds, wound care, etc )  - Arrange for interpretive services to assist at discharge as needed  - Refer to Case Management Department for coordinating discharge planning if the patient needs post-hospital services based on physician/advanced practitioner order or complex needs related to functional status, cognitive ability, or social support system  Outcome: Progressing     Problem: Knowledge Deficit  Goal: Patient/family/caregiver demonstrates understanding of disease process, treatment plan, medications, and discharge instructions  Description  Complete learning assessment and assess knowledge base  Interventions:  - Provide teaching at level of understanding  - Provide teaching via preferred learning methods  Outcome: Progressing     Problem: Nutrition/Hydration-ADULT  Goal: Nutrient/Hydration intake appropriate for improving, restoring or maintaining nutritional needs  Description  Monitor and assess patient's nutrition/hydration status for malnutrition  Collaborate with interdisciplinary team and initiate plan and interventions as ordered  Monitor patient's weight and dietary intake as ordered or per policy  Utilize nutrition screening tool and intervene as necessary  Determine patient's food preferences and provide high-protein, high-caloric foods as appropriate       INTERVENTIONS:  - Monitor oral intake, urinary output, labs, and treatment plans  - Assess nutrition and hydration status and recommend course of action  - Evaluate amount of meals eaten  - Assist patient with eating if necessary   - Allow adequate time for meals  - Recommend/ encourage appropriate diets, oral nutritional supplements, and vitamin/mineral supplements  - Order, calculate, and assess calorie counts as needed  - Recommend, monitor, and adjust tube feedings and TPN/PPN based on assessed needs  - Assess need for intravenous fluids  - Provide specific nutrition/hydration education as appropriate  - Include patient/family/caregiver in decisions related to nutrition  Outcome: Progressing

## 2020-01-08 NOTE — ASSESSMENT & PLAN NOTE
Wt Readings from Last 3 Encounters:   01/08/20 98 kg (216 lb 0 8 oz)   01/07/18 93 4 kg (206 lb)   11/01/17 92 9 kg (204 lb 12 9 oz)       · Acute on chronic diastolic CHF, present on admission as evidenced by shortness of breath, cough, elevated proBNP 2303, chest x-ray finding of pulmonary edema  · Last E[facility] (10/10/17) [facility]- EF 60%, mild Aortic stenosis  · Continue diuresis with Lasix 80 mg IV twice daily, consider increasing if patient weight plateus  · Recorded weight in the ED - 218lbs  · Today 216  · Last recorded weight 11/1/17 - 204lbs- goal  · Daily weight and I&Os  · Strict Is/Os- [place] cathter as patient is incontinent  · Repeat ECHO ordered this admission, 60% EF, no abnormalities, peak pressure 30 mm Hg  · Cardiology consulted  · Currently on 2L NC, keep O2 sats > 92% with supplemental oxygen as needed  · Continue with incentive spirometry, encourage out of bed to chair  · DuoNeb inhalers q i d   · P T /OT

## 2020-01-08 NOTE — PLAN OF CARE
Problem: OCCUPATIONAL THERAPY ADULT  Goal: Performs self-care activities at highest level of function for planned discharge setting  See evaluation for individualized goals  Description  Treatment Interventions: ADL retraining, Functional transfer training, Endurance training, Cognitive reorientation, Patient/family training, Equipment evaluation/education, Compensatory technique education, Energy conservation          See flowsheet documentation for full assessment, interventions and recommendations  Outcome: Progressing  Note:   Limitation: Decreased ADL status, Decreased Safe judgement during ADL, Decreased cognition, Decreased endurance, Decreased self-care trans, Decreased high-level ADLs  Prognosis: Good  Assessment: Patient participated in Skilled OT session this date with interventions consisting of safety awareness and fall prevention techniques,  therapeutic activities to: increase activity tolerance, increase dynamic sit/ stand balance during functional activity , increase postural control, increase trunk control and increase OOB/ sitting tolerance   Patient agreeable to OT treatment session, upon arrival patient was found supine in bed  Treatment session as follows: performed bed mobility with max A x 1, sit<>stand with mod A x 1, bed>chair transfer with mod A x 1, LB dressing activity with max A x 1, and grooming activity with supervision  Patient continues to be functioning below baseline level, occupational performance remains limited secondary to factors listed above, and pt at increased risk for falls and injury  From OT standpoint, recommendation at time of d/c would be Short Term Rehab  Patient to benefit from continued Occupational Therapy treatment while in the hospital to address deficits as defined above and maximize level of functional independence with ADLs and functional mobility   Pt left with call bell in reach, tray table in reach, needs met, chairalarm activated, SCDs on  RN aware       OT Discharge Recommendation: Short Term Rehab  OT - OK to Discharge: No

## 2020-01-09 VITALS
RESPIRATION RATE: 18 BRPM | BODY MASS INDEX: 34.23 KG/M2 | SYSTOLIC BLOOD PRESSURE: 106 MMHG | WEIGHT: 205.69 LBS | HEART RATE: 56 BPM | OXYGEN SATURATION: 90 % | DIASTOLIC BLOOD PRESSURE: 57 MMHG | TEMPERATURE: 97.8 F

## 2020-01-09 LAB
ANION GAP SERPL CALCULATED.3IONS-SCNC: 7 MMOL/L (ref 4–13)
BASOPHILS # BLD AUTO: 0.04 THOUSANDS/ΜL (ref 0–0.1)
BASOPHILS NFR BLD AUTO: 1 % (ref 0–1)
BUN SERPL-MCNC: 28 MG/DL (ref 5–25)
CALCIUM SERPL-MCNC: 8.9 MG/DL (ref 8.3–10.1)
CHLORIDE SERPL-SCNC: 102 MMOL/L (ref 100–108)
CO2 SERPL-SCNC: 32 MMOL/L (ref 21–32)
CREAT SERPL-MCNC: 0.86 MG/DL (ref 0.6–1.3)
EOSINOPHIL # BLD AUTO: 0.19 THOUSAND/ΜL (ref 0–0.61)
EOSINOPHIL NFR BLD AUTO: 3 % (ref 0–6)
ERYTHROCYTE [DISTWIDTH] IN BLOOD BY AUTOMATED COUNT: 13.2 % (ref 11.6–15.1)
GFR SERPL CREATININE-BSD FRML MDRD: 60 ML/MIN/1.73SQ M
GLUCOSE SERPL-MCNC: 110 MG/DL (ref 65–140)
HCT VFR BLD AUTO: 44.2 % (ref 34.8–46.1)
HGB BLD-MCNC: 14.8 G/DL (ref 11.5–15.4)
IMM GRANULOCYTES # BLD AUTO: 0.01 THOUSAND/UL (ref 0–0.2)
IMM GRANULOCYTES NFR BLD AUTO: 0 % (ref 0–2)
LYMPHOCYTES # BLD AUTO: 1.55 THOUSANDS/ΜL (ref 0.6–4.47)
LYMPHOCYTES NFR BLD AUTO: 23 % (ref 14–44)
MCH RBC QN AUTO: 30.9 PG (ref 26.8–34.3)
MCHC RBC AUTO-ENTMCNC: 33.5 G/DL (ref 31.4–37.4)
MCV RBC AUTO: 92 FL (ref 82–98)
MONOCYTES # BLD AUTO: 0.75 THOUSAND/ΜL (ref 0.17–1.22)
MONOCYTES NFR BLD AUTO: 11 % (ref 4–12)
NEUTROPHILS # BLD AUTO: 4.31 THOUSANDS/ΜL (ref 1.85–7.62)
NEUTS SEG NFR BLD AUTO: 62 % (ref 43–75)
NRBC BLD AUTO-RTO: 0 /100 WBCS
PLATELET # BLD AUTO: 232 THOUSANDS/UL (ref 149–390)
PMV BLD AUTO: 10.9 FL (ref 8.9–12.7)
POTASSIUM SERPL-SCNC: 3.6 MMOL/L (ref 3.5–5.3)
RBC # BLD AUTO: 4.79 MILLION/UL (ref 3.81–5.12)
SODIUM SERPL-SCNC: 141 MMOL/L (ref 136–145)
WBC # BLD AUTO: 6.85 THOUSAND/UL (ref 4.31–10.16)

## 2020-01-09 PROCEDURE — 99239 HOSP IP/OBS DSCHRG MGMT >30: CPT | Performed by: PHYSICIAN ASSISTANT

## 2020-01-09 PROCEDURE — 85025 COMPLETE CBC W/AUTO DIFF WBC: CPT | Performed by: PHYSICIAN ASSISTANT

## 2020-01-09 PROCEDURE — 94760 N-INVAS EAR/PLS OXIMETRY 1: CPT

## 2020-01-09 PROCEDURE — 99232 SBSQ HOSP IP/OBS MODERATE 35: CPT | Performed by: INTERNAL MEDICINE

## 2020-01-09 PROCEDURE — 80048 BASIC METABOLIC PNL TOTAL CA: CPT | Performed by: PHYSICIAN ASSISTANT

## 2020-01-09 PROCEDURE — 94640 AIRWAY INHALATION TREATMENT: CPT

## 2020-01-09 RX ORDER — BUMETANIDE 2 MG/1
2 TABLET ORAL DAILY
Refills: 0
Start: 2020-01-09

## 2020-01-09 RX ADMIN — MELATONIN 2000 UNITS: at 08:11

## 2020-01-09 RX ADMIN — IPRATROPIUM BROMIDE AND ALBUTEROL SULFATE 3 ML: 2.5; .5 SOLUTION RESPIRATORY (INHALATION) at 07:37

## 2020-01-09 RX ADMIN — Medication 400 MG: at 08:11

## 2020-01-09 RX ADMIN — BUMETANIDE 2 MG: 0.25 INJECTION INTRAMUSCULAR; INTRAVENOUS at 08:11

## 2020-01-09 RX ADMIN — METOPROLOL TARTRATE 25 MG: 25 TABLET ORAL at 08:11

## 2020-01-09 RX ADMIN — APIXABAN 5 MG: 5 TABLET, FILM COATED ORAL at 08:11

## 2020-01-09 RX ADMIN — OXYBUTYNIN CHLORIDE 10 MG: 5 TABLET, EXTENDED RELEASE ORAL at 08:11

## 2020-01-09 NOTE — PROGRESS NOTES
Patient is good spirits but some confusion was evident according to daughter  01/09/20 1200   Stress Factors   Patient Stress Factors None identified   Coping Responses   Patient Coping Accepting   Plan of Care   Assessment Completed by: Unit visit; Other (Comment)  [facility]  [facility]    Daughter at bedside  Patient preparing for transfer to rehab

## 2020-01-09 NOTE — ASSESSMENT & PLAN NOTE
Wt Readings from Last 3 Encounters:   01/09/20 93 3 kg (205 lb 11 oz)   01/07/18 93 4 kg (206 lb)   11/01/17 92 9 kg (204 lb 12 9 oz)       · Acute on chronic diastolic CHF, present on admission as evidenced by shortness of breath, cough, elevated proBNP 2303, chest x-ray finding of pulmonary edema  · Last ECHO (10/10/17) at [facility]- EF 60%, mild Aortic stenosis  · Patient improved dramatically on Bumex, will transition to Bumex 2mg daily for discharge  · Recorded weight in the ED - 218lbs  · Today 205  · Last recorded weight 11/1/17 - 204lbs- goal  · Daily weight and I&Os  · Strict Is/Os- [facility] cathter as patient is incontinent  · Repeat ECHO ordered this admission, 60% EF, no abnormalities, peak pressure 30 mm Hg  · Cardiology consulted  · Currently on 2L NC, keep O2 sats > 92% with supplemental oxygen as needed  · Continue with incentive spirometry, encourage out of bed to chair  · DuoNeb inhalers q i d   · P T /OT

## 2020-01-09 NOTE — DISCHARGE SUMMARY
[facility]  Discharge- Sherell Leyden 5/16/1929, 80 y o  female MRN: [id number]  Unit/Bed#: -01 Encounter: [id number]  Primary Care Provider: OH Bailey   Date and time admitted to hospital: 1/5/2020 11:13 AM    * Acute on chronic diastolic congestive heart failure ([facility] [address] )  Assessment & Plan  Wt Readings from Last 3 Encounters:   01/09/20 93 3 kg (205 lb 11 oz)   01/07/18 93 4 kg (206 lb)   11/01/17 92 9 kg (204 lb 12 9 oz)       · Acute on chronic diastolic CHF, present on admission as evidenced by shortness of breath, cough, elevated proBNP 2303, chest x-ray finding of pulmonary edema  · Last ECHO (10/10/17) at [facility]- EF 60%, mild Aortic stenosis  · Patient improved dramatically on Bumex, will transition to Bumex 2mg daily for discharge  · Recorded weight in the ED - 218lbs  · Today 205  · Last recorded weight 11/1/17 - 204lbs- goal  · Daily weight and I&Os  · Strict Is/Os- purwick cathter as patient is incontinent  · Repeat ECHO ordered this admission, 60% EF, no abnormalities, peak pressure 30 mm Hg  · Cardiology consulted  · Currently on 2L NC, keep O2 sats > 92% with supplemental oxygen as needed  · Continue with incentive spirometry, encourage out of bed to chair  · DuoNeb inhalers q i d   · P T /OT    Pacemaker  Assessment & Plan  · Status post pacemaker insertion    A-fib ([facility] )  Assessment & Plan  · History of Afib  · EKG shows rate controlled Afib  · Continue with home medications Lopressor 25 mg twice daily and Eliquis    Hyperlipidemia  Assessment & Plan  · History of hyperlipidemia on atorvastatin 20 mg daily        Discharging Physician / Practitioner: Brissa Gonzalez PA-C  PCP: Daniella Bailey  Admission Date:   Admission Orders (From admission, onward)     Ordered        01/05/20 1234  Inpatient Admission (expected length of stay for this patient Order details is greater than two midnights)  Once                   Discharge Date: 01/09/20    Resolved Problems  Date Reviewed: 1/9/2020    None          Consultations During Hospital Stay:  · Cardiology    Procedures Performed:   · Echocardiogram 1/6:  60% EF, no regional wall motion abnormality  Peak pressure of right ventricle 30 mmHg  Mildly dilated atria and mild regurgitation of mitral, aortic, tricuspid valves  Atrial fibrillation flow    Significant Findings / Test Results:   · X-ray right shoulder 1/7:  Relatively severe arthritis as mentioned  No fracture dislocation  · X-ray pelvis 1/7:  Osteoarthritis  No fracture  · CXR 1/7:  Persistent density right lung field productive slightly lower probably due to altered patient position, 22 x 25 mm  A prior CT of chest from 10/23/2017 the seem to be very low attenuation possibly fatty focus new heart border  Likely benign  · CT head 1/7:  No acute intracranial abnormality  Chronic microangiopathic ischemic changes of white matter  Atrophy  · CT cervical spine 1/7:  No cervical spine fracture or traumatic malalignment  Monitor spondylosis  · CXR 1/5:  Question mild central vascular congestion and trace left pleural effusion  · ProBNP on admission: 2,303    Incidental Findings:   · See above    Test Results Pending at Discharge (will require follow up): · None     Outpatient Tests Requested:  · None    Complications:  None    Reason for Admission:  Shortness of breath with cough    Hospital Course: Meera Cruz is a 80 y o  female patient with past medical history of diastolic CHF, atrial fibrillation on Eliquis, pacemaker in place who originally presented to the hospital on 1/5/2020 due to cough with shortness of breath  Nursing home staff reported increasing shortness of breath and weakness  Cough is nonproductive  Imaging as above  Patient was found to be in CHF exacerbation  Dry weight was 204, patient weighed 218 on admission  Patient was saturating at 90% on room air and required nasal cannula    Patient was started on IV diuretics and Cardiology was consulted  Respiratory status gradually improved as fluid overload resolved  Patient was transitioned to Bumex this admission as she responded better than she had to Lasix  Will discharge patient on 2 mg Bumex daily  PT/OT evaluated patient and determine she is appropriate for inpatient rehab  Plan to discharge to [facility] today  Patient should follow up with Cardiology and primary care provider outpatient  No longer requiring supplemental oxygen on day of discharge  Please see above list of diagnoses and related plan for additional information  Condition at Discharge: stable     Discharge Day Visit / Exam:     Subjective:  Patient reports she feels well today  Denies respiratory distress  Still feels somewhat weak and is agreeable to participate in rehab  Vitals: Blood Pressure: 106/57 (01/09/20 0756)  Pulse: 56 (01/09/20 0756)  Temperature: 97 8 °F (36 6 °C) (01/09/20 0756)  Temp Source: Oral (01/08/20 4518)  Respirations: 18 (01/09/20 0756)  Weight - Scale: 93 3 kg (205 lb 11 oz) (01/09/20 0600)  SpO2: 90 % (01/09/20 0756)  Exam:   Physical Exam   Constitutional: She appears well-developed and well-nourished  No distress  HENT:   Head: Normocephalic and atraumatic  Eyes: Conjunctivae are normal  No scleral icterus  Cardiovascular: Normal rate  An irregularly irregular rhythm present  No murmur heard  Pulmonary/Chest: Effort normal and breath sounds normal  No respiratory distress  She has no wheezes  She has no rales  Abdominal: Soft  She exhibits no distension  There is no tenderness  Musculoskeletal: She exhibits no edema  Neurological: She is alert  Skin: Skin is warm and dry  No erythema  Psychiatric: She has a normal mood and affect  Nursing note and vitals reviewed  Discussion with Family:   Discussed discharge plan with patient at bedside and with patient's daughter over the phone  Answered all questions to the best of my ability  Discharge instructions/Information to patient and family:   See after visit summary for information provided to patient and family  Provisions for Follow-Up Care:  See after visit summary for information related to follow-up care and any pertinent home health orders  Disposition:     Other [place] [facility]:   · Not Applicable to this Patient - Not Applicable to this Patient    Planned Readmission: None     Discharge Statement:  I spent 65 minutes discharging the patient  This time was spent on the day of discharge  I had direct contact with the patient on the day of discharge  Greater than 50% of the total time was spent examining patient, answering all patient questions, arranging and discussing plan of care with patient as well as directly providing post-discharge instructions  Additional time then spent on discharge activities  Discharge Medications:  See after visit summary for reconciled discharge medications provided to patient and family        ** Please Note: This note has been constructed using a voice recognition system **

## 2020-01-09 NOTE — PLAN OF CARE
Problem: Potential for Falls  Goal: Patient will remain free of falls  Description  INTERVENTIONS:  - Assess patient frequently for physical needs  -  Identify cognitive and physical deficits and behaviors that affect risk of falls    -  [place] fall precautions as indicated by assessment   - Educate patient/family on patient safety including physical limitations  - Instruct patient to call for assistance with activity based on assessment  - Modify environment to reduce risk of injury  - Consider OT/PT consult to assist with strengthening/mobility  Outcome: Progressing     Problem: Prexisting or High Potential for Compromised Skin Integrity  Goal: Skin integrity is maintained or improved  Description  INTERVENTIONS:  - Identify patients at risk for skin breakdown  - Assess and monitor skin integrity  - Assess and monitor nutrition and hydration status  - Monitor labs   - Assess for incontinence   - Turn and reposition patient  - Assist with mobility/ambulation  - Relieve pressure over bony prominences  - Avoid friction and shearing  - Provide appropriate hygiene as needed including keeping skin clean and dry  - Evaluate need for skin moisturizer/barrier cream  - Collaborate with interdisciplinary team   - Patient/family teaching  - Consider wound care consult   Outcome: Progressing     Problem: RESPIRATORY - ADULT  Goal: Achieves optimal ventilation and oxygenation  Description  INTERVENTIONS:  - Assess for changes in respiratory status  - Assess for changes in mentation and behavior  - Position to facilitate oxygenation and minimize respiratory effort  - Oxygen administered by appropriate delivery if ordered  - Initiate smoking cessation education as indicated  - Encourage broncho-pulmonary hygiene including cough, deep breathe, Incentive Spirometry  - Assess the need for suctioning and aspirate as needed  - Assess and instruct to report SOB or any respiratory difficulty  - Respiratory Therapy support as indicated  Outcome: Progressing     Problem: PAIN - ADULT  Goal: Verbalizes/displays adequate comfort level or baseline comfort level  Description  Interventions:  - Encourage patient to monitor pain and request assistance  - Assess pain using appropriate pain scale  - Administer analgesics based on type and severity of pain and evaluate response  - Implement non-pharmacological measures as appropriate and evaluate response  - Consider cultural and social influences on pain and pain management  - Notify physician/advanced practitioner if interventions unsuccessful or patient reports new pain  Outcome: Progressing     Problem: SAFETY ADULT  Goal: Maintain or return to baseline ADL function  Description  INTERVENTIONS:  -  Assess patient's ability to carry out ADLs; assess patient's baseline for ADL function and identify physical deficits which impact ability to perform ADLs (bathing, care of mouth/teeth, toileting, grooming, dressing, etc )  - Assess/evaluate cause of self-care deficits   - Assess range of motion  - Assess patient's mobility; develop plan if impaired  - Assess patient's need for assistive devices and provide as appropriate  - Encourage maximum independence but intervene and supervise when necessary  - Involve family in performance of ADLs  - Assess for home care needs following discharge   - Consider OT consult to assist with ADL evaluation and planning for discharge  - Provide patient education as appropriate  Outcome: Progressing  Goal: Maintain or return mobility status to optimal level  Description  INTERVENTIONS:  - Assess patient's baseline mobility status (ambulation, transfers, stairs, etc )    - Identify cognitive and physical deficits and behaviors that affect mobility  - Identify mobility aids required to assist with transfers and/or ambulation (gait belt, sit-to-stand, lift, walker, cane, etc )  - [place] fall precautions as indicated by assessment  - Record patient progress and toleration of activity level on Mobility SBAR; progress patient to next Phase/Stage  - Instruct patient to call for assistance with activity based on assessment  - Consider rehabilitation consult to assist with strengthening/weightbearing, etc   Outcome: Progressing     Problem: DISCHARGE PLANNING  Goal: Discharge to home or other facility with appropriate resources  Description  INTERVENTIONS:  - Identify barriers to discharge w/patient and caregiver  - Arrange for needed discharge resources and transportation as appropriate  - Identify discharge learning needs (meds, wound care, etc )  - Arrange for interpretive services to assist at discharge as needed  - Refer to Case Management Department for coordinating discharge planning if the patient needs post-hospital services based on physician/advanced practitioner order or complex needs related to functional status, cognitive ability, or social support system  Outcome: Progressing     Problem: Knowledge Deficit  Goal: Patient/family/caregiver demonstrates understanding of disease process, treatment plan, medications, and discharge instructions  Description  Complete learning assessment and assess knowledge base  Interventions:  - Provide teaching at level of understanding  - Provide teaching via preferred learning methods  Outcome: Progressing     Problem: Nutrition/Hydration-ADULT  Goal: Nutrient/Hydration intake appropriate for improving, restoring or maintaining nutritional needs  Description  Monitor and assess patient's nutrition/hydration status for malnutrition  Collaborate with interdisciplinary team and initiate plan and interventions as ordered  Monitor patient's weight and dietary intake as ordered or per policy  Utilize nutrition screening tool and intervene as necessary  Determine patient's food preferences and provide high-protein, high-caloric foods as appropriate       INTERVENTIONS:  - Monitor oral intake, urinary output, labs, and treatment plans  - Assess nutrition and hydration status and recommend course of action  - Evaluate amount of meals eaten  - Assist patient with eating if necessary   - Allow adequate time for meals  - Recommend/ encourage appropriate diets, oral nutritional supplements, and vitamin/mineral supplements  - Order, calculate, and assess calorie counts as needed  - Recommend, monitor, and adjust tube feedings and TPN/PPN based on assessed needs  - Assess need for intravenous fluids  - Provide specific nutrition/hydration education as appropriate  - Include patient/family/caregiver in decisions related to nutrition  Outcome: Progressing

## 2020-01-09 NOTE — PROGRESS NOTES
Pastoral Care Progress Note    2020  Patient: Jose Begum : 1929  Admission Date & Time: 2020 1113  MRN: [id number] [facility]: [id number]                     Chaplaincy Interventions Utilized:   Empowerment: Encouraged focus on present and Encouraged self-care    Exploration: Explored emotional needs & resources, Explored spiritual needs & resources and Facilitated story telling    Relationship Building: Listened empathically and Hospitality    Chaplaincy Outcomes Achieved:  Expressed gratitude and Expressed humor    Spiritual Coping Strategies Utilized:   Connectedness and Spiritual comfort       20 1200   Stress Factors   Patient Stress Factors None identified   Coping Responses   Patient Coping Accepting   Plan of Care   Assessment Completed by: Unit visit; Other (Comment)  (Follow up visit   Family at [facility])

## 2020-01-09 NOTE — PROGRESS NOTES
Progress Note - Cardiology   Charisse Batch [place] 80 y o  female MRN: [id number]  Unit/Bed#: -Ivan Encounter: [id number]      Assessment/Recommendations/Discussion:     1  Acute on chronic diastolic heart failure  2  Permanent atrial fibrillation  3  Pacemaker in situ    · Breathing comfortably without oxygen support  Renal function stable with good urine output  Can change Bumex to 2 mg p o  Daily and discharge on the same  · Heart rate seems to be controlled on auscultation, now off telemetry   · Continue Eliquis 5 mg twice daily, atorvastatin 20 mg, metoprolol tartrate 25 mg twice daily  · F/U with primary cardiologist after d/c      Subjective:  Patient seen and examined, feels well, denies shortness of breath, orthopnea          Physical Exam:  GEN:  NAD  HEENT:  MMM, NCAT, pink conjunctiva, EOMI, nonicteric sclera  CV:  NO JVD/HJR, RR, NO M/R/G, +S1/S2, NO PARASTERNAL HEAVE/THRILL, NO LE EDEMA, NO HEPATIC SYSTOLIC PULSATION, WARM EXTREMITIES  RESP:  CTAB/L  ABD:  SOFT, NT, NO GROSS ORGANOMEGALY        Vitals:   /57   Pulse 56   Temp 97 8 °F (36 6 °C)   Resp 18   Wt 93 3 kg (205 lb 11 oz)   SpO2 90%   Breastfeeding?  No   BMI 34 23 kg/m²   Vitals:    01/08/20 0600 01/09/20 0600   Weight: 98 kg (216 lb 0 8 oz) 93 3 kg (205 lb 11 oz)       Intake/Output Summary (Last 24 hours) at 1/9/2020 1010  Last data filed at 1/8/2020 2348  Gross per 24 hour   Intake 200 ml   Output 300 ml   Net -100 ml       Lab Results:  Results from last 7 days   Lab Units 01/09/20  0506   WBC Thousand/uL 6 85   HEMOGLOBIN g/dL 14 8   HEMATOCRIT % 44 2   PLATELETS Thousands/uL 232     Results from last 7 days   Lab Units 01/09/20  0506  01/05/20  1128   POTASSIUM mmol/L 3 6   < > 4 4   CHLORIDE mmol/L 102   < > 101   CO2 mmol/L 32   < > 28   BUN mg/dL 28*   < > 15   CREATININE mg/dL 0 86   < > 1 01   CALCIUM mg/dL 8 9   < > 9 4   ALK PHOS U/L  --   --  86   ALT U/L  --   --  22   AST U/L  --   --  19    < > = values in this interval not displayed  Results from last 7 days   Lab Units 01/09/20  0506   POTASSIUM mmol/L 3 6   CHLORIDE mmol/L 102   CO2 mmol/L 32   BUN mg/dL 28*   CREATININE mg/dL 0 86   CALCIUM mg/dL 8 9           Medications:    Current Facility-Administered Medications:     acetaminophen (TYLENOL) tablet 650 mg, 650 mg, Oral, Q8H PRN, Blayne Galeas MD    albuterol inhalation solution 2 5 mg, 2 5 mg, Nebulization, Q6H PRN, Blayne Galeas MD    apixaban (ELIQUIS) tablet 5 mg, 5 mg, Oral, BID, Rojelio Arciniega MD, 5 mg at 01/09/20 2874    atorvastatin (LIPITOR) tablet 20 mg, 20 mg, Oral, Daily With Tish Gaines MD, 20 mg at 01/08/20 1725    bumetanide (BUMEX) injection 2 mg, 2 mg, Intravenous, BID, Christian Ribera MD, 2 mg at 01/09/20 5363    cholecalciferol (VITAMIN D3) tablet 2,000 Units, 2,000 Units, Oral, Daily, Rojelio Arciniega MD, 2,000 Units at 01/09/20 0811    ipratropium-albuterol (DUO-NEB) 0 5-2 5 mg/3 mL inhalation solution 3 mL, 3 mL, Nebulization, TID, Blayne Galeas MD, 3 mL at 01/09/20 0737    magnesium oxide (MAG-OX) tablet 400 mg, 400 mg, Oral, BID, Rojelio Arciniega MD, 400 mg at 01/09/20 9431    metoprolol tartrate (LOPRESSOR) tablet 25 mg, 25 mg, Oral, BID, Rojelio Arciniega MD, 25 mg at 01/09/20 4300    oxybutynin (DITROPAN-XL) 24 hr tablet 10 mg, 10 mg, Oral, Daily, Rojelio Arciniega MD, 10 mg at 01/09/20 0811    senna-docusate sodium (SENOKOT S) 8 6-50 mg per tablet 1 tablet, 1 tablet, Oral, PRN, Rojelio Arciniega MD    This note was completed in part utilizing [employer] Software  Grammatical errors, random word insertions, spelling mistakes, and incomplete sentences may be an occasional consequence of this system secondary to software limitations, ambient noise, and hardware issues    If you have any questions or concerns about the content, text, or information contained within the body of this dictation, please contact the provider for clarification

## 2020-01-09 NOTE — SOCIAL WORK
Patient is medically cleared for discharge  Bed is available at [facility]  Auth obtained from Jimy Payne 65  [facility] to transport patient to [facility] today at 1300  Called and notified Jesika Che at [facility]  Patients daughter Earnstine Clause is at bedside and aware

## 2020-01-09 NOTE — NURSING NOTE
Upon entering room pt is AAO to person, lungs diminished and 92% on RA,  And positive PMS in all extremities

## 2020-01-10 ENCOUNTER — DOCUMENTATION (OUTPATIENT)
Dept: FAMILY MEDICINE CLINIC | Facility: HOSPITAL | Age: 85
End: 2020-01-10

## 2020-08-07 ENCOUNTER — APPOINTMENT (EMERGENCY)
Dept: CT IMAGING | Facility: HOSPITAL | Age: 85
End: 2020-08-07
Payer: COMMERCIAL

## 2020-08-07 ENCOUNTER — APPOINTMENT (EMERGENCY)
Dept: RADIOLOGY | Facility: HOSPITAL | Age: 85
End: 2020-08-07
Payer: COMMERCIAL

## 2020-08-07 ENCOUNTER — HOSPITAL ENCOUNTER (EMERGENCY)
Facility: HOSPITAL | Age: 85
Discharge: HOME | End: 2020-08-07
Attending: EMERGENCY MEDICINE | Admitting: EMERGENCY MEDICINE
Payer: COMMERCIAL

## 2020-08-07 VITALS
HEART RATE: 52 BPM | RESPIRATION RATE: 20 BRPM | OXYGEN SATURATION: 95 % | SYSTOLIC BLOOD PRESSURE: 126 MMHG | DIASTOLIC BLOOD PRESSURE: 59 MMHG | TEMPERATURE: 98.6 F

## 2020-08-07 DIAGNOSIS — S40.211A ABRASION OF RIGHT SHOULDER, INITIAL ENCOUNTER: ICD-10-CM

## 2020-08-07 DIAGNOSIS — S51.811A SKIN TEAR OF RIGHT FOREARM WITHOUT COMPLICATION, INITIAL ENCOUNTER: ICD-10-CM

## 2020-08-07 DIAGNOSIS — R91.1 PULMONARY NODULE: ICD-10-CM
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
## 2020-08-07 LAB
ANION GAP SERPL CALCULATED.3IONS-SCNC: 7 MMOL/L (ref 4–13)
BASOPHILS # BLD AUTO: 0.05 THOUSANDS/ΜL (ref 0–0.1)
BASOPHILS NFR BLD AUTO: 1 % (ref 0–1)
BUN SERPL-MCNC: 9 MG/DL (ref 5–25)
CALCIUM SERPL-MCNC: 9 MG/DL (ref 8.3–10.1)
CHLORIDE SERPL-SCNC: 101 MMOL/L (ref 100–108)
CO2 SERPL-SCNC: 32 MMOL/L (ref 21–32)
CREAT SERPL-MCNC: 0.81 MG/DL (ref 0.6–1.3)
EOSINOPHIL # BLD AUTO: 0.12 THOUSAND/ΜL (ref 0–0.61)
EOSINOPHIL NFR BLD AUTO: 2 % (ref 0–6)
ERYTHROCYTE [DISTWIDTH] IN BLOOD BY AUTOMATED COUNT: 14.2 % (ref 11.6–15.1)
GFR SERPL CREATININE-BSD FRML MDRD: 64 ML/MIN/1.73SQ M
GLUCOSE SERPL-MCNC: 100 MG/DL (ref 65–140)
HCT VFR BLD AUTO: 41.4 % (ref 34.8–46.1)
HGB BLD-MCNC: 13.7 G/DL (ref 11.5–15.4)
IMM GRANULOCYTES # BLD AUTO: 0.02 THOUSAND/UL (ref 0–0.2)
IMM GRANULOCYTES NFR BLD AUTO: 0 % (ref 0–2)
LYMPHOCYTES # BLD AUTO: 1.34 THOUSANDS/ΜL (ref 0.6–4.47)
LYMPHOCYTES NFR BLD AUTO: 21 % (ref 14–44)
MCH RBC QN AUTO: 31.4 PG (ref 26.8–34.3)
MCHC RBC AUTO-ENTMCNC: 33.1 G/DL (ref 31.4–37.4)
MCV RBC AUTO: 95 FL (ref 82–98)
MONOCYTES # BLD AUTO: 0.59 THOUSAND/ΜL (ref 0.17–1.22)
MONOCYTES NFR BLD AUTO: 9 % (ref 4–12)
NEUTROPHILS # BLD AUTO: 4.13 THOUSANDS/ΜL (ref 1.85–7.62)
NEUTS SEG NFR BLD AUTO: 67 % (ref 43–75)
NRBC BLD AUTO-RTO: 0 /100 WBCS
PLATELET # BLD AUTO: 244 THOUSANDS/UL (ref 149–390)
PMV BLD AUTO: 10.6 FL (ref 8.9–12.7)
POTASSIUM SERPL-SCNC: 4 MMOL/L (ref 3.5–5.3)
RBC # BLD AUTO: 4.36 MILLION/UL (ref 3.81–5.12)
SODIUM SERPL-SCNC: 140 MMOL/L (ref 136–145)
WBC # BLD AUTO: 6.25 THOUSAND/UL (ref 4.31–10.16)

## 2020-08-07 PROCEDURE — 99285 EMERGENCY DEPT VISIT HI MDM: CPT | Performed by: EMERGENCY MEDICINE

## 2020-08-07 PROCEDURE — 71045 X-RAY EXAM CHEST 1 VIEW: CPT

## 2020-08-07 PROCEDURE — 80048 BASIC METABOLIC PNL TOTAL CA: CPT | Performed by: EMERGENCY MEDICINE

## 2020-08-07 PROCEDURE — 85025 COMPLETE CBC W/AUTO DIFF WBC: CPT | Performed by: EMERGENCY MEDICINE

## 2020-08-07 PROCEDURE — 99285 EMERGENCY DEPT VISIT HI MDM: CPT

## 2020-08-07 PROCEDURE — 36415 COLL VENOUS BLD VENIPUNCTURE: CPT | Performed by: EMERGENCY MEDICINE

## 2020-08-07 PROCEDURE — 70450 CT HEAD/BRAIN W/O DYE: CPT

## 2020-08-07 PROCEDURE — 72125 CT NECK SPINE W/O DYE: CPT

## 2020-08-07 PROCEDURE — G1004 CDSM NDSC: HCPCS

## 2020-08-07 PROCEDURE — 73030 X-RAY EXAM OF SHOULDER: CPT

## 2020-08-07 PROCEDURE — 93005 ELECTROCARDIOGRAM TRACING: CPT

## 2020-08-07 RX ORDER — BACITRACIN, NEOMYCIN, POLYMYXIN B 400; 3.5; 5 [USP'U]/G; MG/G; [USP'U]/G
1 OINTMENT TOPICAL ONCE
Status: COMPLETED | OUTPATIENT
Start: 2020-08-07 | End: 2020-08-07

## 2020-08-07 RX ADMIN — BACITRACIN, NEOMYCIN, POLYMYXIN B 1 SMALL APPLICATION: 400; 3.5; 5 OINTMENT TOPICAL at 17:56

## 2020-08-07 NOTE — DISCHARGE INSTRUCTIONS
Abrasion   WHAT YOU NEED TO KNOW:   An abrasion is a scrape on your skin  It happens when your skin rubs against a rough surface  Some examples of an abrasion include rug burn, a skinned elbow, or road rash  Abrasions can be many shapes and sizes  The wound may hurt, bleed, bruise, or swell  DISCHARGE INSTRUCTIONS:   Return to the emergency department if:   · The bleeding does not stop after 10 minutes of firm pressure  · You cannot rinse one or more foreign objects out of your wound  · You have red streaks on your skin coming from your wound  Contact your healthcare provider if:   · You have a fever or chills  · Your abrasion is red, warm, swollen, or draining pus  · You have questions or concerns about your condition or care  Care for your abrasion:   · Wash your hands and dry them with a clean towel  · Press a clean cloth against your wound to stop any bleeding  · Rinse your wound with a lot of clean water  Do not use harsh soap, alcohol, or iodine solutions  · Use a clean, wet cloth to remove any objects, such as small pieces of rocks or dirt  · Rub antibiotic ointment on your wound  This may help prevent infection and help your wound heal     · Cover the wound with a non-stick bandage  Change the bandage daily, and if gets wet or dirty  Follow up with your healthcare provider as directed:  Write down your questions so you remember to ask them during your visits  © 2017 [address] [employer] Information is for End User's use only and may not be sold, redistributed or otherwise used for commercial purposes  All illustrations and images included in CareNotes® are the copyrighted property of [employer]  or Clinton Flores  The above information is an  only  It is not intended as medical advice for individual conditions or treatments   Talk to your doctor, nurse or pharmacist before following any medical regimen to see if it is safe and effective for you  Pulmonary Nodules   WHAT YOU NEED TO KNOW:   Pulmonary nodules are areas of abnormal tissue in your lungs  You may not have any symptoms, or you may have chest tightness, a cough, chest pain, or shortness of breath  Nodules are usually found with an x-ray or CT scan  Most nodules are not cancerous  However, it is still important for you to return for follow-up testing to monitor your condition  DISCHARGE INSTRUCTIONS:   Call 911 for any of the following:   · You have severe shortness of breath or trouble breathing  · Your lips or nails look blue or pale  Return to the emergency department if:   · You cough up blood  · You suddenly feel lightheaded or are short of breath  · You have chest pain when you take a deep breath or cough  · You cannot think clearly  Contact your healthcare provider if:   · Your symptoms do not improve  · You have new symptoms  · You have questions or concerns about your condition or care  Follow up with your healthcare provider:  Your healthcare provider will refer you to a pulmonologist  Your pulmonologist will monitor your nodules for any change or growth  Nodules that grow quickly may require a biopsy to check for cancer  You may need to be monitored for 1 to 3 years  Write down your questions so you remember to ask them during your visits  Do not smoke:  Nicotine and other chemicals in cigarettes and cigars can cause lung damage or cancer  Stay away from others who smoke  Ask your healthcare provider for information if you or someone close to you currently smokes and needs help to quit  E-cigarettes or smokeless tobacco still contain nicotine  Talk to your healthcare provider before you use these products  © 2017 [address] [employer] Information is for End User's use only and may not be sold, redistributed or otherwise used for commercial purposes   All illustrations and images included in CareNotes® are the copyrighted property of A  [employer] A M [employer]  or Clinton Flores  The above information is an  only  It is not intended as medical advice for individual conditions or treatments  Talk to your doctor, nurse or pharmacist before following any medical regimen to see if it is safe and effective for you

## 2020-08-07 NOTE — ED PROVIDER NOTES
Emergency Department Trauma Note  Luly Gurrola 80 y o  female MRN: [id number]  Unit/Bed#: WALTER POOL/WALTER Encounter: [id number]      Trauma Alert: Trauma Acuity: Trauma Evaluation  Model of Arrival: Mode of Arrival: BLS via    Trauma Team: Current Providers  Attending Provider: Josi Whyte MD  Registered Nurse: Dax Irving RN  Registered Nurse: Berny Duenas RN  Consultants: None      History of Present Illness     Chief Complaint:   Chief Complaint   Patient presents with   Kennedy Mode     pt reports from nursing home via ems after being found on floor between her bed and night stand      HPI:  Luly Gurrola is a 80 y o  female who presents with unwitnessed fall  Mechanism:Details of Incident: pt was found by nursing home staff between her bed and night stand  unwitnessed fall  Injury Date: 08/07/20        80year old female with history of dementia brought by EMS after unwitnessed fall  Patient was found at her facility between her bed and nightstand with abrasion of the right shoulder  No blood thinners  Dementia at baseline  Patient does not recall details of the fall and is disoriented to time  She denies any current pain and has no complaints at this time  History provided by:  Nursing home  History limited by:  Dementia  Fall   Mechanism of injury: fall    Injury location:  Shoulder/arm  Shoulder/arm injury location:  R shoulder and R forearm  Incident location:  custodial  Time since incident: unwitnessed  Arrived directly from scene: yes    Fall:     Fall occurred: suspected to have fallen from the bed      Height of fall:  Ground level up to 3 feet     Point of impact:  Unable to specify  Suspicion of alcohol use: no    Suspicion of drug use: no    Tetanus status:  Up to date  Prior to arrival data:     Blood loss:  None    Responsiveness at scene:  Alert    Orientation at scene:  Person    Amnesic to event: yes      Medications administered:  None    Immobilization:  None  Current pain details:     Pain Severity:  No pain  Risk factors: CAD and CHF    Risk factors: no anticoagulation therapy      Review of Systems   Unable to perform ROS: Dementia       Historical Information     Immunizations:   Immunization History   Administered Date(s) Administered    INFLUENZA 11/28/2005, 10/13/2010, 11/01/2011, 09/18/2012, 10/14/2013, 11/01/2014    Influenza Split High Dose Preservative Free IM 10/11/2017    Pneumococcal Conjugate 13-Valent 01/12/2016    Pneumococcal Polysaccharide PPV23 10/13/2008    Td (adult), Unspecified 09/02/2004    Tdap 10/20/2016    Zoster 01/02/2009       Past Medical History:   Diagnosis Date    Amnesia     Cardiac disease     [facility]     Coronary artery disease     [facility]     D[facility]     Disease of thyroid gland     Edema     Hearing loss     Hyperlipidemia     Nocturia     Osteoarthritis     Osteoporosis     Otosclerosis     Pacemaker     Renal disorder     Vitamin D deficiency      History reviewed  No pertinent family history  Past Surgical History:   Procedure Laterality Date    CARDIAC PACEMAKER PLACEMENT      JOINT REPLACEMENT       Social History     Tobacco Use    Smoking status: Never Smoker    Smokeless tobacco: Never Used   Substance Use Topics    Alcohol use: Yes     Alcohol/week: 7 0 standard drinks     Types: 7 Glasses of wine per week    Drug use: No     E-Cigarette/Vaping     E-Cigarette/Vaping Substances       Family History: non-contributory    Meds/Allergies   Prior to Admission Medications   Prescriptions Last Dose Informant Patient Reported? Taking?    Mirabegron ER (MYRBETRIQ) 50 MG TB24   Yes No   Sig: Take 50 mg by mouth daily   Omega-3 Fatty Acids (FISH OIL) 1,000 mg   Yes No   Sig: Take 2,000 mg by mouth daily   Polyethylene Glycol 3350 (PEG 3350 PO)   Yes No   Sig: Take 17 g by mouth daily   acetaminophen (TYLENOL) 500 mg tablet   Yes No   Sig: TAKE 1 CAPLET ORALLY THREE TIMES DAILY FOR PAIN *NOT TO EXCEED 4GMS APAP/24HRS*   alendronate (FOSAMAX) 70 mg tablet   Yes No   Sig: Take 70 mg by mouth every 7 days   atorvastatin (LIPITOR) 20 mg tablet   Yes No   Sig: Take 20 mg by mouth daily   bumetanide (BUMEX) 2 mg tablet   No No   Sig: Take 1 tablet (2 mg total) by mouth daily   calcium carbonate (OS-DAVE) 600 MG tablet   Yes No   Sig: Take 600 mg by mouth 2 (two) times a day with meals   cholecalciferol (VITAMIN D3) 1,000 units tablet   Yes No   Sig: Take 5,000 Units by mouth daily   dextromethorphan-guaifenesin (MUCINEX DM)  MG per 12 hr tablet   Yes No   Sig: Take 1 tablet by mouth 2 (two) times a day as needed for cough   magnesium oxide (MAG-OX) 400 mg   No No   Sig: Take 1 tablet (400 mg total) by mouth 2 (two) times a day   metoprolol tartrate (LOPRESSOR) 25 mg tablet   Yes No   Sig: Take 25 mg by mouth 2 (two) times a day   mineral oil enema   Yes No   Sig: Insert 1 enema into the rectum as needed for constipation   oxybutynin (DITROPAN-XL) 10 MG 24 hr tablet   Yes No   Sig: Take 10 mg by mouth daily   potassium chloride (K-DUR,KLOR-CON) 20 mEq tablet   No No   Sig: Take 1 tablet by mouth daily   senna-docusate sodium (SENOKOT S) 8 6-50 mg per tablet   Yes No   Sig: Take 1 tablet by mouth daily      Facility-Administered Medications: None       No Known Allergies    PHYSICAL EXAM    PE limited by: none    Objective   Vitals:   First set: Temperature: 98 6 °F (37 °C) (08/07/20 1734)  Pulse: 63 (08/07/20 1710)  Respirations: 18 (08/07/20 1710)  Blood Pressure: 124/62 (08/07/20 1710)  SpO2: 93 % (08/07/20 1710)    Primary Survey:   (A) Airway: intact  (B) Breathing: bilateral breath sounds  (C) Circulation: Pulses:   normal  (D) Disabliity:  GCS Total:  14, Eye Opening:   Spontaneous = 4, Motor Response: Obeys commands = 6 and Verbal Response:  Confused = 4  (E) Expose:  Completed    Secondary Survey: (Click on Physical Exam tab above)  Physical Exam  Vitals signs and nursing note reviewed  Constitutional:       General: She is not in acute distress  Appearance: She is well-developed  She is not toxic-appearing or diaphoretic  HENT:      Head: Normocephalic and atraumatic  Eyes:      Conjunctiva/sclera: Conjunctivae normal       Pupils: Pupils are equal, round, and reactive to light  Neck:      Musculoskeletal: Normal range of motion and neck supple  Thyroid: No thyromegaly  Trachea: No tracheal deviation  Cardiovascular:      Rate and Rhythm: Normal rate and regular rhythm  Heart sounds: Normal heart sounds  Pulmonary:      Effort: Pulmonary effort is normal       Breath sounds: Normal breath sounds  Abdominal:      General: Bowel sounds are normal  There is no distension  Palpations: Abdomen is soft  Tenderness: There is no abdominal tenderness  Musculoskeletal:      Comments: Right shoulder abrasion with mild tenderness  Right forearm contusion  Full ROM right elbow and wrist with no bony tenderness  No midline C/T/L spine tenderness  Lymphadenopathy:      Cervical: No cervical adenopathy  Skin:     General: Skin is warm and dry  Neurological:      Mental Status: She is alert  Mental status is at baseline  Cervical spine cleared by clinical criteria?  No (imaging required)      Invasive Devices     None                 Lab Results:   Results Reviewed     Procedure Component Value Units Date/Time    Basic metabolic panel [[id number]] Collected:  08/07/20 1723    Lab Status:  Final result Specimen:  Blood from Arm, Left Updated:  08/07/20 1743     Sodium 140 mmol/L      Potassium 4 0 mmol/L      Chloride 101 mmol/L      CO2 32 mmol/L      ANION GAP 7 mmol/L      BUN 9 mg/dL      Creatinine 0 81 mg/dL      Glucose 100 mg/dL      Calcium 9 0 mg/dL      eGFR 64 ml/min/1 73sq m     Narrative:       [place] guidelines for Chronic Kidney Disease (CKD):     Stage 1 with normal or high GFR (GFR > 90 mL/min/1 73 square meters)    Stage 2 Mild CKD (GFR = 60-89 mL/min/1 73 square meters)    Stage 3A Moderate CKD (GFR = 45-59 mL/min/1 73 square meters)    Stage 3B Moderate CKD (GFR = 30-44 mL/min/1 73 square meters)    Stage 4 Severe CKD (GFR = 15-29 mL/min/1 73 square meters)    Stage 5 End Stage CKD (GFR <15 mL/min/1 73 square meters)  Note: GFR calculation is accurate only with a steady state creatinine    CBC and differential [[id number]] Collected:  08/07/20 1723    Lab Status:  Final result Specimen:  Blood from Arm, Left Updated:  08/07/20 1733     WBC 6 25 Thousand/uL      RBC 4 36 Million/uL      Hemoglobin 13 7 g/dL      Hematocrit 41 4 %      MCV 95 fL      MCH 31 4 pg      MCHC 33 1 g/dL      RDW 14 2 %      MPV 10 6 fL      Platelets 882 Thousands/uL      nRBC 0 /100 WBCs      Neutrophils Relative 67 %      Immat GRANS % 0 %      Lymphocytes Relative 21 %      Monocytes Relative 9 %      Eosinophils Relative 2 %      Basophils Relative 1 %      Neutrophils Absolute 4 13 Thousands/µL      Immature Grans Absolute 0 02 Thousand/uL      Lymphocytes Absolute 1 34 Thousands/µL      Monocytes Absolute 0 59 Thousand/µL      Eosinophils Absolute 0 12 Thousand/µL      Basophils Absolute 0 05 Thousands/µL                  Imaging Studies:   Direct to CT: Yes  XR shoulder 2+ views RIGHT   ED Interpretation by Ashley Chadwick MD (08/07 1758)   No acute fractures or dislocations      XR chest 1 view portable   ED Interpretation by Ashley Chadwick MD (08/07 1758)   Nodular density right middle lung field measuring approximately 2 cm  CT head without contrast   Final Result by Slade Mondragon MD (08/07 1800)      No acute intracranial abnormality  Microangiopathic changes  Workstation performed: [id number]         CT cervical spine without contrast   Final Result by Slade Mondragon MD (08/07 1805)      No acute fracture or dislocation                     Workstation performed: [id number] Procedures  ECG 12 Lead Documentation Only    Date/Time: 8/7/2020 5:16 PM  Performed by: Lou Choi MD  Authorized by: Lou Choi MD     Indications / Diagnosis:  Unwitnessed fall  ECG reviewed by me, the ED Provider: yes    Patient location:  ED  Previous ECG:     Previous ECG:  Compared to current    Comparison ECG info:  1/5/20 paced    Similarity:  No change  Interpretation:     Interpretation: abnormal    Quality:     Tracing quality:  Limited by artifact  Rate:     ECG rate:  58    ECG rate assessment: bradycardic    Rhythm:     Rhythm: paced    Pacing:     Capture:  Complete    Type of pacing:  Ventricular  Ectopy:     Ectopy: none    QRS:     QRS axis:  Left    QRS intervals: Wide  ST segments:     ST segments:  Normal  T waves:     T waves: normal               ED Course  ED Course as of Aug 07 2325   Fri Aug 07, 2020   1812 Patient's daughter, Elliott Lugo, updated by phone and informed of incidental pulmonary nodule seen on my interpretation of CXR and advised to have the patient follow up with her PCP  MDM  Number of Diagnoses or Management Options  Abrasion of right shoulder, initial encounter: new and requires workup  Fall, initial encounter: new and requires workup  Pulmonary nodule: minor  Skin tear of right forearm without complication, initial encounter: new and requires workup  Diagnosis management comments: 80year old female brought by EMS for evaluation after unwitnessed fall  CT head and C-spine negative for traumatic pathology  Xray right shoulder without fracture per my interpretation  Pulmonary nodule incidentally seen on CXR  PCP follow up         Amount and/or Complexity of Data Reviewed  Clinical lab tests: ordered and reviewed  Tests in the radiology section of CPT®: ordered and reviewed  Independent visualization of images, tracings, or specimens: yes    Patient Progress  Patient progress: stable          Disposition  [facility] Transfer: No  Final diagnoses:   Fall, initial encounter   Abrasion of right shoulder, initial encounter   Skin tear of right forearm without complication, initial encounter   Pulmonary nodule     Time reflects when diagnosis was documented in both MDM as applicable and the Disposition within this note     Time User Action Codes Description Comment    8/7/2020  5:47 PM Sandrea Pares Add [H99  OOJL] Fall, initial encounter     8/7/2020  5:47 PM Mike Glynn Add [S40 211A] Abrasion of right shoulder, initial encounter     8/7/2020  5:47 PM Sandrea Pares Add [F19 617L] Skin tear of right forearm without complication, initial encounter     8/7/2020  5:59 PM Sandrea Pares Add [R91 1] Pulmonary nodule       ED Disposition     ED Disposition Condition Date/Time Comment    Discharge Stable Fri Aug 7, 2020  6:13 PM Irving Garcia discharge to home/self care              Follow-up Information     Follow up With Specialties Details Why Contact Info Additional [address] [facility], [place] Nurse Practitioner Schedule an appointment as soon as possible for a visit in 3 days for re-evaluation after fall as well as incidental pulmonary nodule [address] Emergency Department Emergency Medicine Go to  If symptoms worsen, severe cough, fever, headaches [address] [place], [address]  [phone] [address] ED, [address] [facility]        Discharge Medication List as of 8/7/2020  6:17 PM      CONTINUE these medications which have NOT CHANGED    Details   acetaminophen (TYLENOL) 500 mg tablet TAKE 1 CAPLET ORALLY THREE TIMES DAILY FOR PAIN *NOT TO EXCEED 4GMS APAP/24HRS*, Historical Med      alendronate (FOSAMAX) 70 mg tablet Take 70 mg by mouth every 7 days, Historical Med      atorvastatin (LIPITOR) 20 mg tablet Take 20 mg by mouth daily, Historical Med      bumetanide (BUMEX) 2 mg tablet Take 1 tablet (2 mg total) by mouth daily, Starting Thu 1/9/2020, No Print      calcium carbonate (OS-DAVE) 600 MG tablet Take 600 mg by mouth 2 (two) times a day with meals, Historical Med      cholecalciferol (VITAMIN D3) 1,000 units tablet Take 5,000 Units by mouth daily, Historical Med      dextromethorphan-guaifenesin (MUCINEX DM)  MG per 12 hr tablet Take 1 tablet by mouth 2 (two) times a day as needed for cough, Historical Med      magnesium oxide (MAG-OX) 400 mg Take 1 tablet (400 mg total) by mouth 2 (two) times a day, Starting u 1/9/2020, No Print      metoprolol tartrate (LOPRESSOR) 25 mg tablet Take 25 mg by mouth 2 (two) times a day, Historical Med      mineral oil enema Insert 1 enema into the rectum as needed for constipation, Historical Med      Mirabegron ER (MYRBETRIQ) 50 MG TB24 Take 50 mg by mouth daily, Historical Med      Omega-3 Fatty Acids (FISH OIL) 1,000 mg Take 2,000 mg by mouth daily, Historical Med      oxybutynin (DITROPAN-XL) 10 MG 24 hr tablet Take 10 mg by mouth daily, Historical Med      Polyethylene Glycol 3350 (PEG 3350 PO) Take 17 g by mouth daily, Historical Med      potassium chloride (K-DUR,KLOR-CON) 20 mEq tablet Take 1 tablet by mouth daily, Starting Thu 11/2/2017, Print      senna-docusate sodium (SENOKOT S) 8 6-50 mg per tablet Take 1 tablet by mouth daily, Historical Med           No discharge procedures on file      PDMP Review     None          ED Provider  Electronically Signed by         Wolf Rea MD  08/07/20 0156

## 2020-08-07 NOTE — ED NOTES
Transportation set up with [facility] for [id number]     Lorena Rincon, [address] [facility]  08/07/20 3153

## 2020-08-08 LAB
ATRIAL RATE: 59 BPM
P AXIS: 73 DEGREES
PR INTERVAL: 102 MS
QRS AXIS: -80 DEGREES
QRSD INTERVAL: 144 MS
QT INTERVAL: 514 MS
QTC INTERVAL: 504 MS
T WAVE AXIS: 73 DEGREES
VENTRICULAR RATE: 58 BPM

## 2020-08-08 PROCEDURE — 93010 ELECTROCARDIOGRAM REPORT: CPT | Performed by: INTERNAL MEDICINE

## 2020-11-07 ENCOUNTER — HOSPITAL ENCOUNTER (INPATIENT)
Facility: HOSPITAL | Age: 85
LOS: 4 days | Discharge: HOME HEALTH | DRG: 872 | End: 2020-11-11
Attending: EMERGENCY MEDICINE | Admitting: INTERNAL MEDICINE
Payer: COMMERCIAL

## 2020-11-07 ENCOUNTER — APPOINTMENT (EMERGENCY)
Dept: RADIOLOGY | Facility: HOSPITAL | Age: 85
DRG: 872 | End: 2020-11-07
Payer: COMMERCIAL

## 2020-11-07 ENCOUNTER — APPOINTMENT (INPATIENT)
Dept: ULTRASOUND IMAGING | Facility: HOSPITAL | Age: 85
DRG: 872 | End: 2020-11-07
Payer: COMMERCIAL

## 2020-11-07 DIAGNOSIS — B96.20 E COLI BACTEREMIA: ICD-10-CM

## 2020-11-07 DIAGNOSIS — A41.9 SEPSIS WITHOUT ACUTE ORGAN DYSFUNCTION (HCC): ICD-10-CM

## 2020-11-07 DIAGNOSIS — R78.81 E COLI BACTEREMIA: ICD-10-CM

## 2020-11-07 DIAGNOSIS — I50.9 CONGESTIVE HEART DISEASE (HCC): Primary | ICD-10-CM

## 2020-11-07 DIAGNOSIS — R74.8 ELEVATED LIVER ENZYMES: ICD-10-CM

## 2020-11-07 DIAGNOSIS — R50.9 FEBRILE ILLNESS: ICD-10-CM

## 2020-11-07 PROBLEM — R65.10 SIRS DUE TO NON-INFECTIOUS PROCESS WITHOUT ACUTE ORGAN DYSFUNCTION (HCC): Status: ACTIVE | Noted: 2020-11-07

## 2020-11-07 PROBLEM — I50.32 CHRONIC DIASTOLIC CONGESTIVE HEART FAILURE (HCC): Status: ACTIVE | Noted: 2017-10-23

## 2020-11-07 PROBLEM — E88.09 HYPOALBUMINEMIA: Status: ACTIVE | Noted: 2020-11-07

## 2020-11-07 PROBLEM — D72.829 LEUCOCYTOSIS: Status: ACTIVE | Noted: 2020-11-07

## 2020-11-07 PROBLEM — R53.1 WEAKNESS GENERALIZED: Status: ACTIVE | Noted: 2020-11-07

## 2020-11-07 LAB
ALBUMIN SERPL BCP-MCNC: 2.6 G/DL (ref 3.5–5)
ALP SERPL-CCNC: 138 U/L (ref 46–116)
ALT SERPL W P-5'-P-CCNC: 17 U/L (ref 12–78)
ANION GAP SERPL CALCULATED.3IONS-SCNC: 9 MMOL/L (ref 4–13)
APTT PPP: 38 SECONDS (ref 23–37)
AST SERPL W P-5'-P-CCNC: 21 U/L (ref 5–45)
BASOPHILS # BLD AUTO: 0.04 THOUSANDS/ΜL (ref 0–0.1)
BASOPHILS NFR BLD AUTO: 0 % (ref 0–1)
BILIRUB SERPL-MCNC: 1.7 MG/DL (ref 0.2–1)
BILIRUB UR QL STRIP: NEGATIVE
BUN SERPL-MCNC: 11 MG/DL (ref 5–25)
CALCIUM ALBUM COR SERPL-MCNC: 9.8 MG/DL (ref 8.3–10.1)
CALCIUM SERPL-MCNC: 8.7 MG/DL (ref 8.3–10.1)
CHLORIDE SERPL-SCNC: 102 MMOL/L (ref 100–108)
CLARITY UR: NORMAL
CO2 SERPL-SCNC: 28 MMOL/L (ref 21–32)
COLOR UR: YELLOW
CREAT SERPL-MCNC: 0.71 MG/DL (ref 0.6–1.3)
CRP SERPL QL: 87.1 MG/L
EOSINOPHIL # BLD AUTO: 0.01 THOUSAND/ΜL (ref 0–0.61)
EOSINOPHIL NFR BLD AUTO: 0 % (ref 0–6)
ERYTHROCYTE [DISTWIDTH] IN BLOOD BY AUTOMATED COUNT: 15.1 % (ref 11.6–15.1)
GFR SERPL CREATININE-BSD FRML MDRD: 75 ML/MIN/1.73SQ M
GLUCOSE SERPL-MCNC: 116 MG/DL (ref 65–140)
GLUCOSE UR STRIP-MCNC: NEGATIVE MG/DL
HCT VFR BLD AUTO: 36.8 % (ref 34.8–46.1)
HGB BLD-MCNC: 12.1 G/DL (ref 11.5–15.4)
HGB UR QL STRIP.AUTO: NEGATIVE
IMM GRANULOCYTES # BLD AUTO: 0.04 THOUSAND/UL (ref 0–0.2)
IMM GRANULOCYTES NFR BLD AUTO: 0 % (ref 0–2)
INR PPP: 1.77 (ref 0.84–1.19)
KETONES UR STRIP-MCNC: NEGATIVE MG/DL
LACTATE SERPL-SCNC: 2 MMOL/L (ref 0.5–2)
LEUKOCYTE ESTERASE UR QL STRIP: NEGATIVE
LYMPHOCYTES # BLD AUTO: 0.65 THOUSANDS/ΜL (ref 0.6–4.47)
LYMPHOCYTES NFR BLD AUTO: 6 % (ref 14–44)
MCH RBC QN AUTO: 32.1 PG (ref 26.8–34.3)
MCHC RBC AUTO-ENTMCNC: 32.9 G/DL (ref 31.4–37.4)
MCV RBC AUTO: 98 FL (ref 82–98)
MONOCYTES # BLD AUTO: 0.83 THOUSAND/ΜL (ref 0.17–1.22)
MONOCYTES NFR BLD AUTO: 7 % (ref 4–12)
NEUTROPHILS # BLD AUTO: 10.09 THOUSANDS/ΜL (ref 1.85–7.62)
NEUTS SEG NFR BLD AUTO: 87 % (ref 43–75)
NITRITE UR QL STRIP: NEGATIVE
NRBC BLD AUTO-RTO: 0 /100 WBCS
NT-PROBNP SERPL-MCNC: 5613 PG/ML
PH UR STRIP.AUTO: 5 [PH]
PLATELET # BLD AUTO: 270 THOUSANDS/UL (ref 149–390)
PMV BLD AUTO: 10.4 FL (ref 8.9–12.7)
POTASSIUM SERPL-SCNC: 4 MMOL/L (ref 3.5–5.3)
PROCALCITONIN SERPL-MCNC: 0.07 NG/ML
PROT SERPL-MCNC: 6.1 G/DL (ref 6.4–8.2)
PROT UR STRIP-MCNC: NEGATIVE MG/DL
PROTHROMBIN TIME: 20.5 SECONDS (ref 11.6–14.5)
RBC # BLD AUTO: 3.77 MILLION/UL (ref 3.81–5.12)
SARS-COV-2 RNA RESP QL NAA+PROBE: NEGATIVE
SODIUM SERPL-SCNC: 139 MMOL/L (ref 136–145)
SP GR UR STRIP.AUTO: >=1.03 (ref 1–1.03)
TROPONIN I SERPL-MCNC: 0.02 NG/ML
UROBILINOGEN UR QL STRIP.AUTO: 0.2 E.U./DL
WBC # BLD AUTO: 11.66 THOUSAND/UL (ref 4.31–10.16)

## 2020-11-07 PROCEDURE — 99284 EMERGENCY DEPT VISIT MOD MDM: CPT | Performed by: EMERGENCY MEDICINE

## 2020-11-07 PROCEDURE — 83880 ASSAY OF NATRIURETIC PEPTIDE: CPT | Performed by: EMERGENCY MEDICINE

## 2020-11-07 PROCEDURE — 99285 EMERGENCY DEPT VISIT HI MDM: CPT

## 2020-11-07 PROCEDURE — 85610 PROTHROMBIN TIME: CPT | Performed by: EMERGENCY MEDICINE

## 2020-11-07 PROCEDURE — 76705 ECHO EXAM OF ABDOMEN: CPT

## 2020-11-07 PROCEDURE — 87040 BLOOD CULTURE FOR BACTERIA: CPT | Performed by: EMERGENCY MEDICINE

## 2020-11-07 PROCEDURE — 83605 ASSAY OF LACTIC ACID: CPT | Performed by: EMERGENCY MEDICINE

## 2020-11-07 PROCEDURE — 87186 SC STD MICRODIL/AGAR DIL: CPT | Performed by: EMERGENCY MEDICINE

## 2020-11-07 PROCEDURE — 71045 X-RAY EXAM CHEST 1 VIEW: CPT

## 2020-11-07 PROCEDURE — 85730 THROMBOPLASTIN TIME PARTIAL: CPT | Performed by: EMERGENCY MEDICINE

## 2020-11-07 PROCEDURE — 87077 CULTURE AEROBIC IDENTIFY: CPT | Performed by: EMERGENCY MEDICINE

## 2020-11-07 PROCEDURE — 84484 ASSAY OF TROPONIN QUANT: CPT | Performed by: EMERGENCY MEDICINE

## 2020-11-07 PROCEDURE — 80053 COMPREHEN METABOLIC PANEL: CPT | Performed by: EMERGENCY MEDICINE

## 2020-11-07 PROCEDURE — 81003 URINALYSIS AUTO W/O SCOPE: CPT | Performed by: EMERGENCY MEDICINE

## 2020-11-07 PROCEDURE — 85025 COMPLETE CBC W/AUTO DIFF WBC: CPT | Performed by: EMERGENCY MEDICINE

## 2020-11-07 PROCEDURE — 86140 C-REACTIVE PROTEIN: CPT | Performed by: EMERGENCY MEDICINE

## 2020-11-07 PROCEDURE — 87635 SARS-COV-2 COVID-19 AMP PRB: CPT | Performed by: EMERGENCY MEDICINE

## 2020-11-07 PROCEDURE — 93005 ELECTROCARDIOGRAM TRACING: CPT

## 2020-11-07 PROCEDURE — 36415 COLL VENOUS BLD VENIPUNCTURE: CPT | Performed by: EMERGENCY MEDICINE

## 2020-11-07 PROCEDURE — 99222 1ST HOSP IP/OBS MODERATE 55: CPT | Performed by: INTERNAL MEDICINE

## 2020-11-07 PROCEDURE — 84145 PROCALCITONIN (PCT): CPT | Performed by: EMERGENCY MEDICINE

## 2020-11-07 RX ORDER — ACETAMINOPHEN 325 MG/1
650 TABLET ORAL EVERY 6 HOURS PRN
Status: DISCONTINUED | OUTPATIENT
Start: 2020-11-07 | End: 2020-11-11 | Stop reason: HOSPADM

## 2020-11-07 RX ORDER — CEFEPIME HYDROCHLORIDE 1 G/50ML
1000 INJECTION, SOLUTION INTRAVENOUS ONCE
Status: COMPLETED | OUTPATIENT
Start: 2020-11-07 | End: 2020-11-07

## 2020-11-07 RX ORDER — AMOXICILLIN 250 MG
1 CAPSULE ORAL DAILY PRN
Status: DISCONTINUED | OUTPATIENT
Start: 2020-11-07 | End: 2020-11-11 | Stop reason: HOSPADM

## 2020-11-07 RX ORDER — HEPARIN SODIUM 5000 [USP'U]/ML
5000 INJECTION, SOLUTION INTRAVENOUS; SUBCUTANEOUS EVERY 8 HOURS SCHEDULED
Status: DISCONTINUED | OUTPATIENT
Start: 2020-11-07 | End: 2020-11-07

## 2020-11-07 RX ORDER — SODIUM CHLORIDE, SODIUM GLUCONATE, SODIUM ACETATE, POTASSIUM CHLORIDE, MAGNESIUM CHLORIDE, SODIUM PHOSPHATE, DIBASIC, AND POTASSIUM PHOSPHATE .53; .5; .37; .037; .03; .012; .00082 G/100ML; G/100ML; G/100ML; G/100ML; G/100ML; G/100ML; G/100ML
500 INJECTION, SOLUTION INTRAVENOUS ONCE
Status: COMPLETED | OUTPATIENT
Start: 2020-11-07 | End: 2020-11-07

## 2020-11-07 RX ORDER — AMOXICILLIN 250 MG
1 CAPSULE ORAL DAILY
Status: DISCONTINUED | OUTPATIENT
Start: 2020-11-07 | End: 2020-11-07

## 2020-11-07 RX ORDER — POTASSIUM CHLORIDE 1.5 G/1.77G
20 POWDER, FOR SOLUTION ORAL 2 TIMES DAILY
Status: ON HOLD | COMMUNITY
End: 2020-11-07

## 2020-11-07 RX ORDER — FUROSEMIDE 10 MG/ML
80 INJECTION INTRAMUSCULAR; INTRAVENOUS ONCE
Status: COMPLETED | OUTPATIENT
Start: 2020-11-07 | End: 2020-11-07

## 2020-11-07 RX ORDER — OXYBUTYNIN CHLORIDE 5 MG/1
10 TABLET, EXTENDED RELEASE ORAL DAILY
Status: DISCONTINUED | OUTPATIENT
Start: 2020-11-07 | End: 2020-11-07

## 2020-11-07 RX ORDER — ATORVASTATIN CALCIUM 20 MG/1
20 TABLET, FILM COATED ORAL DAILY
Status: DISCONTINUED | OUTPATIENT
Start: 2020-11-07 | End: 2020-11-07

## 2020-11-07 RX ORDER — POTASSIUM CHLORIDE 20 MEQ/1
20 TABLET, EXTENDED RELEASE ORAL DAILY
Status: DISCONTINUED | OUTPATIENT
Start: 2020-11-07 | End: 2020-11-11 | Stop reason: HOSPADM

## 2020-11-07 RX ORDER — FUROSEMIDE 10 MG/ML
40 INJECTION INTRAMUSCULAR; INTRAVENOUS 2 TIMES DAILY
Status: DISCONTINUED | OUTPATIENT
Start: 2020-11-07 | End: 2020-11-07

## 2020-11-07 RX ADMIN — ATORVASTATIN CALCIUM 20 MG: 20 TABLET, FILM COATED ORAL at 13:47

## 2020-11-07 RX ADMIN — ACETAMINOPHEN 650 MG: 325 TABLET ORAL at 13:47

## 2020-11-07 RX ADMIN — SENNOSIDES AND DOCUSATE SODIUM 1 TABLET: 8.6; 5 TABLET ORAL at 13:46

## 2020-11-07 RX ADMIN — CEFEPIME HYDROCHLORIDE 1000 MG: 1 INJECTION, SOLUTION INTRAVENOUS at 11:45

## 2020-11-07 RX ADMIN — POTASSIUM CHLORIDE 20 MEQ: 1500 TABLET, EXTENDED RELEASE ORAL at 13:47

## 2020-11-07 RX ADMIN — SODIUM CHLORIDE, SODIUM GLUCONATE, SODIUM ACETATE, POTASSIUM CHLORIDE, MAGNESIUM CHLORIDE, SODIUM PHOSPHATE, DIBASIC, AND POTASSIUM PHOSPHATE 500 ML: .53; .5; .37; .037; .03; .012; .00082 INJECTION, SOLUTION INTRAVENOUS at 15:36

## 2020-11-07 RX ADMIN — OXYBUTYNIN CHLORIDE 10 MG: 5 TABLET, EXTENDED RELEASE ORAL at 13:46

## 2020-11-07 RX ADMIN — FUROSEMIDE 80 MG: 10 INJECTION, SOLUTION INTRAMUSCULAR; INTRAVENOUS at 11:45

## 2020-11-07 RX ADMIN — APIXABAN 5 MG: 5 TABLET, FILM COATED ORAL at 18:31

## 2020-11-07 RX ADMIN — MAGNESIUM OXIDE TAB 400 MG (241.3 MG ELEMENTAL MG) 400 MG: 400 (241.3 MG) TAB at 18:31

## 2020-11-08 ENCOUNTER — APPOINTMENT (INPATIENT)
Dept: ULTRASOUND IMAGING | Facility: HOSPITAL | Age: 85
DRG: 872 | End: 2020-11-08
Payer: COMMERCIAL

## 2020-11-08 PROBLEM — A41.50 SEPSIS DUE TO GRAM-NEGATIVE BACTERIA (HCC): Status: ACTIVE | Noted: 2020-11-07

## 2020-11-08 PROBLEM — R78.81 GRAM-NEGATIVE BACTEREMIA: Status: ACTIVE | Noted: 2020-11-07

## 2020-11-08 LAB
ALBUMIN SERPL BCP-MCNC: 2.2 G/DL (ref 3.5–5)
ALP SERPL-CCNC: 119 U/L (ref 46–116)
ALT SERPL W P-5'-P-CCNC: 12 U/L (ref 12–78)
ANION GAP SERPL CALCULATED.3IONS-SCNC: 6 MMOL/L (ref 4–13)
ANION GAP SERPL CALCULATED.3IONS-SCNC: 8 MMOL/L (ref 4–13)
AST SERPL W P-5'-P-CCNC: 24 U/L (ref 5–45)
BILIRUB SERPL-MCNC: 1 MG/DL (ref 0.2–1)
BUN SERPL-MCNC: 13 MG/DL (ref 5–25)
BUN SERPL-MCNC: 14 MG/DL (ref 5–25)
CALCIUM ALBUM COR SERPL-MCNC: 9.6 MG/DL (ref 8.3–10.1)
CALCIUM SERPL-MCNC: 7.9 MG/DL (ref 8.3–10.1)
CALCIUM SERPL-MCNC: 8.2 MG/DL (ref 8.3–10.1)
CHLORIDE SERPL-SCNC: 103 MMOL/L (ref 100–108)
CHLORIDE SERPL-SCNC: 105 MMOL/L (ref 100–108)
CO2 SERPL-SCNC: 28 MMOL/L (ref 21–32)
CO2 SERPL-SCNC: 29 MMOL/L (ref 21–32)
CREAT SERPL-MCNC: 0.67 MG/DL (ref 0.6–1.3)
CREAT SERPL-MCNC: 0.72 MG/DL (ref 0.6–1.3)
ERYTHROCYTE [DISTWIDTH] IN BLOOD BY AUTOMATED COUNT: 15.2 % (ref 11.6–15.1)
GFR SERPL CREATININE-BSD FRML MDRD: 73 ML/MIN/1.73SQ M
GFR SERPL CREATININE-BSD FRML MDRD: 77 ML/MIN/1.73SQ M
GLUCOSE SERPL-MCNC: 100 MG/DL (ref 65–140)
GLUCOSE SERPL-MCNC: 139 MG/DL (ref 65–140)
HCT VFR BLD AUTO: 33.7 % (ref 34.8–46.1)
HGB BLD-MCNC: 11.1 G/DL (ref 11.5–15.4)
LACTATE SERPL-SCNC: 1.9 MMOL/L (ref 0.5–2)
MAGNESIUM SERPL-MCNC: 2 MG/DL (ref 1.6–2.6)
MCH RBC QN AUTO: 31.9 PG (ref 26.8–34.3)
MCHC RBC AUTO-ENTMCNC: 32.9 G/DL (ref 31.4–37.4)
MCV RBC AUTO: 97 FL (ref 82–98)
PLATELET # BLD AUTO: 236 THOUSANDS/UL (ref 149–390)
PMV BLD AUTO: 10.2 FL (ref 8.9–12.7)
POTASSIUM SERPL-SCNC: 3.2 MMOL/L (ref 3.5–5.3)
POTASSIUM SERPL-SCNC: 3.5 MMOL/L (ref 3.5–5.3)
PROCALCITONIN SERPL-MCNC: 0.31 NG/ML
PROT SERPL-MCNC: 5.4 G/DL (ref 6.4–8.2)
RBC # BLD AUTO: 3.48 MILLION/UL (ref 3.81–5.12)
SODIUM SERPL-SCNC: 138 MMOL/L (ref 136–145)
SODIUM SERPL-SCNC: 141 MMOL/L (ref 136–145)
WBC # BLD AUTO: 9.39 THOUSAND/UL (ref 4.31–10.16)

## 2020-11-08 PROCEDURE — 80048 BASIC METABOLIC PNL TOTAL CA: CPT | Performed by: INTERNAL MEDICINE

## 2020-11-08 PROCEDURE — 85027 COMPLETE CBC AUTOMATED: CPT | Performed by: INTERNAL MEDICINE

## 2020-11-08 PROCEDURE — 83605 ASSAY OF LACTIC ACID: CPT | Performed by: INTERNAL MEDICINE

## 2020-11-08 PROCEDURE — 99232 SBSQ HOSP IP/OBS MODERATE 35: CPT | Performed by: INTERNAL MEDICINE

## 2020-11-08 PROCEDURE — 80053 COMPREHEN METABOLIC PANEL: CPT | Performed by: INTERNAL MEDICINE

## 2020-11-08 PROCEDURE — 84145 PROCALCITONIN (PCT): CPT | Performed by: INTERNAL MEDICINE

## 2020-11-08 PROCEDURE — 87040 BLOOD CULTURE FOR BACTERIA: CPT | Performed by: PHYSICIAN ASSISTANT

## 2020-11-08 PROCEDURE — 76770 US EXAM ABDO BACK WALL COMP: CPT

## 2020-11-08 PROCEDURE — 83735 ASSAY OF MAGNESIUM: CPT | Performed by: INTERNAL MEDICINE

## 2020-11-08 RX ORDER — SODIUM CHLORIDE, SODIUM GLUCONATE, SODIUM ACETATE, POTASSIUM CHLORIDE, MAGNESIUM CHLORIDE, SODIUM PHOSPHATE, DIBASIC, AND POTASSIUM PHOSPHATE .53; .5; .37; .037; .03; .012; .00082 G/100ML; G/100ML; G/100ML; G/100ML; G/100ML; G/100ML; G/100ML
75 INJECTION, SOLUTION INTRAVENOUS CONTINUOUS
Status: DISCONTINUED | OUTPATIENT
Start: 2020-11-08 | End: 2020-11-08

## 2020-11-08 RX ORDER — SODIUM CHLORIDE, SODIUM GLUCONATE, SODIUM ACETATE, POTASSIUM CHLORIDE, MAGNESIUM CHLORIDE, SODIUM PHOSPHATE, DIBASIC, AND POTASSIUM PHOSPHATE .53; .5; .37; .037; .03; .012; .00082 G/100ML; G/100ML; G/100ML; G/100ML; G/100ML; G/100ML; G/100ML
75 INJECTION, SOLUTION INTRAVENOUS CONTINUOUS
Status: DISPENSED | OUTPATIENT
Start: 2020-11-08 | End: 2020-11-09

## 2020-11-08 RX ORDER — CEFEPIME HYDROCHLORIDE 2 G/50ML
2000 INJECTION, SOLUTION INTRAVENOUS EVERY 12 HOURS
Status: DISCONTINUED | OUTPATIENT
Start: 2020-11-08 | End: 2020-11-11

## 2020-11-08 RX ADMIN — APIXABAN 5 MG: 5 TABLET, FILM COATED ORAL at 09:52

## 2020-11-08 RX ADMIN — ACETAMINOPHEN 650 MG: 325 TABLET ORAL at 09:52

## 2020-11-08 RX ADMIN — CEFEPIME HYDROCHLORIDE 2000 MG: 2 INJECTION, SOLUTION INTRAVENOUS at 15:46

## 2020-11-08 RX ADMIN — MAGNESIUM OXIDE TAB 400 MG (241.3 MG ELEMENTAL MG) 400 MG: 400 (241.3 MG) TAB at 17:09

## 2020-11-08 RX ADMIN — CEFEPIME HYDROCHLORIDE 2000 MG: 2 INJECTION, SOLUTION INTRAVENOUS at 04:02

## 2020-11-08 RX ADMIN — SODIUM CHLORIDE, SODIUM GLUCONATE, SODIUM ACETATE, POTASSIUM CHLORIDE, MAGNESIUM CHLORIDE, SODIUM PHOSPHATE, DIBASIC, AND POTASSIUM PHOSPHATE 75 ML/HR: .53; .5; .37; .037; .03; .012; .00082 INJECTION, SOLUTION INTRAVENOUS at 10:04

## 2020-11-08 RX ADMIN — SENNOSIDES AND DOCUSATE SODIUM 1 TABLET: 8.6; 5 TABLET ORAL at 09:51

## 2020-11-08 RX ADMIN — MAGNESIUM OXIDE TAB 400 MG (241.3 MG ELEMENTAL MG) 400 MG: 400 (241.3 MG) TAB at 09:51

## 2020-11-08 RX ADMIN — APIXABAN 5 MG: 5 TABLET, FILM COATED ORAL at 17:09

## 2020-11-08 RX ADMIN — SODIUM CHLORIDE, SODIUM GLUCONATE, SODIUM ACETATE, POTASSIUM CHLORIDE, MAGNESIUM CHLORIDE, SODIUM PHOSPHATE, DIBASIC, AND POTASSIUM PHOSPHATE 75 ML/HR: .53; .5; .37; .037; .03; .012; .00082 INJECTION, SOLUTION INTRAVENOUS at 13:46

## 2020-11-08 RX ADMIN — POTASSIUM CHLORIDE 20 MEQ: 1500 TABLET, EXTENDED RELEASE ORAL at 09:51

## 2020-11-09 PROBLEM — A41.9 SEPSIS WITHOUT ACUTE ORGAN DYSFUNCTION (HCC): Status: ACTIVE | Noted: 2020-11-09

## 2020-11-09 LAB
ANION GAP SERPL CALCULATED.3IONS-SCNC: 8 MMOL/L (ref 4–13)
BUN SERPL-MCNC: 12 MG/DL (ref 5–25)
CALCIUM SERPL-MCNC: 7.7 MG/DL (ref 8.3–10.1)
CHLORIDE SERPL-SCNC: 104 MMOL/L (ref 100–108)
CO2 SERPL-SCNC: 26 MMOL/L (ref 21–32)
CREAT SERPL-MCNC: 0.61 MG/DL (ref 0.6–1.3)
ERYTHROCYTE [DISTWIDTH] IN BLOOD BY AUTOMATED COUNT: 15.1 % (ref 11.6–15.1)
GFR SERPL CREATININE-BSD FRML MDRD: 80 ML/MIN/1.73SQ M
GLUCOSE SERPL-MCNC: 95 MG/DL (ref 65–140)
HCT VFR BLD AUTO: 35.1 % (ref 34.8–46.1)
HGB BLD-MCNC: 11.3 G/DL (ref 11.5–15.4)
MCH RBC QN AUTO: 31.3 PG (ref 26.8–34.3)
MCHC RBC AUTO-ENTMCNC: 32.2 G/DL (ref 31.4–37.4)
MCV RBC AUTO: 97 FL (ref 82–98)
PLATELET # BLD AUTO: 244 THOUSANDS/UL (ref 149–390)
PMV BLD AUTO: 10.8 FL (ref 8.9–12.7)
POTASSIUM SERPL-SCNC: 3.8 MMOL/L (ref 3.5–5.3)
RBC # BLD AUTO: 3.61 MILLION/UL (ref 3.81–5.12)
SODIUM SERPL-SCNC: 138 MMOL/L (ref 136–145)
WBC # BLD AUTO: 7.66 THOUSAND/UL (ref 4.31–10.16)

## 2020-11-09 PROCEDURE — 99232 SBSQ HOSP IP/OBS MODERATE 35: CPT | Performed by: INTERNAL MEDICINE

## 2020-11-09 PROCEDURE — 80048 BASIC METABOLIC PNL TOTAL CA: CPT | Performed by: INTERNAL MEDICINE

## 2020-11-09 PROCEDURE — 85027 COMPLETE CBC AUTOMATED: CPT | Performed by: INTERNAL MEDICINE

## 2020-11-09 RX ADMIN — MAGNESIUM OXIDE TAB 400 MG (241.3 MG ELEMENTAL MG) 400 MG: 400 (241.3 MG) TAB at 17:28

## 2020-11-09 RX ADMIN — POTASSIUM CHLORIDE 20 MEQ: 1500 TABLET, EXTENDED RELEASE ORAL at 08:51

## 2020-11-09 RX ADMIN — APIXABAN 5 MG: 5 TABLET, FILM COATED ORAL at 08:51

## 2020-11-09 RX ADMIN — CEFEPIME HYDROCHLORIDE 2000 MG: 2 INJECTION, SOLUTION INTRAVENOUS at 03:23

## 2020-11-09 RX ADMIN — APIXABAN 5 MG: 5 TABLET, FILM COATED ORAL at 17:28

## 2020-11-09 RX ADMIN — SODIUM CHLORIDE, SODIUM GLUCONATE, SODIUM ACETATE, POTASSIUM CHLORIDE, MAGNESIUM CHLORIDE, SODIUM PHOSPHATE, DIBASIC, AND POTASSIUM PHOSPHATE 75 ML/HR: .53; .5; .37; .037; .03; .012; .00082 INJECTION, SOLUTION INTRAVENOUS at 03:23

## 2020-11-09 RX ADMIN — CEFEPIME HYDROCHLORIDE 2000 MG: 2 INJECTION, SOLUTION INTRAVENOUS at 15:43

## 2020-11-09 RX ADMIN — MAGNESIUM OXIDE TAB 400 MG (241.3 MG ELEMENTAL MG) 400 MG: 400 (241.3 MG) TAB at 08:50

## 2020-11-10 PROBLEM — B96.20 E COLI BACTEREMIA: Status: ACTIVE | Noted: 2020-11-07

## 2020-11-10 LAB
ALBUMIN SERPL BCP-MCNC: 2.1 G/DL (ref 3.5–5)
ALP SERPL-CCNC: 114 U/L (ref 46–116)
ALT SERPL W P-5'-P-CCNC: 23 U/L (ref 12–78)
ANION GAP SERPL CALCULATED.3IONS-SCNC: 5 MMOL/L (ref 4–13)
AST SERPL W P-5'-P-CCNC: 36 U/L (ref 5–45)
BILIRUB DIRECT SERPL-MCNC: 0.17 MG/DL (ref 0–0.2)
BILIRUB SERPL-MCNC: 0.3 MG/DL (ref 0.2–1)
BUN SERPL-MCNC: 12 MG/DL (ref 5–25)
CALCIUM SERPL-MCNC: 7.9 MG/DL (ref 8.3–10.1)
CHLORIDE SERPL-SCNC: 105 MMOL/L (ref 100–108)
CO2 SERPL-SCNC: 27 MMOL/L (ref 21–32)
CREAT SERPL-MCNC: 0.62 MG/DL (ref 0.6–1.3)
ERYTHROCYTE [DISTWIDTH] IN BLOOD BY AUTOMATED COUNT: 15.1 % (ref 11.6–15.1)
GFR SERPL CREATININE-BSD FRML MDRD: 79 ML/MIN/1.73SQ M
GLUCOSE SERPL-MCNC: 112 MG/DL (ref 65–140)
HCT VFR BLD AUTO: 34 % (ref 34.8–46.1)
HGB BLD-MCNC: 11.4 G/DL (ref 11.5–15.4)
MCH RBC QN AUTO: 31.8 PG (ref 26.8–34.3)
MCHC RBC AUTO-ENTMCNC: 33.5 G/DL (ref 31.4–37.4)
MCV RBC AUTO: 95 FL (ref 82–98)
PLATELET # BLD AUTO: 263 THOUSANDS/UL (ref 149–390)
PMV BLD AUTO: 10.9 FL (ref 8.9–12.7)
POTASSIUM SERPL-SCNC: 4.1 MMOL/L (ref 3.5–5.3)
PROCALCITONIN SERPL-MCNC: 0.13 NG/ML
PROT SERPL-MCNC: 5.5 G/DL (ref 6.4–8.2)
RBC # BLD AUTO: 3.58 MILLION/UL (ref 3.81–5.12)
SODIUM SERPL-SCNC: 137 MMOL/L (ref 136–145)
WBC # BLD AUTO: 6.99 THOUSAND/UL (ref 4.31–10.16)

## 2020-11-10 PROCEDURE — 80076 HEPATIC FUNCTION PANEL: CPT | Performed by: INTERNAL MEDICINE

## 2020-11-10 PROCEDURE — 97166 OT EVAL MOD COMPLEX 45 MIN: CPT

## 2020-11-10 PROCEDURE — 90662 IIV NO PRSV INCREASED AG IM: CPT | Performed by: INTERNAL MEDICINE

## 2020-11-10 PROCEDURE — G0008 ADMIN INFLUENZA VIRUS VAC: HCPCS | Performed by: INTERNAL MEDICINE

## 2020-11-10 PROCEDURE — 99232 SBSQ HOSP IP/OBS MODERATE 35: CPT | Performed by: PHYSICIAN ASSISTANT

## 2020-11-10 PROCEDURE — 97163 PT EVAL HIGH COMPLEX 45 MIN: CPT

## 2020-11-10 PROCEDURE — 85027 COMPLETE CBC AUTOMATED: CPT | Performed by: INTERNAL MEDICINE

## 2020-11-10 PROCEDURE — 80048 BASIC METABOLIC PNL TOTAL CA: CPT | Performed by: INTERNAL MEDICINE

## 2020-11-10 PROCEDURE — 84145 PROCALCITONIN (PCT): CPT | Performed by: INTERNAL MEDICINE

## 2020-11-10 RX ADMIN — MAGNESIUM OXIDE TAB 400 MG (241.3 MG ELEMENTAL MG) 400 MG: 400 (241.3 MG) TAB at 17:14

## 2020-11-10 RX ADMIN — MAGNESIUM OXIDE TAB 400 MG (241.3 MG ELEMENTAL MG) 400 MG: 400 (241.3 MG) TAB at 09:20

## 2020-11-10 RX ADMIN — APIXABAN 5 MG: 5 TABLET, FILM COATED ORAL at 17:14

## 2020-11-10 RX ADMIN — CEFEPIME HYDROCHLORIDE 2000 MG: 2 INJECTION, SOLUTION INTRAVENOUS at 17:21

## 2020-11-10 RX ADMIN — INFLUENZA A VIRUS A/MICHIGAN/45/2015 X-275 (H1N1) ANTIGEN (FORMALDEHYDE INACTIVATED), INFLUENZA A VIRUS A/SINGAPORE/INFIMH-16-0019/2016 IVR-186 (H3N2) ANTIGEN (FORMALDEHYDE INACTIVATED), INFLUENZA B VIRUS B/PHUKET/3073/2013 ANTIGEN (FORMALDEHYDE INACTIVATED), AND INFLUENZA B VIRUS B/MARYLAND/15/2016 BX-69A ANTIGEN (FORMALDEHYDE INACTIVATED) 0.7 ML: 60; 60; 60; 60 INJECTION, SUSPENSION INTRAMUSCULAR at 09:20

## 2020-11-10 RX ADMIN — POTASSIUM CHLORIDE 20 MEQ: 1500 TABLET, EXTENDED RELEASE ORAL at 09:20

## 2020-11-10 RX ADMIN — CEFEPIME HYDROCHLORIDE 2000 MG: 2 INJECTION, SOLUTION INTRAVENOUS at 04:42

## 2020-11-10 RX ADMIN — APIXABAN 5 MG: 5 TABLET, FILM COATED ORAL at 09:20

## 2020-11-11 VITALS
HEART RATE: 75 BPM | WEIGHT: 171.08 LBS | BODY MASS INDEX: 29.21 KG/M2 | SYSTOLIC BLOOD PRESSURE: 104 MMHG | RESPIRATION RATE: 17 BRPM | DIASTOLIC BLOOD PRESSURE: 59 MMHG | OXYGEN SATURATION: 96 % | TEMPERATURE: 98.8 F | HEIGHT: 64 IN

## 2020-11-11 PROBLEM — R74.8 ELEVATED LIVER ENZYMES: Status: RESOLVED | Noted: 2020-11-07 | Resolved: 2020-11-11

## 2020-11-11 LAB
ALBUMIN SERPL BCP-MCNC: 2.1 G/DL (ref 3.5–5)
ALP SERPL-CCNC: 111 U/L (ref 46–116)
ALT SERPL W P-5'-P-CCNC: 30 U/L (ref 12–78)
ANION GAP SERPL CALCULATED.3IONS-SCNC: 5 MMOL/L (ref 4–13)
AST SERPL W P-5'-P-CCNC: 46 U/L (ref 5–45)
ATRIAL RATE: 141 BPM
BASOPHILS # BLD AUTO: 0.03 THOUSANDS/ΜL (ref 0–0.1)
BASOPHILS NFR BLD AUTO: 0 % (ref 0–1)
BILIRUB SERPL-MCNC: 0.6 MG/DL (ref 0.2–1)
BUN SERPL-MCNC: 13 MG/DL (ref 5–25)
CALCIUM ALBUM COR SERPL-MCNC: 9.7 MG/DL (ref 8.3–10.1)
CALCIUM SERPL-MCNC: 8.2 MG/DL (ref 8.3–10.1)
CHLORIDE SERPL-SCNC: 105 MMOL/L (ref 100–108)
CO2 SERPL-SCNC: 28 MMOL/L (ref 21–32)
CREAT SERPL-MCNC: 0.62 MG/DL (ref 0.6–1.3)
EOSINOPHIL # BLD AUTO: 0.16 THOUSAND/ΜL (ref 0–0.61)
EOSINOPHIL NFR BLD AUTO: 2 % (ref 0–6)
ERYTHROCYTE [DISTWIDTH] IN BLOOD BY AUTOMATED COUNT: 14.9 % (ref 11.6–15.1)
FLUAV RNA RESP QL NAA+PROBE: NEGATIVE
FLUBV RNA RESP QL NAA+PROBE: NEGATIVE
GFR SERPL CREATININE-BSD FRML MDRD: 79 ML/MIN/1.73SQ M
GLUCOSE SERPL-MCNC: 114 MG/DL (ref 65–140)
HCT VFR BLD AUTO: 34.9 % (ref 34.8–46.1)
HGB BLD-MCNC: 11.3 G/DL (ref 11.5–15.4)
IMM GRANULOCYTES # BLD AUTO: 0.02 THOUSAND/UL (ref 0–0.2)
IMM GRANULOCYTES NFR BLD AUTO: 0 % (ref 0–2)
LYMPHOCYTES # BLD AUTO: 1.14 THOUSANDS/ΜL (ref 0.6–4.47)
LYMPHOCYTES NFR BLD AUTO: 16 % (ref 14–44)
MCH RBC QN AUTO: 31.6 PG (ref 26.8–34.3)
MCHC RBC AUTO-ENTMCNC: 32.4 G/DL (ref 31.4–37.4)
MCV RBC AUTO: 98 FL (ref 82–98)
MONOCYTES # BLD AUTO: 0.85 THOUSAND/ΜL (ref 0.17–1.22)
MONOCYTES NFR BLD AUTO: 12 % (ref 4–12)
NEUTROPHILS # BLD AUTO: 4.93 THOUSANDS/ΜL (ref 1.85–7.62)
NEUTS SEG NFR BLD AUTO: 70 % (ref 43–75)
NRBC BLD AUTO-RTO: 0 /100 WBCS
PLATELET # BLD AUTO: 264 THOUSANDS/UL (ref 149–390)
PMV BLD AUTO: 10.7 FL (ref 8.9–12.7)
POTASSIUM SERPL-SCNC: 4.4 MMOL/L (ref 3.5–5.3)
PROCALCITONIN SERPL-MCNC: 0.08 NG/ML
PROT SERPL-MCNC: 5.6 G/DL (ref 6.4–8.2)
QRS AXIS: -59 DEGREES
QRSD INTERVAL: 90 MS
QT INTERVAL: 500 MS
QTC INTERVAL: 515 MS
RBC # BLD AUTO: 3.58 MILLION/UL (ref 3.81–5.12)
RSV RNA RESP QL NAA+PROBE: NEGATIVE
SARS-COV-2 RNA RESP QL NAA+PROBE: NEGATIVE
SODIUM SERPL-SCNC: 138 MMOL/L (ref 136–145)
T WAVE AXIS: 163 DEGREES
VENTRICULAR RATE: 64 BPM
WBC # BLD AUTO: 7.13 THOUSAND/UL (ref 4.31–10.16)

## 2020-11-11 PROCEDURE — 0241U HB NFCT DS VIR RESP RNA 4 TRGT: CPT | Performed by: PHYSICIAN ASSISTANT

## 2020-11-11 PROCEDURE — 80053 COMPREHEN METABOLIC PANEL: CPT | Performed by: PHYSICIAN ASSISTANT

## 2020-11-11 PROCEDURE — 99239 HOSP IP/OBS DSCHRG MGMT >30: CPT | Performed by: PHYSICIAN ASSISTANT

## 2020-11-11 PROCEDURE — 85025 COMPLETE CBC W/AUTO DIFF WBC: CPT | Performed by: PHYSICIAN ASSISTANT

## 2020-11-11 PROCEDURE — 93010 ELECTROCARDIOGRAM REPORT: CPT | Performed by: INTERNAL MEDICINE

## 2020-11-11 PROCEDURE — 84145 PROCALCITONIN (PCT): CPT | Performed by: INTERNAL MEDICINE

## 2020-11-11 RX ORDER — CEPHALEXIN 250 MG/1
500 CAPSULE ORAL EVERY 8 HOURS SCHEDULED
Status: DISCONTINUED | OUTPATIENT
Start: 2020-11-11 | End: 2020-11-11 | Stop reason: HOSPADM

## 2020-11-11 RX ORDER — CEPHALEXIN 500 MG/1
500 CAPSULE ORAL EVERY 8 HOURS SCHEDULED
Qty: 30 CAPSULE | Refills: 0
Start: 2020-11-11 | End: 2020-11-11

## 2020-11-11 RX ORDER — CEPHALEXIN 500 MG/1
500 CAPSULE ORAL EVERY 8 HOURS SCHEDULED
Qty: 30 CAPSULE | Refills: 0 | Status: SHIPPED | OUTPATIENT
Start: 2020-11-11 | End: 2020-11-21

## 2020-11-11 RX ADMIN — CEPHALEXIN 500 MG: 250 CAPSULE ORAL at 13:03

## 2020-11-11 RX ADMIN — MAGNESIUM OXIDE TAB 400 MG (241.3 MG ELEMENTAL MG) 400 MG: 400 (241.3 MG) TAB at 08:56

## 2020-11-11 RX ADMIN — POTASSIUM CHLORIDE 20 MEQ: 1500 TABLET, EXTENDED RELEASE ORAL at 08:56

## 2020-11-11 RX ADMIN — APIXABAN 5 MG: 5 TABLET, FILM COATED ORAL at 08:56

## 2020-11-11 RX ADMIN — CEFEPIME HYDROCHLORIDE 2000 MG: 2 INJECTION, SOLUTION INTRAVENOUS at 04:18

## 2020-11-12 LAB
BACTERIA BLD CULT: ABNORMAL
BACTERIA BLD CULT: ABNORMAL
GRAM STN SPEC: ABNORMAL
GRAM STN SPEC: ABNORMAL

## 2020-11-13 LAB
BACTERIA BLD CULT: NORMAL
BACTERIA BLD CULT: NORMAL

## 2020-11-26 ENCOUNTER — HOSPITAL ENCOUNTER (EMERGENCY)
Facility: HOSPITAL | Age: 85
Discharge: HOME | End: 2020-11-26
Attending: EMERGENCY MEDICINE
Payer: COMMERCIAL

## 2020-11-26 VITALS
BODY MASS INDEX: 29.02 KG/M2 | WEIGHT: 170 LBS | OXYGEN SATURATION: 96 % | RESPIRATION RATE: 18 BRPM | TEMPERATURE: 97.5 F | HEART RATE: 67 BPM | HEIGHT: 64 IN | DIASTOLIC BLOOD PRESSURE: 68 MMHG | SYSTOLIC BLOOD PRESSURE: 127 MMHG

## 2020-11-26 DIAGNOSIS — Z51.89 VISIT FOR WOUND CHECK: Primary | ICD-10-CM

## 2020-11-26 DIAGNOSIS — L89.209: ICD-10-CM

## 2020-11-26 LAB
ALBUMIN SERPL BCP-MCNC: 2.5 G/DL (ref 3.5–5)
ALP SERPL-CCNC: 133 U/L (ref 46–116)
ALT SERPL W P-5'-P-CCNC: 38 U/L (ref 12–78)
ANION GAP SERPL CALCULATED.3IONS-SCNC: 7 MMOL/L (ref 4–13)
APTT PPP: 35 SECONDS (ref 23–37)
AST SERPL W P-5'-P-CCNC: 44 U/L (ref 5–45)
BASOPHILS # BLD AUTO: 0.05 THOUSANDS/ΜL (ref 0–0.1)
BASOPHILS NFR BLD AUTO: 1 % (ref 0–1)
BILIRUB SERPL-MCNC: 1.1 MG/DL (ref 0.2–1)
BUN SERPL-MCNC: 11 MG/DL (ref 5–25)
CALCIUM ALBUM COR SERPL-MCNC: 10.2 MG/DL (ref 8.3–10.1)
CALCIUM SERPL-MCNC: 9 MG/DL (ref 8.3–10.1)
CHLORIDE SERPL-SCNC: 105 MMOL/L (ref 100–108)
CO2 SERPL-SCNC: 30 MMOL/L (ref 21–32)
CREAT SERPL-MCNC: 0.62 MG/DL (ref 0.6–1.3)
EOSINOPHIL # BLD AUTO: 0.14 THOUSAND/ΜL (ref 0–0.61)
EOSINOPHIL NFR BLD AUTO: 2 % (ref 0–6)
ERYTHROCYTE [DISTWIDTH] IN BLOOD BY AUTOMATED COUNT: 14.6 % (ref 11.6–15.1)
GFR SERPL CREATININE-BSD FRML MDRD: 79 ML/MIN/1.73SQ M
GLUCOSE SERPL-MCNC: 88 MG/DL (ref 65–140)
HCT VFR BLD AUTO: 45.4 % (ref 34.8–46.1)
HGB BLD-MCNC: 14.4 G/DL (ref 11.5–15.4)
IMM GRANULOCYTES # BLD AUTO: 0.03 THOUSAND/UL (ref 0–0.2)
IMM GRANULOCYTES NFR BLD AUTO: 0 % (ref 0–2)
INR PPP: 1.25 (ref 0.84–1.19)
LYMPHOCYTES # BLD AUTO: 1.54 THOUSANDS/ΜL (ref 0.6–4.47)
LYMPHOCYTES NFR BLD AUTO: 18 % (ref 14–44)
MCH RBC QN AUTO: 31.2 PG (ref 26.8–34.3)
MCHC RBC AUTO-ENTMCNC: 31.7 G/DL (ref 31.4–37.4)
MCV RBC AUTO: 98 FL (ref 82–98)
MONOCYTES # BLD AUTO: 0.69 THOUSAND/ΜL (ref 0.17–1.22)
MONOCYTES NFR BLD AUTO: 8 % (ref 4–12)
NEUTROPHILS # BLD AUTO: 6.33 THOUSANDS/ΜL (ref 1.85–7.62)
NEUTS SEG NFR BLD AUTO: 71 % (ref 43–75)
NRBC BLD AUTO-RTO: 0 /100 WBCS
PLATELET # BLD AUTO: 518 THOUSANDS/UL (ref 149–390)
PMV BLD AUTO: 10.1 FL (ref 8.9–12.7)
POTASSIUM SERPL-SCNC: 4.7 MMOL/L (ref 3.5–5.3)
PROT SERPL-MCNC: 7 G/DL (ref 6.4–8.2)
PROTHROMBIN TIME: 15.7 SECONDS (ref 11.6–14.5)
RBC # BLD AUTO: 4.62 MILLION/UL (ref 3.81–5.12)
SODIUM SERPL-SCNC: 142 MMOL/L (ref 136–145)
WBC # BLD AUTO: 8.78 THOUSAND/UL (ref 4.31–10.16)

## 2020-11-26 PROCEDURE — 36415 COLL VENOUS BLD VENIPUNCTURE: CPT | Performed by: PHYSICIAN ASSISTANT

## 2020-11-26 PROCEDURE — 99283 EMERGENCY DEPT VISIT LOW MDM: CPT | Performed by: PHYSICIAN ASSISTANT

## 2020-11-26 PROCEDURE — 80053 COMPREHEN METABOLIC PANEL: CPT | Performed by: PHYSICIAN ASSISTANT

## 2020-11-26 PROCEDURE — 99284 EMERGENCY DEPT VISIT MOD MDM: CPT

## 2020-11-26 PROCEDURE — 85730 THROMBOPLASTIN TIME PARTIAL: CPT | Performed by: PHYSICIAN ASSISTANT

## 2020-11-26 PROCEDURE — 85025 COMPLETE CBC W/AUTO DIFF WBC: CPT | Performed by: PHYSICIAN ASSISTANT

## 2020-11-26 PROCEDURE — 85610 PROTHROMBIN TIME: CPT | Performed by: PHYSICIAN ASSISTANT

## 2020-11-26 RX ORDER — CEPHALEXIN 250 MG/1
500 CAPSULE ORAL ONCE
Status: COMPLETED | OUTPATIENT
Start: 2020-11-26 | End: 2020-11-26

## 2020-11-26 RX ORDER — CEPHALEXIN 500 MG/1
500 CAPSULE ORAL EVERY 6 HOURS SCHEDULED
Qty: 28 CAPSULE | Refills: 0 | Status: SHIPPED | OUTPATIENT
Start: 2020-11-26 | End: 2020-12-03

## 2020-11-26 RX ADMIN — CEPHALEXIN 500 MG: 250 CAPSULE ORAL at 15:41

## 2023-06-12 NOTE — PROGRESS NOTES
[facility] Progress Note  Patient: Erasto Briggs 80 y o  female   MRN: [id number]  PCP: [place] Choudhary  Unit/Bed#: E2 -01 Encounter: [id number]  Date Of Visit: 10/24/17    Assessment:    Principal Problem:    CHF (congestive heart failure) ([facility] )  Active Problems:    Hyperlipidemia    Coronary artery disease    A-fib ([facility] )    Knee contusion      Plan:    · CHF from presumed chronic diastolic heart failure still in need of further diuresis and really did not respond appreciably to 40 mg b i d  dosing of IV Lasix will increase to 80 mg b i d  today continue to monitor weight dry weight may be as low as 199 presently 213  · Hypertension with moderate left ventricular hypertrophy on recent echocardiogram stable without meds  · Nonobstructive coronary artery disease continue statin  · Chronic atrial fibrillation but given fall history and wide amount of ecchymosis and hematoma in her left leg would favor discontinuation of present dosage of Eliquis and aspirin will place on DVT prophylaxis otherwise  · Chest x-ray showed in single view possible right lower lobe opacity however CTA of chest showed no infiltrate or PE      VTE Pharmacologic Prophylaxis:   Pharmacologic: Enoxaparin (Lovenox)  Mechanical VTE Prophylaxis in Place: Yes    Discussions with Specialists or Other Care Team Provider:  Yes with Orthopedics and Cardiology    Time Spent for Care: 30 minutes  More than 50% of total time spent on counseling and coordination of care as described above  Subjective:   Seems clear mentally today and now has impulsive admits to need for further physical therapy and possible rehab  Remain short of breath on exertion foot has had diminished peripheral edema overnight weight remains about the same as admission      Objective:     Vitals:   Temp (24hrs), Av 6 °F (36 4 °C), Min:96 2 °F (35 7 °C), Max:98 4 °F (36 9 °C)    HR:  [56-82] 69  Resp:  [18-21] 20  BP: (115-136)/(56-89) 133/65  SpO2: [94 %-98 %] 94 %  Body mass index is 35 54 kg/m²  Input and Output Summary (last 24 hours): Intake/Output Summary (Last 24 hours) at 10/24/17 0857  Last data filed at 10/23/17 2325   Gross per 24 hour   Intake              240 ml   Output              200 ml   Net               40 ml       Physical Exam:     Physical Exam:   General appearance: alert, appears stated age and cooperative  Head: Normocephalic, without obvious abnormality, atraumatic  Lungs: rales bibasilar  Heart: irregularly irregular rhythm  Abdomen: soft, non-tender; bowel sounds normal; no masses,  no organomegaly  Back: negative  Extremities: extremities normal, atraumatic, no cyanosis or edema, venous stasis dermatitis noted and White spread ecchymosis to posterior aspect of left leg and over patella  Neurologic: Grossly normal      Additional Data:     Labs:      Results from last 7 days  Lab Units 10/23/17  0627 10/22/17  2212   WBC Thousand/uL 6 43 6 74   HEMOGLOBIN g/dL 12 1 12 7   HEMATOCRIT % 37 3 38 7   PLATELETS Thousands/uL 221 231   NEUTROS PCT %  --  60   LYMPHS PCT %  --  24   MONOS PCT %  --  10   EOS PCT %  --  5       Results from last 7 days  Lab Units 10/24/17  0613 10/22/17  2212   SODIUM mmol/L 143 144   POTASSIUM mmol/L 3 9 3 8   CHLORIDE mmol/L 105 103   CO2 mmol/L 33* 34*   BUN mg/dL 17 16   CREATININE mg/dL 0 71 0 85   CALCIUM mg/dL 8 8 9 2   TOTAL PROTEIN g/dL  --  7 0   BILIRUBIN TOTAL mg/dL  --  0 59   ALK PHOS U/L  --  77   ALT U/L  --  25   AST U/L  --  20   GLUCOSE RANDOM mg/dL 106 103       Results from last 7 days  Lab Units 10/22/17  2212   INR  1 08       * I Have Reviewed All Lab Data Listed Above  * Additional Pertinent Lab Tests Reviewed: [facility] Admission Reviewed    Imaging:  X-ray Chest 2 Views    Result Date: 10/23/2017  Narrative: CHEST INDICATION:  Altered mental status  Shortness of breath  History of CHF   COMPARISON:  December 11, 2012 VIEWS:  Frontal and lateral projections IMAGES:  2 FINDINGS:  Permanent pacemaker appears stable from prior  Heart shadow appears unremarkable  Atherosclerotic vascular calcifications are noted  On the frontal view the patient is somewhat rotated, but there is a focal opacity in the right mid to lower lung field near the heart border which appears new from the previous examination  It is not visible as a discrete finding on the lateral projection  It has smooth borders with roughly triangular shape  This probably should be reassessed on follow-up with repeat dual-energy chest radiograph suggested, preferably with improved positioning  The left lung is clear  There is no pleural fluid or pneumothorax  Visualized osseous structures appear within normal limits for the patient's age  Impression: New focal opacity in the right mid to lower lung field only visible on the frontal view  Etiology indeterminate  Possibly atelectasis or focal infiltrate  Mass not entirely excluded  ##[email or web address]##sigslh Workstation performed: [id number]     Xr Knee 1 Or 2 Vw Left    Result Date: 10/23/2017  Narrative: LEFT KNEE INDICATION:  Left knee injury, fall last night  COMPARISON: 1/9/2013  VIEWS:  AP and lateral IMAGES:  3 FINDINGS: Total knee arthroplasty again identified, with the functioning components in satisfactory alignment  There is no evidence of acute fracture or arthroplasty loosening  There is no joint effusion  No lytic or blastic lesions are seen  There is prepatellar soft tissue swelling in the anterior knee, which might represent bursitis  Impression: No acute osseous abnormality status post total knee arthroplasty  Soft tissue swelling in the anterior knee, raising the question of posttraumatic prepatellar bursitis  Workstation performed: [id number]     [facility] Chest Pe Study    Result Date: 10/23/2017  Narrative: CTA - CHEST WITH IV CONTRAST - PULMONARY ANGIOGRAM INDICATION: Shortness of breath  COMPARISON: None   TECHNIQUE: CTA examination of the chest was performed using angiographic technique according to a protocol specifically tailored to evaluate for pulmonary embolism  Reformatted images were created in axial, sagittal, and coronal planes  In addition, coronal 3D MIP postprocessing was performed on the acquisition scanner  Radiation dose length product (DLP) for this visit:  463 mGy-cm   This examination, like all CT scans performed in the [facility], was performed utilizing techniques to minimize radiation dose exposure, including the use of iterative reconstruction and automated exposure control  IV Contrast:  85 mL of iohexol (OMNIPAQUE)      FINDINGS: PULMONARY ARTERIAL TREE:  No pulmonary embolus is seen  LUNGS:  Lungs are clear  There is no tracheal or endobronchial lesion  PLEURA:  Unremarkable  HEART/AORTA:  Unremarkable for patient's age  MEDIASTINUM AND JESUS:  Unremarkable  CHEST WALL AND LOWER NECK:       Left chest wall pacemaker is present  VISUALIZED STRUCTURES IN THE UPPER ABDOMEN:  Unremarkable  OSSEOUS STRUCTURES:  No acute fracture or destructive osseous lesion  Impression: No evidence of pulmonary embolus  Workstation performed: [id number]     Imaging Reports Reviewed Today Include:  Chest x-ray reviewed and CT imaging  Imaging Personally Reviewed by Myself Includes:    Procedure: X-ray Chest 2 Views    Result Date: 10/23/2017  Narrative: CHEST INDICATION:  Altered mental status  Shortness of breath  History of CHF  COMPARISON:  December 11, 2012 VIEWS:  Frontal and lateral projections IMAGES:  2 FINDINGS:  Permanent pacemaker appears stable from prior  Heart shadow appears unremarkable  Atherosclerotic vascular calcifications are noted  On the frontal view the patient is somewhat rotated, but there is a focal opacity in the right mid to lower lung field near the heart border which appears new from the previous examination    It is not visible as a discrete finding on the lateral projection  It has smooth borders with roughly triangular shape  This probably should be reassessed on follow-up with repeat dual-energy chest radiograph suggested, preferably with improved positioning  The left lung is clear  There is no pleural fluid or pneumothorax  Visualized osseous structures appear within normal limits for the patient's age  Impression: New focal opacity in the right mid to lower lung field only visible on the frontal view  Etiology indeterminate  Possibly atelectasis or focal infiltrate  Mass not entirely excluded  ##sigslh##sigslh Workstation performed: [id number]     Procedure: Xr Knee 1 Or 2 Vw Left    Result Date: 10/23/2017  Narrative: LEFT KNEE INDICATION:  Left knee injury, fall last night  COMPARISON: 1/9/2013  VIEWS:  AP and lateral IMAGES:  3 FINDINGS: Total knee arthroplasty again identified, with the functioning components in satisfactory alignment  There is no evidence of acute fracture or arthroplasty loosening  There is no joint effusion  No lytic or blastic lesions are seen  There is prepatellar soft tissue swelling in the anterior knee, which might represent bursitis  Impression: No acute osseous abnormality status post total knee arthroplasty  Soft tissue swelling in the anterior knee, raising the question of posttraumatic prepatellar bursitis  Workstation performed: [id number]     Procedure: Cta Ed Chest Pe Study    Result Date: 10/23/2017  Narrative: CTA - CHEST WITH IV CONTRAST - PULMONARY ANGIOGRAM INDICATION: Shortness of breath  COMPARISON: None  TECHNIQUE: CTA examination of the chest was performed using angiographic technique according to a protocol specifically tailored to evaluate for pulmonary embolism  Reformatted images were created in axial, sagittal, and coronal planes  In addition, coronal 3D MIP postprocessing was performed on the acquisition scanner  Radiation dose length product (DLP) for this visit:  463 mGy-cm     This examination, like all CT scans performed in the [facility], was performed utilizing techniques to minimize radiation dose exposure, including the use of iterative reconstruction and automated exposure control  IV Contrast:  85 mL of iohexol (OMNIPAQUE)      FINDINGS: PULMONARY ARTERIAL TREE:  No pulmonary embolus is seen  LUNGS:  Lungs are clear  There is no tracheal or endobronchial lesion  PLEURA:  Unremarkable  HEART/AORTA:  Unremarkable for patient's age  MEDIASTINUM AND JESUS:  Unremarkable  CHEST WALL AND LOWER NECK:       Left chest wall pacemaker is present  VISUALIZED STRUCTURES IN THE UPPER ABDOMEN:  Unremarkable  OSSEOUS STRUCTURES:  No acute fracture or destructive osseous lesion  Impression: No evidence of pulmonary embolus  Workstation performed: [id number]        Recent Cultures (last 7 days):           Last 24 Hours Medication List:     atorvastatin 20 mg Oral Daily   calcium carbonate 625 mg Oral BID With Meals   cholecalciferol 5,000 Units Oral Daily   furosemide 80 mg Intravenous BID (diuretic)   Mirabegron ER 50 mg Oral Daily   polyethylene glycol 17 g Oral Daily   potassium chloride 20 mEq Oral BID   senna-docusate sodium 1 tablet Oral Daily        Today, Patient Was Seen By: Jarett Ortega MD    ** Please Note: Dragon 360 Dictation voice to text software may have been used in the creation of this document   ** Topical Metronidazole Counseling: Metronidazole is a topical antibiotic medication. You may experience burning, stinging, redness, or allergic reactions.  Please call our office if you develop any problems from using this medication.

## 2023-07-21 NOTE — RESPIRATORY THERAPY NOTE
RT Protocol Note  Nicole Klein 80 y o  female MRN: [id number]  Unit/Bed#: -01 Encounter: [id number]    Assessment    Principal Problem:    Acute on chronic diastolic congestive heart failure ([facility] )  Active Problems:    Hyperlipidemia    A-fib ([facility])    Pacemaker      Home Pulmonary Medications:  none       Past Medical History:   Diagnosis Date    Amnesia     Cardiac disease     CHF (congestive heart failure) ([facility])     Coronary artery disease     Dementia ([facility])     Dementia ([facility] )     Disease of thyroid gland     Edema     Hearing loss     Hyperlipidemia     Nocturia     Osteoarthritis     Osteoporosis     Otosclerosis     Pacemaker     Renal disorder     Vitamin D deficiency      Social History     Socioeconomic History    Marital status:      Spouse name: None    Number of children: None    Years of education: None    Highest education level: None   Occupational History    None   Social Needs    Financial resource strain: None    Food insecurity:     Worry: None     Inability: None    Transportation needs:     Medical: None     Non-medical: None   Tobacco Use    Smoking status: Never Smoker    Smokeless tobacco: Never Used   Substance and Sexual Activity    Alcohol use:  Yes     Alcohol/week: 7 0 standard drinks     Types: 7 Glasses of wine per week    Drug use: No    Sexual activity: None   Lifestyle    Physical activity:     Days per week: None     Minutes per session: None    Stress: None   Relationships    Social connections:     Talks on phone: None     Gets together: None     Attends Shinto service: None     Active member of club or organization: None     Attends meetings of clubs or organizations: None     Relationship status: None    Intimate partner violence:     Fear of current or ex partner: None     Emotionally abused: None     Physically abused: None     Forced sexual activity: None   Other Topics Concern    None   Social History Narrative    None Subjective         Objective    Physical Exam:   Assessment Type: (P) Pre-treatment  General Appearance: (P) Awake, Alert  Respiratory Pattern: (P) Normal  Chest Assessment: (P) Chest expansion symmetrical  Bilateral Breath Sounds: (P) Diminished  Cough: (P) None  O2 Device: (P) NC    Vitals:  Blood pressure 141/62, pulse 62, temperature 98 °F (36 7 °C), temperature source Tympanic, resp  rate 20, weight 99 kg (218 lb 4 1 oz), SpO2 97 %, not currently breastfeeding            Imaging and other studies: I have personally reviewed pertinent films in PACS    O2 Device: (P) NC     Plan    Respiratory Plan: (P) Mild Distress pathway        Resp Comments: (P) physician ordered neb tx's Q6 English